# Patient Record
Sex: FEMALE | Race: WHITE | NOT HISPANIC OR LATINO | Employment: OTHER | URBAN - METROPOLITAN AREA
[De-identification: names, ages, dates, MRNs, and addresses within clinical notes are randomized per-mention and may not be internally consistent; named-entity substitution may affect disease eponyms.]

---

## 2017-02-09 ENCOUNTER — OFFICE VISIT (OUTPATIENT)
Dept: FAMILY MEDICINE | Facility: CLINIC | Age: 67
End: 2017-02-09
Payer: COMMERCIAL

## 2017-02-09 VITALS
HEIGHT: 63 IN | DIASTOLIC BLOOD PRESSURE: 80 MMHG | WEIGHT: 151 LBS | TEMPERATURE: 98.5 F | BODY MASS INDEX: 26.75 KG/M2 | HEART RATE: 80 BPM | RESPIRATION RATE: 16 BRPM | SYSTOLIC BLOOD PRESSURE: 142 MMHG

## 2017-02-09 DIAGNOSIS — R09.82 PND (POST-NASAL DRIP): ICD-10-CM

## 2017-02-09 DIAGNOSIS — J06.9 UPPER RESPIRATORY TRACT INFECTION, UNSPECIFIED TYPE: Primary | ICD-10-CM

## 2017-02-09 DIAGNOSIS — K21.9 GASTROESOPHAGEAL REFLUX DISEASE WITHOUT ESOPHAGITIS: ICD-10-CM

## 2017-02-09 PROBLEM — I10 BENIGN ESSENTIAL HYPERTENSION: Status: ACTIVE | Noted: 2017-02-09

## 2017-02-09 PROBLEM — I47.10 SUPRAVENTRICULAR TACHYCARDIA (H): Status: ACTIVE | Noted: 2017-02-09

## 2017-02-09 PROCEDURE — 99204 OFFICE O/P NEW MOD 45 MIN: CPT | Performed by: FAMILY MEDICINE

## 2017-02-09 RX ORDER — IRBESARTAN 150 MG/1
150 TABLET ORAL DAILY
Qty: 90 TABLET | Refills: 3 | COMMUNITY
Start: 2017-02-09 | End: 2017-12-07

## 2017-02-09 RX ORDER — FLUTICASONE PROPIONATE 50 MCG
2 SPRAY, SUSPENSION (ML) NASAL DAILY
Qty: 1 BOTTLE | Refills: 0 | Status: SHIPPED | OUTPATIENT
Start: 2017-02-09 | End: 2017-12-07

## 2017-02-09 RX ORDER — DILTIAZEM HYDROCHLORIDE 240 MG/1
240 CAPSULE, COATED, EXTENDED RELEASE ORAL DAILY
Qty: 90 CAPSULE | Refills: 1 | COMMUNITY
Start: 2017-02-09 | End: 2017-12-07

## 2017-02-09 NOTE — MR AVS SNAPSHOT
After Visit Summary   2/9/2017    Shavonne Lundberg    MRN: [id number]           Patient Information     Date Of Birth          1950        Visit Information        Provider Department      2/9/2017 11:30 AM Shama Temple MD [facility]        Today's Diagnoses     Upper respiratory tract infection, unspecified type    -  1     PND (post-nasal drip)           Care Instructions      Recommend Mucinex DM 1 tablet twice a day - take with a full glass of water  Flonase daily  Follow up as needed    Viral Upper Respiratory Illness (Adult)  You have a viral upper respiratory illness (URI), which is another term for the common cold. This illness is contagious during the first few days. It is spread through the air by coughing and sneezing. It may also be spread by direct contact (touching the sick person and then touching your own eyes, nose, or mouth). Frequent handwashing will decrease risk of spread. Most viral illnesses go away within 7 to 10 days with rest and simple home remedies. Sometimes the illness may last for several weeks. Antibiotics will not kill a virus, and they are generally not prescribed for this condition.    Home care    If symptoms are severe, rest at home for the first 2 to 3 days. When you resume activity, don't let yourself get too tired.    Avoid being exposed to cigarette smoke (yours or others ).    You may use acetaminophen or ibuprofen to control pain and fever, unless another medicine was prescribed. (Note: If you have chronic liver or kidney disease, have ever had a stomach ulcer or gastrointestinal bleeding, or are taking blood-thinning medicines, talk with your healthcare provider before using these medicines.) Aspirin should never be given to anyone under 18 years of age who is ill with a viral infection or fever. It may cause severe liver or brain damage.    Your appetite may be poor, so a light diet is fine. Avoid dehydration by  drinking 6 to 8 glasses of fluids per day (water, soft drinks, juices, tea, or soup). Extra fluids will help loosen secretions in the nose and lungs.    Over-the-counter cold medicines will not shorten the length of time you re sick, but they may be helpful for the following symptoms: cough, sore throat, and nasal and sinus congestion. (Note: Do not use decongestants if you have high blood pressure.)  Follow-up care  Follow up with your healthcare provider, or as advised.  When to seek medical advice  Call your healthcare provider right away if any of these occur:    Cough with lots of colored sputum (mucus)    Severe headache; face, neck, or ear pain    Difficulty swallowing due to throat pain    Fever of 100.4 F (38 C)  Call 911, or get immediate medical care  Call emergency services right away if any of these occur:    Chest pain, shortness of breath, wheezing, or difficulty breathing    Coughing up blood    Inability to swallow due to throat pain    4361-6439 [employer]. [address] [place] [address]. All rights reserved. This information is not intended as a substitute for professional medical care. Always follow your healthcare professional's instructions.              Follow-ups after your visit        Who to contact     If you have questions or need follow up information about today's clinic visit or your schedule please contact [facility] directly at [phone].  Normal or non-critical lab and imaging results will be communicated to you by MyChart, letter or phone within 4 business days after the clinic has received the results. If you do not hear from us within 7 days, please contact the clinic through MyChart or phone. If you have a critical or abnormal lab result, we will notify you by phone as soon as possible.  Submit refill requests through [employer] or call your pharmacy and they will forward the refill request to us. Please allow 3 business days for your  "refill to be completed.          Additional Information About Your Visit        [employer] Mobile Information     [employer] lets you send messages to your doctor, view your test results, renew your prescriptions, schedule appointments and more. To sign up, go to [email or web address] . Click on \"Log in\" on the left side of the screen, which will take you to the Welcome page. Then click on \"Sign up Now\" on the right side of the page.     You will be asked to enter the access code listed below, as well as some personal information. Please follow the directions to create your username and password.     Your access code is: [email or web address]  Expires: 5/10/2017 12:03 PM     Your access code will  in 90 days. If you need help or a new code, please call your [place] clinic or [phone].        Care EveryWhere ID     This is your Care EveryWhere ID. This could be used by other organizations to access your [place] medical records  [id number]        Your Vitals Were     Pulse Temperature Respirations Height BMI (Body Mass Index) Breastfeeding?    80 98.5  F (36.9  C) (Oral) 16 5' 3\" (1.6 m) 26.76 kg/m2 No       Blood Pressure from Last 3 Encounters:   17 142/80    Weight from Last 3 Encounters:   17 151 lb (68.493 kg)              Today, you had the following     No orders found for display         Today's Medication Changes          These changes are accurate as of: 17 12:03 PM.  If you have any questions, ask your nurse or doctor.               Start taking these medicines.        Dose/Directions    fluticasone 50 MCG/ACT spray   Commonly known as:  FLONASE   Used for:  PND (post-nasal drip)   Started by:  Shama Temple MD        Dose:  2 spray   Spray 2 sprays into both nostrils daily   Quantity:  1 Bottle   Refills:  0            Where to get your medicines      [facility] [address] [place] [address] [address] AT [place] [address]  " [address] [facility] [address]    Hours:  24-hours Phone:  [phone]    - fluticasone 50 MCG/ACT spray             Primary Care Provider Office Phone # Fax #    Shama Temple [phone]       [facility] [address] [facility] [address]        Thank you!     Thank you for choosing [facility]  for your care. Our goal is always to provide you with excellent care. Hearing back from our patients is one way we can continue to improve our services. Please take a few minutes to complete the written survey that you may receive in the mail after your visit with us. Thank you!             Your Updated Medication List - Protect others around you: Learn how to safely use, store and throw away your medicines at www.disposemymeds.org.          This list is accurate as of: 2/9/17 12:03 PM.  Always use your most recent med list.                   Brand Name Dispense Instructions for use    CARDIZEM  MG 24 hr capsule   Generic drug:  diltiazem     90 capsule    Take 1 capsule (240 mg) by mouth daily       fluticasone 50 MCG/ACT spray    FLONASE    1 Bottle    Spray 2 sprays into both nostrils daily       irbesartan 150 MG tablet    AVAPRO    90 tablet    Take 1 tablet (150 mg) by mouth daily

## 2017-02-09 NOTE — PATIENT INSTRUCTIONS
Recommend Mucinex DM 1 tablet twice a day - take with a full glass of water  Flonase daily  Follow up as needed    Viral Upper Respiratory Illness (Adult)  You have a viral upper respiratory illness (URI), which is another term for the common cold. This illness is contagious during the first few days. It is spread through the air by coughing and sneezing. It may also be spread by direct contact (touching the sick person and then touching your own eyes, nose, or mouth). Frequent handwashing will decrease risk of spread. Most viral illnesses go away within 7 to 10 days with rest and simple home remedies. Sometimes the illness may last for several weeks. Antibiotics will not kill a virus, and they are generally not prescribed for this condition.    Home care    If symptoms are severe, rest at home for the first 2 to 3 days. When you resume activity, don't let yourself get too tired.    Avoid being exposed to cigarette smoke (yours or others ).    You may use acetaminophen or ibuprofen to control pain and fever, unless another medicine was prescribed. (Note: If you have chronic liver or kidney disease, have ever had a stomach ulcer or gastrointestinal bleeding, or are taking blood-thinning medicines, talk with your healthcare provider before using these medicines.) Aspirin should never be given to anyone under 18 years of age who is ill with a viral infection or fever. It may cause severe liver or brain damage.    Your appetite may be poor, so a light diet is fine. Avoid dehydration by drinking 6 to 8 glasses of fluids per day (water, soft drinks, juices, tea, or soup). Extra fluids will help loosen secretions in the nose and lungs.    Over-the-counter cold medicines will not shorten the length of time you re sick, but they may be helpful for the following symptoms: cough, sore throat, and nasal and sinus congestion. (Note: Do not use decongestants if you have high blood pressure.)  Follow-up care  Follow up with your  healthcare provider, or as advised.  When to seek medical advice  Call your healthcare provider right away if any of these occur:    Cough with lots of colored sputum (mucus)    Severe headache; face, neck, or ear pain    Difficulty swallowing due to throat pain    Fever of 100.4 F (38 C)  Call 911, or get immediate medical care  Call emergency services right away if any of these occur:    Chest pain, shortness of breath, wheezing, or difficulty breathing    Coughing up blood    Inability to swallow due to throat pain    7311-5483 [employer]. [address] [place] [address]. All rights reserved. This information is not intended as a substitute for professional medical care. Always follow your healthcare professional's instructions.

## 2017-02-09 NOTE — NURSING NOTE
"Chief Complaint   Patient presents with     URI     uri symptoms x4 days, c/o cough and chest congestion, fatigue     Gastrointestinal Problem     having some nausea and @3 weeks ago was having vomiting and loose stools     Initial /80 mmHg  Pulse 80  Temp(Src) 98.5  F (36.9  C) (Oral)  Resp 16  Ht 5' 3\" (1.6 m)  Wt 151 lb (68.493 kg)  BMI 26.76 kg/m2  Breastfeeding? No Estimated body mass index is 26.76 kg/(m^2) as calculated from the following:    Height as of this encounter: 5' 3\" (1.6 m).    Weight as of this encounter: 151 lb (68.493 kg)..  BP completed using cuff size large.            My Patiño/MARYAM    "

## 2017-02-09 NOTE — PROGRESS NOTES
"  SUBJECTIVE:                                                    Shavonne Lundberg is a 66 year old female who presents to clinic today for the following health issues:      Acute Illness   Acute illness concerns: cough and chest congestion, fatigue  Onset: x4 days     Fever: unsure    Chills/Sweats: YES    Headache (location?): no     Sinus Pressure:YES    Conjunctivitis:  no    Ear Pain: no    Rhinorrhea: YES    Congestion: YES    Sore Throat: no     PND: YES     Cough: YES    Wheeze: no     Decreased Appetite: YES    Nausea: YES    Vomiting: YES- 3 wks ago     Diarrhea: had diarrhea about 3 weeks ago     Dysuria/Freq.: no     Fatigue/Achiness: YES    Sick/Strep Exposure: YES      Therapies Tried and outcome: Tylenol       Patient also reports belching, mild epigastric pain, and feelings of bloating for months.        Problem list and histories reviewed & adjusted, as indicated.  Additional history: as documented    Problem list, Medication list, Allergies, and Medical/Social/Surgical histories reviewed in EPIC and updated as appropriate.    ROS:  Constitutional, HEENT, cardiovascular, pulmonary, GI, musculoskeletal, neuro, skin, endocrine and psych systems are negative, except as otherwise noted.    OBJECTIVE:                                                    /80  Pulse 80  Temp 98.5  F (36.9  C) (Oral)  Resp 16  Ht 5' 3\" (1.6 m)  Wt 151 lb (68.5 kg)  Breastfeeding? No  BMI 26.75 kg/m2  Body mass index is 26.75 kg/(m^2).  GENERAL: healthy, alert and no distress  EYES: Eyes grossly normal to inspection, PERRL and conjunctivae and sclerae normal  HENT: normal cephalic/atraumatic, ear canals and TM's normal, nose and mouth without ulcers or lesions, oropharynx clear, oral mucous membranes moist and sinuses: not tender, congested   NECK: no adenopathy, no asymmetry, masses, or scars and thyroid normal to palpation  RESP: lungs clear to auscultation - no rales, rhonchi or wheezes  CV: regular rate and " rhythm, normal S1 S2, no S3 or S4, no murmur, click or rub, no peripheral edema and peripheral pulses strong  ABDOMEN: mild tenderness epigastric and bowel sounds normal  MS: no gross musculoskeletal defects noted, no edema  NEURO: Normal strength and tone, mentation intact and speech normal  PSYCH: mentation appears normal, affect normal/bright    Diagnostic Test Results:  none      ASSESSMENT/PLAN:                                                        1. Upper respiratory tract infection, unspecified type  - supportive care discussed. No indication for Abx at this time. Recommend mucinex OTC, honey and plenty of fluids. If symptoms persist or worsen in the next 5-7 days patient to follow up with provider.     2. PND (post-nasal drip)  - by history. Will start on flonase   - fluticasone (FLONASE) 50 MCG/ACT spray; Spray 2 sprays into both nostrils daily  Dispense: 1 Bottle; Refill: 0    3. Gastroesophageal reflux disease without esophagitis  - will start on trial of PPI to see if symptoms improve   - omeprazole (PRILOSEC) 20 MG CR capsule; Take 1 capsule (20 mg) by mouth daily  Dispense: 30 capsule; Refill: 1    See Patient Instructions    Shama Temple MD  [facility]

## 2017-04-21 ENCOUNTER — TELEPHONE (OUTPATIENT)
Dept: FAMILY MEDICINE | Facility: CLINIC | Age: 67
End: 2017-04-21

## 2017-04-21 NOTE — LETTER
[facility]  [id number] [place] [id number]  [phone]  April 21, 2017    Shavonne Lundberg  [address] [place] [address]    Dear Shavonne,    I care about your health and have reviewed your health plan. I have reviewed your medical conditions, medication list, and lab results and am making recommendations based on this review, to better manage your health.    You are in particular need of attention regarding:  -Breast Cancer Screening    I am recommending that you:  {recommendations:-schedule a MAMMOGRAM which is due. We have mammogram available at our clinic; Tuesday 8:00am-11:30am or Wednesday 2:00pm-4:15pm- to schedule call [phone].   Please disregard this reminder if you have had this exam elsewhere within the last year.  It would be helpful for us to have a copy of your mammogram report in our file so that we can best coordinate your care.    Here is a list of Health Maintenance topics that are due now or due soon:  Health Maintenance Due   Topic Date Due     ADVANCE DIRECTIVE PLANNING Q5 YRS (NO INBASKET)  06/28/1968     HEPATITIS C SCREENING  06/28/1968     MAMMO SCREEN Q2 YR (SYSTEM ASSIGNED)  06/28/1990     LIPID SCREEN Q5 YR FEMALE (SYSTEM ASSIGNED)  06/28/1995     COLON CANCER SCREEN (SYSTEM ASSIGNED)  06/28/2000     FALL RISK ASSESSMENT  06/28/2015     DEXA SCAN SCREENING (SYSTEM ASSIGNED)  06/28/2015       Please call us at [phone] (or use [employer]) to address the above recommendations.     Thank you for trusting [facility] and we appreciate the opportunity to serve you.  We look forward to supporting your healthcare needs in the future.    Healthy Regards,    Shama Temple MD/brigitte

## 2017-04-21 NOTE — TELEPHONE ENCOUNTER
Panel Management Review      Patient has the following on her problem list:     Hypertension   Last three blood pressure readings:  BP Readings from Last 3 Encounters:   02/09/17 142/80     Blood pressure: Passed    HTN Guidelines:  Age 18-59 BP range:  Less than 140/90  Age 60-85 with Diabetes:  Less than 140/90  Age 60-85 without Diabetes:  less than 150/90      Composite cancer screening  Chart review shows that this patient is due/due soon for the following Mammogram  Summary:    Patient is due/failing the following:   MAMMOGRAM    Action needed:   Patient needs referral/order: mammo    Type of outreach:    Phone, left message for patient to call back.  and Sent letter.    Questions for provider review:    None                                                                                                                                    yM Patiño/MARYAM  [place]---[facility]       Chart routed to Care Team .

## 2017-08-04 ENCOUNTER — TELEPHONE (OUTPATIENT)
Dept: FAMILY MEDICINE | Facility: CLINIC | Age: 67
End: 2017-08-04

## 2017-11-29 ENCOUNTER — TRANSFERRED RECORDS (OUTPATIENT)
Dept: HEALTH INFORMATION MANAGEMENT | Facility: CLINIC | Age: 67
End: 2017-11-29

## 2017-12-07 ENCOUNTER — OFFICE VISIT (OUTPATIENT)
Dept: FAMILY MEDICINE | Facility: CLINIC | Age: 67
End: 2017-12-07
Payer: COMMERCIAL

## 2017-12-07 VITALS
HEART RATE: 90 BPM | HEIGHT: 62 IN | SYSTOLIC BLOOD PRESSURE: 124 MMHG | RESPIRATION RATE: 16 BRPM | OXYGEN SATURATION: 98 % | DIASTOLIC BLOOD PRESSURE: 62 MMHG | BODY MASS INDEX: 28.34 KG/M2 | TEMPERATURE: 98 F | WEIGHT: 154 LBS

## 2017-12-07 DIAGNOSIS — E78.5 HYPERLIPIDEMIA WITH TARGET LDL LESS THAN 130: ICD-10-CM

## 2017-12-07 DIAGNOSIS — R73.9 BLOOD GLUCOSE ELEVATED: ICD-10-CM

## 2017-12-07 DIAGNOSIS — I10 BENIGN ESSENTIAL HYPERTENSION: ICD-10-CM

## 2017-12-07 DIAGNOSIS — Z12.31 VISIT FOR SCREENING MAMMOGRAM: ICD-10-CM

## 2017-12-07 DIAGNOSIS — R53.83 FATIGUE, UNSPECIFIED TYPE: ICD-10-CM

## 2017-12-07 DIAGNOSIS — I47.10 SUPRAVENTRICULAR TACHYCARDIA (H): ICD-10-CM

## 2017-12-07 DIAGNOSIS — G47.9 SLEEP DISTURBANCE: ICD-10-CM

## 2017-12-07 DIAGNOSIS — Z83.3 FAMILY HISTORY OF DIABETES MELLITUS: ICD-10-CM

## 2017-12-07 DIAGNOSIS — Z00.00 ROUTINE HISTORY AND PHYSICAL EXAMINATION OF ADULT: Primary | ICD-10-CM

## 2017-12-07 PROCEDURE — 99213 OFFICE O/P EST LOW 20 MIN: CPT | Mod: 25 | Performed by: INTERNAL MEDICINE

## 2017-12-07 PROCEDURE — 99397 PER PM REEVAL EST PAT 65+ YR: CPT | Performed by: INTERNAL MEDICINE

## 2017-12-07 RX ORDER — DILTIAZEM HYDROCHLORIDE 240 MG/1
240 CAPSULE, COATED, EXTENDED RELEASE ORAL DAILY
Qty: 90 CAPSULE | Refills: 1 | Status: SHIPPED | OUTPATIENT
Start: 2017-12-07 | End: 2017-12-18

## 2017-12-07 RX ORDER — DILTIAZEM HYDROCHLORIDE 240 MG/1
240 CAPSULE, COATED, EXTENDED RELEASE ORAL DAILY
Qty: 90 CAPSULE | Refills: 1 | Status: CANCELLED | OUTPATIENT
Start: 2017-12-07

## 2017-12-07 RX ORDER — IRBESARTAN 150 MG/1
150 TABLET ORAL DAILY
Qty: 90 TABLET | Refills: 3 | Status: SHIPPED | OUTPATIENT
Start: 2017-12-07 | End: 2017-12-18

## 2017-12-07 RX ORDER — CRANBERRY FRUIT EXTRACT 200 MG
600 CAPSULE ORAL DAILY
COMMUNITY
Start: 2010-12-10

## 2017-12-07 RX ORDER — IRBESARTAN 150 MG/1
150 TABLET ORAL DAILY
Qty: 90 TABLET | Refills: 3 | Status: CANCELLED | OUTPATIENT
Start: 2017-12-07

## 2017-12-07 RX ORDER — IBUPROFEN 200 MG
1-2 TABLET ORAL EVERY 6 HOURS PRN
COMMUNITY
Start: 2006-04-14 | End: 2021-11-09

## 2017-12-07 NOTE — NURSING NOTE
"Chief Complaint   Patient presents with     Physical     Establish Care     Hypertension     has been having some low blood pressures noted at night.   felt like her heart was skipping beats   90/47  Pulse 50       Initial /62  Pulse 90  Temp 98  F (36.7  C) (Oral)  Resp 16  Ht 5' 2.2\" (1.58 m)  Wt 154 lb (69.9 kg)  SpO2 98%  BMI 27.99 kg/m2 Estimated body mass index is 27.99 kg/(m^2) as calculated from the following:    Height as of this encounter: 5' 2.2\" (1.58 m).    Weight as of this encounter: 154 lb (69.9 kg).  Medication Reconciliation: complete    "

## 2017-12-07 NOTE — PROGRESS NOTES
Pt has been seen once at [facility]; desires transfer of care to [facility].    SUBJECTIVE:   Shavonne Lundberg is a 67 year old female who presents for Preventive Visit.  Are you in the first 12 months of your Medicare Part B coverage? No    Healthy Habits:  Answers for HPI/ROS submitted by the patient on 12/7/2017   Annual Exam:  Getting at least 3 servings of Calcium per day:: NO  Bi-annual eye exam:: Yes  Dental care twice a year:: Yes  Sleep apnea or symptoms of sleep apnea:: Daytime drowsiness  Frequency of exercise:: 2-3 days/week  Taking medications regularly:: Yes  Medication side effects:: None  Additional concerns today:: YES  PHQ-2 Score: 0  Duration of exercise:: Less than 15 minutes    COGNITIVE SCREEN  1) Repeat 3 items (Banana, Sunrise, Chair)    2) Clock draw: NORMAL  3) 3 item recall: Recalls 3 objects  Results: 3 items recalled: COGNITIVE IMPAIRMENT LESS LIKELY  Mini-CogTM Copyright YURIDIA Abrams. Licensed by the author for use in [facility] [employer]; reprinted with permission (heidi@[facility]). All rights reserved.          Hypertension Follow-up    Outpatient blood pressures are being checked at home. Results are 120-150/65-80.    Low Salt Diet: no added salt    Patient is currently taking diltiazem (Cardizem CD) 240 mg daily and irbesartan (Avapro) 150 mg daily  BP Readings from Last 3 Encounters:   12/07/17 124/62   02/09/17 142/80     Reviewed and updated as needed this visit by clinical staff  Tobacco  Allergies  Meds  Problems  Med Hx  Surg Hx  Fam Hx  Soc Hx        Reviewed and updated as needed this visit by Provider  Allergies  Meds  Problems        Social History   Substance Use Topics     Smoking status: Former Smoker     Smokeless tobacco: Never Used      Comment: quit in 1985     Alcohol use No     The patient does not drink >3 drinks per day nor >7 drinks per week.     Today's PHQ-2 Score:   PHQ-2 ( 1999 Pf[employer]) 12/7/2017   Q1: Little interest or  pleasure in doing things 0   Q2: Feeling down, depressed or hopeless 0   PHQ-2 Score 0   Q1: Little interest or pleasure in doing things Not at all   Q2: Feeling down, depressed or hopeless Not at all   PHQ-2 Score 0   Do you feel safe in your environment - Yes    Do you have a Health Care Directive?: No: Advance care planning was reviewed with patient; patient declined at this time.    Current providers sharing in care for this patient include: Patient Care Team:  Juanis Davison MD as PCP - General (Internal Medicine)      Hearing impairment: No    Ability to successfully perform activities of daily living: Yes, no assistance needed   Fall risk: Fallen 2 or more times in the past year?: No  Any fall with injury in the past year?: No    Home safety: none identified    The following health maintenance items are reviewed in Epic and correct as of today:  Health Maintenance   Topic Date Due     HEPATITIS C SCREENING  06/28/1968     LIPID SCREEN Q5 YR FEMALE (SYSTEM ASSIGNED)  06/28/1995     FALL RISK ASSESSMENT  06/28/2015     DEXA SCAN SCREENING (SYSTEM ASSIGNED)  06/28/2015     MAMMO SCREEN Q2 YR (SYSTEM ASSIGNED)  12/01/2018     TETANUS IMMUNIZATION (SYSTEM ASSIGNED)  09/06/2022     ADVANCE DIRECTIVE PLANNING Q5 YRS  12/07/2022     COLON CANCER SCREEN (SYSTEM ASSIGNED)  12/01/2025     INFLUENZA VACCINE (SYSTEM ASSIGNED)  Addressed     PNEUMOCOCCAL  Completed     Mammogram Screening: Patient over age 50, mutual decision to screen reflected in health maintenance.    History of abnormal Pap smear: NO - over age 65 - see link Cervical Cytology Screening Guidelines    Labs reviewed in EPIC  BP Readings from Last 3 Encounters:   12/07/17 124/62   02/09/17 142/80    Wt Readings from Last 3 Encounters:   12/07/17 154 lb (69.9 kg)   02/09/17 151 lb (68.5 kg)        No lab results found.     ROS:  CONSTITUTIONAL: NEGATIVE for fever, chills, change in weight  INTEGUMENTARY/SKIN: POSITIVE for spot behind knee; POSITIVE  "for lump ; NEGATIVE for worrisome rashes, moles or lesions  ENT/MOUTH: NEGATIVE for ear, mouth and throat problems  RESP: NEGATIVE for significant cough or SOB  BREAST: NEGATIVE for masses, tenderness or discharge  CV: HTN - taking diltiazem (Cardizem CD) and irbesartan (Avapro); bilateral varicose veins - fitted with socks recently; NEGATIVE for chest pain, palpitations or peripheral edema  GI: Heartburn - no trouble with regular use of probiotics and apple cider vinegar; NEGATIVE for nausea, abdominal pain, or change in bowel habits  MUSCULOSKELETAL: NEGATIVE for significant arthralgias or myalgia  NEURO: NEGATIVE for weakness, dizziness or paresthesias  ENDOCRINE: NEGATIVE for temperature intolerance, skin/hair changes  HEME/ALLERGY/IMMUNE: NEGATIVE for bleeding problems  PSYCHIATRIC: NEGATIVE for changes in mood or affect    This document serves as a record of the services and decisions personally performed and made by Juanis Davison MD. It was created on her behalf by Sade Lamar, a trained medical scribe. The creation of this document is based on the provider's statements to the medical scribe.   Sade Lamar, 4:04 PM, December 7, 2017    OBJECTIVE:   /62  Pulse 90  Temp 98  F (36.7  C) (Oral)  Resp 16  Ht 5' 2.2\" (1.58 m)  Wt 154 lb (69.9 kg)  SpO2 98%  BMI 27.99 kg/m2 Estimated body mass index is 27.99 kg/(m^2) as calculated from the following:    Height as of this encounter: 5' 2.2\" (1.58 m).    Weight as of this encounter: 154 lb (69.9 kg).  EXAM:   GENERAL APPEARANCE: healthy, alert and no distress  HENT: ear canals and TM's normal, nose and mouth without ulcers or lesions, oropharynx clear and oral mucous membranes moist  NECK: no adenopathy, thyroid normal to palpation and no bruits  RESP: lungs clear to auscultation - no rales, rhonchi or wheezes  BREAST: normal without masses, tenderness or nipple discharge and no palpable axillary masses or adenopathy  CV: regular rates and rhythm, normal " S1 S2, no peripheral edema and peripheral pulses strong  ABDOMEN: soft, nontender, no hepatosplenomegaly, no masses and bowel sounds normal  MS: no musculoskeletal defects are noted, gait is age appropriate without ataxia  SKIN: non-worrisome keratosis of  lower extremity; ; no suspicious lesions or rashes  NEURO: Normal strength and tone, mentation intact, speech normal, gait normal and Romberg normal  PSYCH: mentation appears normal and affect normal/bright    ASSESSMENT / PLAN:   (Z12.31) Visit for screening mammogram (primary encounter diagnosis)  Comment: clinical breast exam completed and normal; due for mammogram screening, scheduled with truck at clinic tomorrow  Plan: MA Screening Digital Bilateral          (I47.1) Supraventricular tachycardia (H)  Comment: medications well tolerated and effective; will continue to monitor; no change in medications/dosage at this time; due for labs  Plan: diltiazem (CARDIZEM CD) 240 MG 24 hr capsule,         Albumin Random Urine Quantitative with Creat         Ratio, diltiazem (CARDIZEM CD) 240 MG 24 hr         capsule          (I10) Benign essential hypertension  Comment: BP reviewed and well-controlled on diltiazem and irbesartan; medications well tolerated and effective; will monitor; no change in medications/dosage   Plan: irbesartan (AVAPRO) 150 MG tablet,         Comprehensive metabolic panel, irbesartan         (AVAPRO) 150 MG tablet          (R53.83) Fatigue, unspecified type  Comment: will check A1C and thyroid labs   Plan: Hemoglobin A1c, Hemoglobin, TSH with free T4         reflex          (Z83.3) Family history of diabetes mellitus  Comment: no personal history of diabetes; elevated blood glucose on rare occasion; due to check A1C  Plan: Hemoglobin A1c          (E78.5) Hyperlipidemia with target LDL less than 130  Comment: pt reports a history of high cholesterol, currently taking red yeast rice extract; due for fasting labs  Plan: Lipid panel reflex to direct  "LDL Fasting          (G47.9) Sleep disturbance  Comment: goes to bed 9-10, wakes up at 1:30 AM, help with paper route; back 3-4 and try to sleep until 7  Plan: discussed trial of Melatonin 1 mg    (R73.9) Blood glucose elevated  Comment: rare occasion; used husbands glucose monitor; due to check A1C  Plan: Hemoglobin A1c          Establish care and Preventative Visit:    - Colonoscopy 2015 - normal (repeat 2025)   - Taking vitamin C due to bruising easily   - Regular activity/exercise - [employer] membership   - Labs: Kidney test, liver test, cholesterol, glucose, electrolytes    End of Life Planning:  Patient currently has an advanced directive: as noted    COUNSELING:  Reviewed preventive health counseling, as reflected in patient instructions  Special attention given to:       Regular exercise       Healthy diet/nutrition    Estimated body mass index is 27.99 kg/(m^2) as calculated from the following:    Height as of this encounter: 5' 2.2\" (1.58 m).    Weight as of this encounter: 154 lb (69.9 kg).     reports that she has quit smoking. She has never used smokeless tobacco.    Appropriate preventive services were discussed with this patient, including applicable screening as appropriate for cardiovascular disease, diabetes, osteopenia/osteoporosis, and glaucoma.  As appropriate for age/gender, discussed screening for colorectal cancer, prostate cancer, breast cancer, and cervical cancer. Checklist reviewing preventive services available has been given to the patient.    Reviewed patients plan of care and provided an AVS. The Care Plan for Shavonne meets the Care Plan requirement. This Care Plan has been established and reviewed with the Patient.    Counseling Resources:  ATP IV Guidelines  Pooled Cohorts Equation Calculator  Breast Cancer Risk Calculator  FRAX Risk Assessment  ICSI Preventive Guidelines  Dietary Guidelines for Americans, 2010  USDA's MyPlate  ASA Prophylaxis  Lung CA Screening    Juanis " Naima Davison MD  Internal Medicine  [facility] [place]  15 minutes in addition to HEALTH CARE MAINTENANCE are spent with patient, over 50% of that time spent providing counselling, discussing and reviewing medical conditions/concerns, meds and potential side effects.      The information in this document, created by a medical scribe for me, accurately reflects the services I personally performed and the decisions made by me. I have reviewed and approved this document for accuracy.  Dr. Juanis Davison, December 7, 2017

## 2017-12-07 NOTE — MR AVS SNAPSHOT
After Visit Summary   12/7/2017    Shavonne Lundberg    MRN: [id number]           Patient Information     Date Of Birth          1950        Visit Information        Provider Department      12/7/2017 3:30 PM Juanis Davison MD [facility]        Today's Diagnoses     Visit for screening mammogram    -  1    Supraventricular tachycardia (H)        Benign essential hypertension        Fatigue, unspecified type        Family history of diabetes mellitus        Hyperlipidemia with target LDL less than 130        Sleep disturbance        Blood glucose elevated          Care Instructions      Preventive Health Recommendations    Female Ages 65 +    Yearly exam:     See your health care provider every year in order to  o Review health changes.   o Discuss preventive care.    o Review your medicines if your doctor has prescribed any.      You no longer need a yearly Pap test unless you've had an abnormal Pap test in the past 10 years. If you have vaginal symptoms, such as bleeding or discharge, be sure to talk with your provider about a Pap test.      Every 1 to 2 years, have a mammogram.  If you are over 69, talk with your health care provider about whether or not you want to continue having screening mammograms.      Every 10 years, have a colonoscopy. Or, have a yearly FIT test (stool test). These exams will check for colon cancer.       Have a cholesterol test every 5 years, or more often if your doctor advises it.       Have a diabetes test (fasting glucose) every three years. If you are at risk for diabetes, you should have this test more often.       At age 65, have a bone density scan (DEXA) to check for osteoporosis (brittle bone disease).    Shots:    Get a flu shot each year.    Get a tetanus shot every 10 years.    Talk to your doctor about your pneumonia vaccines. There are now two you should receive - Pneumovax (PPSV 23) and Prevnar (PCV 13).    Talk to your doctor  about the shingles vaccine.    Talk to your doctor about the hepatitis B vaccine.    Nutrition:     Eat at least 5 servings of fruits and vegetables each day.      Eat whole-grain bread, whole-wheat pasta and brown rice instead of white grains and rice.      Talk to your provider about Calcium and Vitamin D.     Lifestyle    Exercise at least 150 minutes a week (30 minutes a day, 5 days a week). This will help you control your weight and prevent disease.      Limit alcohol to one drink per day.      No smoking.       Wear sunscreen to prevent skin cancer.       See your dentist twice a year for an exam and cleaning.      See your eye doctor every 1 to 2 years to screen for conditions such as glaucoma, macular degeneration and cataracts.          Follow-ups after your visit        Your next 10 appointments already scheduled     Dec 08, 2017  2:15 PM CST   (Arrive by 2:00 PM)   MA SCREENING DIGITAL BILATERAL with [facility]   [facility]    [address] [id number] [phone]           Do not use any powder, lotion or deodorant under your arms or on your breast. If you do, we will ask you to remove it before your exam.  Wear comfortable, two-piece clothing.  If you have any allergies, tell your care team.  Bring any previous mammograms from other facilities or have them mailed to the breast center.            Dec 19, 2017  7:45 AM CST   LAB with  LAB   [facility]    [address] [address] [phone]           Please do not eat 10-12 hours before your appointment if you are coming in fasting for labs on lipids, cholesterol, or glucose (sugar). This does not apply to pregnant women. Water, hot tea and black coffee (with nothing added) are okay. Do not drink other fluids, diet soda or chew gum.              Future tests that were ordered for you today     Open Future Orders         "Priority Expected Expires Ordered    Hemoglobin A1c Routine  2018    Lipid panel reflex to direct LDL Fasting Routine  2018    Albumin Random Urine Quantitative with Creat Ratio Routine  2018    Hemoglobin Routine  2018    TSH with free T4 reflex Routine  2018    MA Screening Digital Bilateral Routine  2018    Comprehensive metabolic panel Routine  2018            Who to contact     If you have questions or need follow up information about today's clinic visit or your schedule please contact [facility] directly at [phone].  Normal or non-critical lab and imaging results will be communicated to you by [employer], letter or phone within 4 business days after the clinic has received the results. If you do not hear from us within 7 days, please contact the clinic through [employer] or phone. If you have a critical or abnormal lab result, we will notify you by phone as soon as possible.  Submit refill requests through [employer] or call your pharmacy and they will forward the refill request to us. Please allow 3 business days for your refill to be completed.          Additional Information About Your Visit        [employer] Information     [employer] lets you send messages to your doctor, view your test results, renew your prescriptions, schedule appointments and more. To sign up, go to [email or web address] . Click on \"Log in\" on the left side of the screen, which will take you to the Welcome page. Then click on \"Sign up Now\" on the right side of the page.     You will be asked to enter the access code listed below, as well as some personal information. Please follow the directions to create your username and password.     Your access code is: [email or web address]  Expires: 3/7/2018  4:31 PM     Your access code will  in 90 days. If you need help or a new code, please call your [facility] clinic or [phone].      " "  Care EveryWhere ID     This is your Care EveryWhere ID. This could be used by other organizations to access your [place] medical records  [id number]        Your Vitals Were     Pulse Temperature Respirations Height Pulse Oximetry BMI (Body Mass Index)    90 98  F (36.7  C) (Oral) 16 5' 2.2\" (1.58 m) 98% 27.99 kg/m2       Blood Pressure from Last 3 Encounters:   12/07/17 124/62   02/09/17 142/80    Weight from Last 3 Encounters:   12/07/17 154 lb (69.9 kg)   02/09/17 151 lb (68.5 kg)                 Where to get your medicines      These medications were sent to [facility] [address] [place] [address] [address] AT [place] [address]  [address] [facility] [address]    Hours:  24-hours Phone:  [phone]     diltiazem 240 MG 24 hr capsule    irbesartan 150 MG tablet          Primary Care Provider Office Phone # Fax #    Juanis Naima Davison [phone] [address] [facility] [address]        Equal Access to Services     [facility]: Hadii aad ku hadasho Soomaali, waaxda luqadaha, qaybta kaalmada adeegyada, waxay idiin hayaan adeeg kharash la'aan ah. So [facility] [phone].    ATENCIÓN: Si habla español, tiene a blancas disposición servicios gratuitos de asistencia lingüística. Llame al [phone].    We comply with applicable federal civil rights laws and [place] laws. We do not discriminate on the basis of race, color, national origin, age, disability, sex, sexual orientation, or gender identity.            Thank you!     Thank you for choosing [facility]  for your care. Our goal is always to provide you with excellent care. Hearing back from our patients is one way we can continue to improve our services. Please take a few minutes to complete the written survey that you may receive in the mail after your visit with us. Thank you!             Your Updated Medication List - Protect others around you: Learn how to safely use, store " and throw away your medicines at www.disposemymeds.org.          This list is accurate as of: 12/7/17  4:31 PM.  Always use your most recent med list.                   Brand Name Dispense Instructions for use Diagnosis    ADVIL 200 MG tablet   Generic drug:  ibuprofen      Take 1-2 tablets by mouth as needed        diltiazem 240 MG 24 hr capsule    CARDIZEM CD    90 capsule    Take 1 capsule (240 mg) by mouth daily    Supraventricular tachycardia (H)       FISH OIL + D3 9646-9929 MG-UNIT Caps      Take 2 capsules by mouth daily        irbesartan 150 MG tablet    AVAPRO    90 tablet    Take 1 tablet (150 mg) by mouth daily    Benign essential hypertension       Red Yeast Rice Extract 600 MG Caps      Take 600 mg by mouth daily        VITAMIN C PO      Take 1,635 mg by mouth daily

## 2017-12-08 ENCOUNTER — RADIANT APPOINTMENT (OUTPATIENT)
Dept: MAMMOGRAPHY | Facility: CLINIC | Age: 67
End: 2017-12-08
Payer: COMMERCIAL

## 2017-12-08 DIAGNOSIS — Z12.31 VISIT FOR SCREENING MAMMOGRAM: ICD-10-CM

## 2017-12-08 PROCEDURE — G0202 SCR MAMMO BI INCL CAD: HCPCS | Mod: TC

## 2017-12-18 DIAGNOSIS — I47.10 SUPRAVENTRICULAR TACHYCARDIA (H): ICD-10-CM

## 2017-12-18 DIAGNOSIS — I10 BENIGN ESSENTIAL HYPERTENSION: ICD-10-CM

## 2017-12-18 RX ORDER — DILTIAZEM HYDROCHLORIDE 240 MG/1
240 CAPSULE, COATED, EXTENDED RELEASE ORAL DAILY
Qty: 90 CAPSULE | Refills: 3 | Status: SHIPPED | OUTPATIENT
Start: 2017-12-18 | End: 2018-12-27

## 2017-12-18 RX ORDER — IRBESARTAN 150 MG/1
150 TABLET ORAL DAILY
Qty: 90 TABLET | Refills: 3 | Status: SHIPPED | OUTPATIENT
Start: 2017-12-18 | End: 2018-12-27

## 2017-12-18 NOTE — TELEPHONE ENCOUNTER
Medications were sent to [facility] per pt request and then she changed her mind and needed them to go to her mail order pharmacy.     I called and cancelled the local prescriptions.

## 2017-12-20 ENCOUNTER — DOCUMENTATION ONLY (OUTPATIENT)
Dept: LAB | Facility: CLINIC | Age: 67
End: 2017-12-20

## 2017-12-20 PROBLEM — G47.9 SLEEP DISTURBANCE: Status: ACTIVE | Noted: 2017-12-20

## 2017-12-20 PROBLEM — R73.9 BLOOD GLUCOSE ELEVATED: Status: ACTIVE | Noted: 2017-12-20

## 2017-12-20 PROBLEM — E78.5 HYPERLIPIDEMIA WITH TARGET LDL LESS THAN 130: Status: ACTIVE | Noted: 2017-12-20

## 2017-12-27 DIAGNOSIS — E78.5 HYPERLIPIDEMIA WITH TARGET LDL LESS THAN 130: ICD-10-CM

## 2017-12-27 DIAGNOSIS — Z83.3 FAMILY HISTORY OF DIABETES MELLITUS: ICD-10-CM

## 2017-12-27 DIAGNOSIS — I10 BENIGN ESSENTIAL HYPERTENSION: ICD-10-CM

## 2017-12-27 DIAGNOSIS — R53.83 FATIGUE, UNSPECIFIED TYPE: ICD-10-CM

## 2017-12-27 DIAGNOSIS — R73.9 BLOOD GLUCOSE ELEVATED: ICD-10-CM

## 2017-12-27 DIAGNOSIS — I47.10 SUPRAVENTRICULAR TACHYCARDIA (H): ICD-10-CM

## 2017-12-27 LAB
ALBUMIN SERPL-MCNC: 3.3 G/DL (ref 3.4–5)
ALP SERPL-CCNC: 75 U/L (ref 40–150)
ALT SERPL W P-5'-P-CCNC: 32 U/L (ref 0–50)
ANION GAP SERPL CALCULATED.3IONS-SCNC: 5 MMOL/L (ref 3–14)
AST SERPL W P-5'-P-CCNC: 19 U/L (ref 0–45)
BILIRUB SERPL-MCNC: 0.5 MG/DL (ref 0.2–1.3)
BUN SERPL-MCNC: 8 MG/DL (ref 7–30)
CALCIUM SERPL-MCNC: 8.5 MG/DL (ref 8.5–10.1)
CHLORIDE SERPL-SCNC: 105 MMOL/L (ref 94–109)
CHOLEST SERPL-MCNC: 137 MG/DL
CO2 SERPL-SCNC: 32 MMOL/L (ref 20–32)
CREAT SERPL-MCNC: 0.47 MG/DL (ref 0.52–1.04)
CREAT UR-MCNC: 33 MG/DL
GFR SERPL CREATININE-BSD FRML MDRD: >90 ML/MIN/1.7M2
GLUCOSE SERPL-MCNC: 87 MG/DL (ref 70–99)
HBA1C MFR BLD: 5.6 % (ref 4.3–6)
HDLC SERPL-MCNC: 32 MG/DL
HGB BLD-MCNC: 13.5 G/DL (ref 11.7–15.7)
LDLC SERPL CALC-MCNC: 90 MG/DL
MICROALBUMIN UR-MCNC: <5 MG/L
MICROALBUMIN/CREAT UR: NORMAL MG/G CR (ref 0–25)
NONHDLC SERPL-MCNC: 105 MG/DL
POTASSIUM SERPL-SCNC: 4.2 MMOL/L (ref 3.4–5.3)
PROT SERPL-MCNC: 6.8 G/DL (ref 6.8–8.8)
SODIUM SERPL-SCNC: 142 MMOL/L (ref 133–144)
TRIGL SERPL-MCNC: 75 MG/DL
TSH SERPL DL<=0.005 MIU/L-ACNC: 1.85 MU/L (ref 0.4–4)

## 2017-12-27 PROCEDURE — 84443 ASSAY THYROID STIM HORMONE: CPT | Performed by: INTERNAL MEDICINE

## 2017-12-27 PROCEDURE — 83036 HEMOGLOBIN GLYCOSYLATED A1C: CPT | Performed by: INTERNAL MEDICINE

## 2017-12-27 PROCEDURE — 80053 COMPREHEN METABOLIC PANEL: CPT | Performed by: INTERNAL MEDICINE

## 2017-12-27 PROCEDURE — 80061 LIPID PANEL: CPT | Performed by: INTERNAL MEDICINE

## 2017-12-27 PROCEDURE — 36415 COLL VENOUS BLD VENIPUNCTURE: CPT | Performed by: INTERNAL MEDICINE

## 2017-12-27 PROCEDURE — 82043 UR ALBUMIN QUANTITATIVE: CPT | Performed by: INTERNAL MEDICINE

## 2017-12-27 PROCEDURE — 85018 HEMOGLOBIN: CPT | Performed by: INTERNAL MEDICINE

## 2018-02-14 ENCOUNTER — TRANSFERRED RECORDS (OUTPATIENT)
Dept: HEALTH INFORMATION MANAGEMENT | Facility: CLINIC | Age: 68
End: 2018-02-14

## 2018-04-02 ENCOUNTER — TRANSFERRED RECORDS (OUTPATIENT)
Dept: HEALTH INFORMATION MANAGEMENT | Facility: CLINIC | Age: 68
End: 2018-04-02

## 2018-04-23 ENCOUNTER — TRANSFERRED RECORDS (OUTPATIENT)
Dept: HEALTH INFORMATION MANAGEMENT | Facility: CLINIC | Age: 68
End: 2018-04-23

## 2018-04-24 ENCOUNTER — TRANSFERRED RECORDS (OUTPATIENT)
Dept: HEALTH INFORMATION MANAGEMENT | Facility: CLINIC | Age: 68
End: 2018-04-24

## 2018-05-16 ENCOUNTER — TRANSFERRED RECORDS (OUTPATIENT)
Dept: HEALTH INFORMATION MANAGEMENT | Facility: CLINIC | Age: 68
End: 2018-05-16

## 2018-05-31 ENCOUNTER — TRANSFERRED RECORDS (OUTPATIENT)
Dept: HEALTH INFORMATION MANAGEMENT | Facility: CLINIC | Age: 68
End: 2018-05-31

## 2018-06-15 ENCOUNTER — TRANSFERRED RECORDS (OUTPATIENT)
Dept: HEALTH INFORMATION MANAGEMENT | Facility: CLINIC | Age: 68
End: 2018-06-15

## 2018-10-23 ENCOUNTER — TRANSFERRED RECORDS (OUTPATIENT)
Dept: HEALTH INFORMATION MANAGEMENT | Facility: CLINIC | Age: 68
End: 2018-10-23

## 2018-10-31 ENCOUNTER — TRANSFERRED RECORDS (OUTPATIENT)
Dept: HEALTH INFORMATION MANAGEMENT | Facility: CLINIC | Age: 68
End: 2018-10-31

## 2018-12-03 ENCOUNTER — TRANSFERRED RECORDS (OUTPATIENT)
Dept: HEALTH INFORMATION MANAGEMENT | Facility: CLINIC | Age: 68
End: 2018-12-03

## 2018-12-10 ENCOUNTER — ANCILLARY PROCEDURE (OUTPATIENT)
Dept: MAMMOGRAPHY | Facility: CLINIC | Age: 68
End: 2018-12-10
Attending: INTERNAL MEDICINE
Payer: COMMERCIAL

## 2018-12-10 DIAGNOSIS — Z12.31 VISIT FOR SCREENING MAMMOGRAM: ICD-10-CM

## 2018-12-10 PROCEDURE — 77067 SCR MAMMO BI INCL CAD: CPT | Mod: TC

## 2018-12-17 ENCOUNTER — TRANSFERRED RECORDS (OUTPATIENT)
Dept: HEALTH INFORMATION MANAGEMENT | Facility: CLINIC | Age: 68
End: 2018-12-17

## 2018-12-26 ASSESSMENT — ENCOUNTER SYMPTOMS
FEVER: 0
WEAKNESS: 0
CONSTIPATION: 0
BREAST MASS: 0
SHORTNESS OF BREATH: 0
ARTHRALGIAS: 0
JOINT SWELLING: 0
CHILLS: 0
EYE PAIN: 0
ABDOMINAL PAIN: 0
HEMATOCHEZIA: 0
DIARRHEA: 0
NAUSEA: 0
DIZZINESS: 0
PARESTHESIAS: 0
DYSURIA: 0
SORE THROAT: 0
MYALGIAS: 0
HEARTBURN: 0
NERVOUS/ANXIOUS: 0
HEADACHES: 0
COUGH: 1
FREQUENCY: 0
HEMATURIA: 0

## 2018-12-26 ASSESSMENT — ACTIVITIES OF DAILY LIVING (ADL): CURRENT_FUNCTION: NO ASSISTANCE NEEDED

## 2018-12-27 ENCOUNTER — TRANSFERRED RECORDS (OUTPATIENT)
Dept: HEALTH INFORMATION MANAGEMENT | Facility: CLINIC | Age: 68
End: 2018-12-27

## 2018-12-27 ENCOUNTER — OFFICE VISIT (OUTPATIENT)
Dept: FAMILY MEDICINE | Facility: CLINIC | Age: 68
End: 2018-12-27
Payer: COMMERCIAL

## 2018-12-27 VITALS
SYSTOLIC BLOOD PRESSURE: 122 MMHG | OXYGEN SATURATION: 96 % | WEIGHT: 151.1 LBS | BODY MASS INDEX: 26.77 KG/M2 | RESPIRATION RATE: 16 BRPM | TEMPERATURE: 99 F | DIASTOLIC BLOOD PRESSURE: 62 MMHG | HEART RATE: 75 BPM | HEIGHT: 63 IN

## 2018-12-27 DIAGNOSIS — I47.10 SUPRAVENTRICULAR TACHYCARDIA (H): ICD-10-CM

## 2018-12-27 DIAGNOSIS — R07.89 OTHER CHEST PAIN: ICD-10-CM

## 2018-12-27 DIAGNOSIS — E78.5 HYPERLIPIDEMIA WITH TARGET LDL LESS THAN 130: ICD-10-CM

## 2018-12-27 DIAGNOSIS — R73.9 BLOOD GLUCOSE ELEVATED: ICD-10-CM

## 2018-12-27 DIAGNOSIS — Z00.00 WELLNESS EXAMINATION: Primary | ICD-10-CM

## 2018-12-27 DIAGNOSIS — I10 BENIGN ESSENTIAL HYPERTENSION: ICD-10-CM

## 2018-12-27 DIAGNOSIS — Z83.3 FAMILY HISTORY OF DIABETES MELLITUS: ICD-10-CM

## 2018-12-27 LAB — HBA1C MFR BLD: 5.5 % (ref 0–5.6)

## 2018-12-27 PROCEDURE — 36415 COLL VENOUS BLD VENIPUNCTURE: CPT | Performed by: INTERNAL MEDICINE

## 2018-12-27 PROCEDURE — 83036 HEMOGLOBIN GLYCOSYLATED A1C: CPT | Performed by: INTERNAL MEDICINE

## 2018-12-27 PROCEDURE — 99397 PER PM REEVAL EST PAT 65+ YR: CPT | Performed by: INTERNAL MEDICINE

## 2018-12-27 PROCEDURE — 82043 UR ALBUMIN QUANTITATIVE: CPT | Performed by: INTERNAL MEDICINE

## 2018-12-27 PROCEDURE — 99213 OFFICE O/P EST LOW 20 MIN: CPT | Mod: 25 | Performed by: INTERNAL MEDICINE

## 2018-12-27 PROCEDURE — 80061 LIPID PANEL: CPT | Performed by: INTERNAL MEDICINE

## 2018-12-27 PROCEDURE — 80053 COMPREHEN METABOLIC PANEL: CPT | Performed by: INTERNAL MEDICINE

## 2018-12-27 RX ORDER — NITROGLYCERIN 0.4 MG/1
1 TABLET SUBLINGUAL PRN
COMMUNITY
Start: 2015-11-04 | End: 2020-01-06

## 2018-12-27 RX ORDER — DILTIAZEM HYDROCHLORIDE 240 MG/1
240 CAPSULE, COATED, EXTENDED RELEASE ORAL DAILY
Qty: 90 CAPSULE | Refills: 3 | Status: SHIPPED | OUTPATIENT
Start: 2018-12-27 | End: 2020-01-06

## 2018-12-27 RX ORDER — NAPROXEN SODIUM 220 MG
220 TABLET ORAL DAILY PRN
COMMUNITY
End: 2020-01-06

## 2018-12-27 RX ORDER — UBIDECARENONE 100 MG
100 CAPSULE ORAL DAILY
COMMUNITY
Start: 2010-12-10

## 2018-12-27 RX ORDER — IRBESARTAN 150 MG/1
150 TABLET ORAL DAILY
Qty: 90 TABLET | Refills: 3 | Status: SHIPPED | OUTPATIENT
Start: 2018-12-27 | End: 2020-01-06

## 2018-12-27 RX ORDER — NITROGLYCERIN 0.4 MG/1
TABLET SUBLINGUAL
Qty: 30 TABLET | Refills: 1 | Status: SHIPPED | OUTPATIENT
Start: 2018-12-27 | End: 2023-03-02

## 2018-12-27 ASSESSMENT — ENCOUNTER SYMPTOMS
EYE PAIN: 0
CONSTIPATION: 0
CHILLS: 0
NERVOUS/ANXIOUS: 0
FEVER: 0
DIZZINESS: 0
SHORTNESS OF BREATH: 0
HEARTBURN: 0
HEMATOCHEZIA: 0
JOINT SWELLING: 0
DIARRHEA: 0
ARTHRALGIAS: 0
ABDOMINAL PAIN: 0
BREAST MASS: 0
DYSURIA: 0
MYALGIAS: 0
FREQUENCY: 0
COUGH: 1
HEMATURIA: 0
WEAKNESS: 0
SORE THROAT: 0
PARESTHESIAS: 0
NAUSEA: 0
HEADACHES: 0

## 2018-12-27 ASSESSMENT — ACTIVITIES OF DAILY LIVING (ADL): CURRENT_FUNCTION: NO ASSISTANCE NEEDED

## 2018-12-27 ASSESSMENT — MIFFLIN-ST. JEOR: SCORE: 1176.58

## 2018-12-27 NOTE — PROGRESS NOTES
"SUBJECTIVE:   Shavonne Lundberg is a 68 year old female who presents for Preventive Visit.      Are you in the first 12 months of your Medicare coverage?  No    Annual Wellness Visit     In general, how would you rate your overall health?  Good    Frequency of exercise:  1 day/week    Duration of exercise:  30-45 minutes    Do you usually eat at least 4 servings of fruit and vegetables a day, include whole grains    & fiber and avoid regularly eating high fat or \"junk\" foods?  Yes    Taking medications regularly:  Yes    Medication side effects:  None    Ability to successfully perform activities of daily living:  No assistance needed    Home Safety:  No safety concerns identified    Hearing Impairment:  No hearing concerns    In the past 6 months, have you been bothered by leaking of urine?  No    In general, how would you rate your overall mental or emotional health?  Good    PHQ-2 Total Score: 1    Additional concerns today:  Yes    Do you feel safe in your environment? Yes    Do you have a Health Care Directive? No: Advance care planning was reviewed with patient; patient declined at this time.      Fall risk  Fallen 2 or more times in the past year?: No  Any fall with injury in the past year?: No    Cognitive Screening   1) Repeat 3 items (Leader, Season, Table)    2) Clock draw: NORMAL  3) 3 item recall: Recalls 3 objects  Results: 3 items recalled: COGNITIVE IMPAIRMENT LESS LIKELY    Mini-CogTM Copyright YURIDIA Abrams. Licensed by the author for use in [facility]; reprinted with permission (heidi@.[facility]). All rights reserved.      Do you have sleep apnea, excessive snoring or daytime drowsiness?: no    Reviewed and updated as needed this visit by clinical staff  Tobacco  Allergies  Meds  Problems  Med Hx  Surg Hx  Fam Hx  Soc Hx          Reviewed and updated as needed this visit by Provider  Tobacco  Allergies  Meds  Problems  Med Hx  Surg Hx  Fam Hx        Social History     Tobacco " Use     Smoking status: Former Smoker     Smokeless tobacco: Never Used     Tobacco comment: quit in 1985   Substance Use Topics     Alcohol use: No     Alcohol/week: 0.0 oz       Alcohol Use 12/26/2018   If you drink alcohol do you typically have greater than 3 drinks per day OR greater than 7 drinks per week? Not Applicable     In addition to wellness, she is here to follow up on HTN, sleep and    Hypertension Follow-up      Outpatient blood pressures are being checked at home.  Results are around 120/70.    Low Salt Diet: no added salt      Current providers sharing in care for this patient include:   Patient Care Team:  Juanis Davison MD as PCP - General (Internal Medicine)  Juanis Davison MD as PCP - Assigned PCP    The following health maintenance items are reviewed in Epic and correct as of today:  Health Maintenance   Topic Date Due     HEPATITIS C SCREENING  06/28/1968     ZOSTER IMMUNIZATION (1 of 2) 06/28/2000     DEXA SCAN SCREENING (SYSTEM ASSIGNED)  06/28/2015     FALL RISK ASSESSMENT  12/07/2018     PHQ-2 Q1 YR  12/27/2019     MAMMO SCREEN Q2 YR (SYSTEM ASSIGNED)  12/10/2020     DTAP/TDAP/TD IMMUNIZATION (2 - Td) 09/06/2022     ADVANCE DIRECTIVE PLANNING Q5 YRS  12/07/2022     LIPID SCREEN Q5 YR FEMALE (SYSTEM ASSIGNED)  12/27/2022     COLON CANCER SCREEN (SYSTEM ASSIGNED)  12/01/2025     PNEUMOVAX IMMUNIZATION 65+ LOW/MEDIUM RISK  Completed     INFLUENZA VACCINE  Addressed     IPV IMMUNIZATION  Aged Out     MENINGITIS IMMUNIZATION  Aged Out       Mammogram Screening: Mammogram Screening: Patient over age 50, mutual decision to screen reflected in health maintenance.    Review of Systems   Constitutional: Negative for chills and fever.   HENT: Negative for ear pain, hearing loss and sore throat.    Eyes: Negative for pain and visual disturbance.   Respiratory: Positive for cough. Negative for shortness of breath.    Cardiovascular: Negative for chest pain and peripheral edema.  "  Gastrointestinal: Negative for abdominal pain, constipation, diarrhea, heartburn, hematochezia and nausea.   Breasts:  Negative for tenderness, breast mass and discharge.   Genitourinary: Negative for dysuria, frequency, genital sores, hematuria, pelvic pain, urgency, vaginal bleeding and vaginal discharge.   Musculoskeletal: Negative for arthralgias, joint swelling and myalgias.   Skin: Negative for rash.   Neurological: Negative for dizziness, weakness, headaches and paresthesias.   Psychiatric/Behavioral: Negative for mood changes. The patient is not nervous/anxious.      URI- using OTC Coricidin HBP- finding it helpful    OBJECTIVE:   /62   Pulse 75   Temp 99  F (37.2  C) (Oral)   Resp 16   Ht 1.588 m (5' 2.5\")   Wt 68.5 kg (151 lb 1.6 oz)   SpO2 96%   BMI 27.20 kg/m   Estimated body mass index is 27.2 kg/m  as calculated from the following:    Height as of this encounter: 1.588 m (5' 2.5\").    Weight as of this encounter: 68.5 kg (151 lb 1.6 oz).  Physical Exam  GENERAL: healthy, alert and no distress  NECK: no adenopathy, no asymmetry, masses, or scars and thyroid normal to palpation  RESP: lungs clear to auscultation - no rales, rhonchi or wheezes  BREAST: normal without masses, tenderness or nipple discharge and no palpable axillary masses or adenopathy  CV: regular rates and rhythm, normal S1 S2, no S3 or S4, no murmur, click or rub, peripheral pulses strong and no peripheral edema  ABDOMEN: soft, nontender, no hepatosplenomegaly, no masses and bowel sounds normal  MS: no gross musculoskeletal defects noted, no edema  SKIN: no suspicious lesions or rashes  NEURO: Normal strength and tone, mentation intact and speech normal  PSYCH: mentation appears normal, affect normal/bright      ASSESSMENT / PLAN:   (Z00.00) Wellness examination  (primary encounter diagnosis)  Comment: HM reviewed; Mammo up to date; colonoscopy listed but no report; requested from [facility] in [place].   Plan: " reviewed immunizations: TDAP, PPSV23, PCV13 all up to date; discussed Shingrix series of 2    (I10) Benign essential hypertension  Comment: bp well controlled; meds well tolerated.  Plan: irbesartan (AVAPRO) 150 MG tablet,         Comprehensive metabolic panel, Albumin Random         Urine Quantitative with Creat Ratio          (I47.1) Supraventricular tachycardia (H)  Comment: takes meds at night; reports some hear racing in the AM, brief  Plan: diltiazem ER COATED BEADS (CARDIZEM CD) 240 MG         24 hr capsule          (Z83.3) Family history of diabetes mellitus  Comment: no history of GDM;   Lab Results   Component Value Date    GLC 87 12/27/2017     Plan: healthy diet, regular exercise.     (R73.9) Blood glucose elevated  Comment: past hx of elevated glucose;   Lab Results   Component Value Date    A1C 5.6 12/27/2017     Plan: Hemoglobin A1c        Healthy diet and regular exercise    (E78.5) Hyperlipidemia with target LDL less than 130  Comment:   Lab Results   Component Value Date    CHOL 137 12/27/2017     Lab Results   Component Value Date    HDL 32 12/27/2017     Lab Results   Component Value Date    LDL 90 12/27/2017     Lab Results   Component Value Date    TRIG 75 12/27/2017     No results found for: CHOLHDLRATIO  Using CoQ10 and Red Rice Yeast daily; keeping active  The 10-year ASCVD risk score (Doris DC Jr., et al., 2013) is: 9.4%    Values used to calculate the score:      Age: 68 years      Sex: Female      Is Non- : No      Diabetic: No      Tobacco smoker: No      Systolic Blood Pressure: 122 mmHg      Is BP treated: Yes      HDL Cholesterol: 32 mg/dL      Total Cholesterol: 137 mg/dL  She prefers to continue Red Rice Yeast and will reassess with labs today.   Plan: Comprehensive metabolic panel, Lipid panel         reflex to direct LDL Fasting          (R07.89) Other chest pain  Comment: pt reports evaluation at [facility] several years ago; requests NTG refill to  "\"have on Hand\" ; Rx did not have her label on bottle  Plan: nitroGLYcerin (NITROSTAT) 0.4 MG sublingual         Tablet; pt also reports she had a cardiac calcium scan         Records requested from previous clinic- [facility] to assess cardiac evaluation. Faxed JULISSA      End of Life Planning:  Patient currently has an advanced directive: Yes.  Practitioner is supportive of decision.    COUNSELING:  Reviewed preventive health counseling, as reflected in patient instructions       Regular exercise       Healthy diet/nutrition       Immunizations    Reviewed; recommended Shingrix series of 2             Colon cancer screening    BP Readings from Last 1 Encounters:   12/27/18 122/62     Estimated body mass index is 27.2 kg/m  as calculated from the following:    Height as of this encounter: 1.588 m (5' 2.5\").    Weight as of this encounter: 68.5 kg (151 lb 1.6 oz).           reports that she has quit smoking. she has never used smokeless tobacco.      Appropriate preventive services were discussed with this patient, including applicable screening as appropriate for cardiovascular disease, diabetes, osteopenia/osteoporosis, and glaucoma.  As appropriate for age/gender, discussed screening for colorectal cancer, prostate cancer, breast cancer, and cervical cancer. Checklist reviewing preventive services available has been given to the patient.    Reviewed patients plan of care and provided an AVS. The Care Plan for Shavonne meets the Care Plan requirement. This Care Plan has been established and reviewed with the Patient.    Counseling Resources:  ATP IV Guidelines  Pooled Cohorts Equation Calculator  Breast Cancer Risk Calculator  FRAX Risk Assessment  ICSI Preventive Guidelines  Dietary Guidelines for Americans, 2010  USDA's MyPlate  ASA Prophylaxis  Lung CA Screening    Juanis Davison MD  Internal Medicine   [facility] [place]  15 minutes in addition to HEALTH CARE MAINTENANCE are spent with patient, " over 50% of that time spent providing counselling, discussing and reviewing medical conditions/concerns, meds and potential side effects.

## 2018-12-27 NOTE — PATIENT INSTRUCTIONS

## 2018-12-28 LAB
ALBUMIN SERPL-MCNC: 3.6 G/DL (ref 3.4–5)
ALP SERPL-CCNC: 79 U/L (ref 40–150)
ALT SERPL W P-5'-P-CCNC: 20 U/L (ref 0–50)
ANION GAP SERPL CALCULATED.3IONS-SCNC: 7 MMOL/L (ref 3–14)
AST SERPL W P-5'-P-CCNC: 15 U/L (ref 0–45)
BILIRUB SERPL-MCNC: 0.6 MG/DL (ref 0.2–1.3)
BUN SERPL-MCNC: 10 MG/DL (ref 7–30)
CALCIUM SERPL-MCNC: 9.2 MG/DL (ref 8.5–10.1)
CHLORIDE SERPL-SCNC: 101 MMOL/L (ref 94–109)
CHOLEST SERPL-MCNC: 220 MG/DL
CO2 SERPL-SCNC: 30 MMOL/L (ref 20–32)
CREAT SERPL-MCNC: 0.53 MG/DL (ref 0.52–1.04)
CREAT UR-MCNC: 19 MG/DL
GFR SERPL CREATININE-BSD FRML MDRD: >90 ML/MIN/{1.73_M2}
GLUCOSE SERPL-MCNC: 95 MG/DL (ref 70–99)
HDLC SERPL-MCNC: 39 MG/DL
LDLC SERPL CALC-MCNC: 160 MG/DL
MICROALBUMIN UR-MCNC: <5 MG/L
MICROALBUMIN/CREAT UR: NORMAL MG/G CR (ref 0–25)
NONHDLC SERPL-MCNC: 181 MG/DL
POTASSIUM SERPL-SCNC: 4.2 MMOL/L (ref 3.4–5.3)
PROT SERPL-MCNC: 7.4 G/DL (ref 6.8–8.8)
SODIUM SERPL-SCNC: 138 MMOL/L (ref 133–144)
TRIGL SERPL-MCNC: 107 MG/DL

## 2018-12-31 ENCOUNTER — TRANSFERRED RECORDS (OUTPATIENT)
Dept: HEALTH INFORMATION MANAGEMENT | Facility: CLINIC | Age: 68
End: 2018-12-31

## 2019-10-01 ENCOUNTER — HEALTH MAINTENANCE LETTER (OUTPATIENT)
Age: 69
End: 2019-10-01

## 2020-01-03 ENCOUNTER — ANCILLARY PROCEDURE (OUTPATIENT)
Dept: MAMMOGRAPHY | Facility: CLINIC | Age: 70
End: 2020-01-03
Attending: INTERNAL MEDICINE
Payer: COMMERCIAL

## 2020-01-03 PROCEDURE — 77067 SCR MAMMO BI INCL CAD: CPT | Mod: TC

## 2020-01-06 ENCOUNTER — OFFICE VISIT (OUTPATIENT)
Dept: FAMILY MEDICINE | Facility: CLINIC | Age: 70
End: 2020-01-06
Payer: COMMERCIAL

## 2020-01-06 VITALS
WEIGHT: 155.1 LBS | HEART RATE: 75 BPM | RESPIRATION RATE: 16 BRPM | BODY MASS INDEX: 28.54 KG/M2 | DIASTOLIC BLOOD PRESSURE: 64 MMHG | HEIGHT: 62 IN | SYSTOLIC BLOOD PRESSURE: 122 MMHG | TEMPERATURE: 98.8 F | OXYGEN SATURATION: 98 %

## 2020-01-06 DIAGNOSIS — I47.10 SUPRAVENTRICULAR TACHYCARDIA (H): ICD-10-CM

## 2020-01-06 DIAGNOSIS — I10 BENIGN ESSENTIAL HYPERTENSION: ICD-10-CM

## 2020-01-06 DIAGNOSIS — Z00.00 ENCOUNTER FOR MEDICARE ANNUAL WELLNESS EXAM: Primary | ICD-10-CM

## 2020-01-06 DIAGNOSIS — E78.5 HYPERLIPIDEMIA WITH TARGET LDL LESS THAN 130: ICD-10-CM

## 2020-01-06 PROCEDURE — 80061 LIPID PANEL: CPT | Performed by: INTERNAL MEDICINE

## 2020-01-06 PROCEDURE — 36415 COLL VENOUS BLD VENIPUNCTURE: CPT | Performed by: INTERNAL MEDICINE

## 2020-01-06 PROCEDURE — 80053 COMPREHEN METABOLIC PANEL: CPT | Performed by: INTERNAL MEDICINE

## 2020-01-06 PROCEDURE — 99213 OFFICE O/P EST LOW 20 MIN: CPT | Mod: 25 | Performed by: INTERNAL MEDICINE

## 2020-01-06 PROCEDURE — 82043 UR ALBUMIN QUANTITATIVE: CPT | Performed by: INTERNAL MEDICINE

## 2020-01-06 PROCEDURE — 99397 PER PM REEVAL EST PAT 65+ YR: CPT | Performed by: INTERNAL MEDICINE

## 2020-01-06 RX ORDER — IRBESARTAN 150 MG/1
150 TABLET ORAL DAILY
Qty: 90 TABLET | Refills: 3 | Status: CANCELLED | OUTPATIENT
Start: 2020-01-06

## 2020-01-06 RX ORDER — IRBESARTAN 150 MG/1
150 TABLET ORAL DAILY
Qty: 90 TABLET | Refills: 3 | Status: SHIPPED | OUTPATIENT
Start: 2020-01-06 | End: 2020-12-22

## 2020-01-06 RX ORDER — DILTIAZEM HYDROCHLORIDE 240 MG/1
240 CAPSULE, COATED, EXTENDED RELEASE ORAL DAILY
Qty: 90 CAPSULE | Refills: 3 | Status: SHIPPED | OUTPATIENT
Start: 2020-01-06 | End: 2021-01-07

## 2020-01-06 ASSESSMENT — ENCOUNTER SYMPTOMS
HEADACHES: 0
NERVOUS/ANXIOUS: 0
SHORTNESS OF BREATH: 0
ARTHRALGIAS: 1
DIZZINESS: 0
DYSURIA: 0
CONSTIPATION: 0
COUGH: 0
EYE PAIN: 0
ABDOMINAL PAIN: 0
PARESTHESIAS: 0
CHILLS: 0
PALPITATIONS: 0
WEAKNESS: 0
MYALGIAS: 1
HEMATOCHEZIA: 0
BREAST MASS: 0
DIARRHEA: 0
JOINT SWELLING: 0
HEARTBURN: 1
SORE THROAT: 0
HEMATURIA: 0
NAUSEA: 0
FREQUENCY: 0
FEVER: 0

## 2020-01-06 ASSESSMENT — ACTIVITIES OF DAILY LIVING (ADL): CURRENT_FUNCTION: NO ASSISTANCE NEEDED

## 2020-01-06 ASSESSMENT — MIFFLIN-ST. JEOR: SCORE: 1185.75

## 2020-01-06 NOTE — PATIENT INSTRUCTIONS
Patient Education   Personalized Prevention Plan  You are due for the preventive services outlined below.  Your care team is available to assist you in scheduling these services.  If you have already completed any of these items, please share that information with your care team to update in your medical record.  Health Maintenance Due   Topic Date Due     Osteoporosis Screening  1950     Hepatitis C Screening  1950     Zoster (Shingles) Vaccine (1 of 2) 06/28/2000     FALL RISK ASSESSMENT  12/27/2019     Annual Wellness Visit  12/27/2019       Exercise for a Healthier Heart     Exercise with a friend. When activity is fun, you're more likely to stick with it.   You may wonder how you can improve the health of your heart. If you re thinking about exercise, you re on the right track. You don t need to become an athlete, but you do need a certain amount of brisk exercise to help strengthen your heart. If you have been diagnosed with a heart condition, your doctor may recommend exercise to help stabilize your condition. To help make exercise a habit, choose safe, fun activities.  Be sure to check with your healthcare provider before starting an exercise program.  Why exercise?  Exercising regularly offers many healthy rewards. It can help you do all of the following:    Improve your blood cholesterol level to help prevent further heart trouble    Lower your blood pressure to help prevent a stroke or heart attack    Control diabetes, or reduce your risk of getting this disease    Improve your heart and lung function    Reach and maintain a healthy weight    Make your muscles stronger and more limber so you can stay active    Prevent falls and fractures by slowing the loss of bone mass (osteoporosis)    Manage stress better    Reduce your blood pressure    Improve your sense of self and your body image  Exercise tips  Ease into your routine. Set small goals. Then build on them.  Exercise on most days. Aim  for a total of 150 or more minutes of moderate to  vigorous intensity activity each week. Consider 40 minutes, 3 to 4 times a week. For best results, activity should last for 40 minutes on average. It is OK to work up to the 40 minute period over time. Examples of moderate-intensity activity is walking 1 mile in 15 minutes or 30 to 45 minutes of yard work.  Step up your daily activity level. Along with your exercise program, try being more active throughout the day. Walk instead of drive. Do more household tasks or yard work.  Choose one or more activities you enjoy. Walking is one of the easiest things you can do. You can also try swimming, riding a bike, dancing, or taking an exercise class.  Stop exercising and call your doctor if you:    Have chest pain or feel dizzy or lightheaded    Feel burning, tightness, pressure, or heaviness in your chest, neck, shoulders, back, or arms    Have unusual shortness of breath    Have increased joint or muscle pain    Have palpitations or an irregular heartbeat  Date Last Reviewed: 5/1/2016 2000-2019 [employer]. [address] [place] [address]. All rights reserved. This information is not intended as a substitute for professional medical care. Always follow your healthcare professional's instructions.          Urinary Incontinence, Female (Adult)  Urinary incontinence means loss of control of the bladder. This problem affects many women, especially as they get older. If you have incontinence, you may be embarrassed to ask for help. But know that this problem can be treated.  Types of Incontinence  There are different types of incontinence. Two of the main types are described here. You can have more than one type.    Stress incontinence. With this type, urine leaks when pressure (stress) is put on the bladder. This may happen when you cough, sneeze, or laugh. Stress incontinence most often occurs because the pelvic floor muscles that support the  bladder and urethra are weak. This can happen after pregnancy and vaginal childbirth or a hysterectomy. It can also be due to excess body weight or hormone changes.    Urge incontinence (also called overactive bladder). With this type, a sudden urge to urinate is felt often. This may happen even though there may not be much urine in the bladder. The need to urinate often during the night is common. Urge incontinence most often occurs because of bladder spasms. This may be due to bladder irritation or infection. Damage to bladder nerves or pelvic muscles, constipation, and certain medicines can also lead to urge incontinence.  Treatment of urinary incontinence depends on the cause. Further evaluation is needed to find the type you have. This will likely include an exam and certain tests. Based on the results, you and your healthcare provider can then plan treatment. Until a diagnosis is made, the home care tips below can help relieve symptoms.  Home care    Do pelvic floor muscle exercises, if they are prescribed. The pelvic floor muscles help support the bladder and urethra. Many women find that their symptoms improve when doing special exercises that strengthen these muscles. To do the exercises contract the muscles you would use to stop your stream of urine, but do this when you re not urinating. Hold for 10 seconds, then relax. Repeat 10 to 20 times in a row, at least 3 times a day. Your provider may give you other instructions for how to do the exercises and how often.    Keep a bladder diary. This helps track how often and how much you urinate over a set period of time. Bring this diary with you to your next visit with the provider. The information can help your provider learn more about your bladder problem.    Lose weight, if advised to by your provider. Excess weight puts pressure on the bladder. Your provider can help you create a weight-loss plan that s right for you. This may include exercising more and  making certain diet changes.    Don't consume foods and drinks that may irritate the bladder. These can include alcohol and caffeinated drinks.    Quit smoking. Smoking and other tobacco use can lead to chronic cough that strains the pelvic floor muscles. Smoking may also damage the bladder and urethra. Talk with your provider about treatments or methods you can use to quit smoking.    If drinking large amounts of fluid causes you to have symptoms, you may be advised to limit your fluid intake. You may also be advised to drink most of your fluids during the day and to limit fluids at night.    If you re worried about urine leakage or accidents, you may wear absorbent pads to catch urine. Change the pads often. This helps reduce discomfort. It may also reduce the risk of skin or bladder infections.  Follow-up care  Follow up with your healthcare provider, or as directed. It may take some to find the right treatment for your problem. Your treatment plan may include special therapies or medicines. Certain procedures or surgery may also be options. Be sure to discuss any questions you have with your provider.  When to seek medical advice  Call the healthcare provider right away if any of these occur:    Fever of 100.4 F (38 C) or higher, or as directed by your provider    Bladder pain or fullness    Abdominal swelling    Nausea or vomiting    Back pain    Weakness, dizziness or fainting  Date Last Reviewed: 10/1/2017    3845-7039 [employer]. [address] [place] [address], [place] [id number]. All rights reserved. This information is not intended as a substitute for professional medical care. Always follow your healthcare professional's instructions.          Preventing Falls in the Home  An adult or child can fall for many reasons. If you are an older adult, you may fall because your reaction time slows down. Your muscles and joints may get stiff, weak, or less flexible because of illness, medicines, or a physical  condition. These things can also make a child more likely to fall or be injured in a fall.  Other health problems that make falls more likely include:    Arthritis    Dizziness or lightheadedness when you get out of bed (orthostatic hypotension)    History of a stroke    Dizziness    Anemia    Certain medicines taken for mental illness or to control blood pressure.    Problems with balance or gait    Bladder or urinary problems    History of falls with or without an injury    Changes in vision (vision impairment)    Changes in thinking skills and memory (cognitive impairment)  Injuries from a fall can include broken bones, dislocated joints, internal bleeding and cuts. When these injuries are serious enough, they can make it impossible for you or a child who is injured in a fall to live on his or her ownhome.  Prevention tips  To help prevent falls and fall-related injuries, follow the tips below.   Floors  Make floors safer by doing the following:     Put nonskid pads under area rugs.    Remove throw rugs.    Replace worn floor coverings.    Tack carpets firmly to each step on carpeted stairs. Put nonskid strips on the edges of uncarpeted stairs.    Keep floors and stairs free of clutter and cords.    Arrange furniture so there are clear pathways.    Clean up any spills right away.    Wear shoes that fit.  Bathrooms    Make bathrooms safer by doing the following:     Install grab bars in the tub or shower.    Apply nonskid strips or put a nonskid rubber mat in the tub or shower.    Sit on a bath chair to bathe.    Use bathmats with nonskid backing.  Lighting and the environment  Improve lighting in your home by doing the following:     Keep a flashlight in each room. Or put a lamp next to the bed within easy reach.    Put nightlights in the bedrooms, hallways, kitchen, and bathrooms.    Make sure all stairways have good lighting.    Take your time when going up and down stairs.    Put handrails on both sides of  stairs and in walkways for more support. To prevent injury to your wrist or arm, don t use handrails to pull yourself up.    Install grab bars to pull yourself up.    Move or rearrange items that you use often. This will make them easier to find or reach.    Look at your home to find any safety hazards. Especially look at doorways, walkways, and the driveway. Remove or repair any safety problems that you find.  Date Last Reviewed: 8/1/2016 2000-2019 [employer]. [address] [place] [address]. All rights reserved. This information is not intended as a substitute for professional medical care. Always follow your healthcare professional's instructions.     If risk greater than 7.5%, then statin therapy should be considered.        The 10-year ASCVD risk score (Dorispraveena KING Jr., et al., 2013) is: 11.6%    Values used to calculate the score:      Age: 69 years      Sex: Female      Is Non- : No      Diabetic: No      Tobacco smoker: No      Systolic Blood Pressure: 122 mmHg      Is BP treated: Yes      HDL Cholesterol: 39 mg/dL      Total Cholesterol: 220 mg/dL

## 2020-01-06 NOTE — PROGRESS NOTES
"Chief Complaint   Patient presents with     Physical     Hypertension       SUBJECTIVE:   Shavonne Lundberg is a 69 year old female who presents for Preventive Visit.    Discussed additional charges that may incur if addressing non physical related concerns.   Pt agreeable.  LORETTA Mcleod LPN    Are you in the first 12 months of your Medicare coverage?  No    Healthy Habits:     In general, how would you rate your overall health?  Good    Frequency of exercise:  None    Do you usually eat at least 4 servings of fruit and vegetables a day, include whole grains    & fiber and avoid regularly eating high fat or \"junk\" foods?  Yes    Taking medications regularly:  Yes    Barriers to taking medications:  None    Medication side effects:  None    Ability to successfully perform activities of daily living:  No assistance needed    Home Safety:  No safety concerns identified    Hearing Impairment:  No hearing concerns    In the past 6 months, have you been bothered by leaking of urine? Yes    In general, how would you rate your overall mental or emotional health?  Excellent      PHQ-2 Total Score: 0    Additional concerns today:  No    Do you feel safe in your environment? Yes    Have you ever done Advance Care Planning? (For example, a Health Directive, POLST, or a discussion with a medical provider or your loved ones about your wishes): No, advance care planning information given to patient to review.  Patient plans to discuss their wishes with loved ones or provider.        Fall risk  Fallen 2 or more times in the past year?: No  Any fall with injury in the past year?: Yes  Timed Up and Go Test (>13.5 is fall risk; contact physician) : 9    Cognitive Screening   1) Repeat 3 items (Leader, Season, Table)    2) Clock draw: NORMAL  3) 3 item recall: Recalls 3 objects  Results: 3 items recalled: COGNITIVE IMPAIRMENT LESS LIKELY    Mini-CogTM Copyright S Aliza. Licensed by the author for use in [facility]; " reprinted with permission (sonitesh@.[facility]). All rights reserved.      Do you have sleep apnea, excessive snoring or daytime drowsiness?: no    Reviewed and updated as needed this visit by clinical staff  Tobacco  Allergies  Meds  Med Hx  Surg Hx  Fam Hx  Soc Hx        Reviewed and updated as needed this visit by Provider  Tobacco  Allergies  Meds  Problems  Med Hx  Surg Hx  Fam Hx        Social History     Tobacco Use     Smoking status: Former Smoker     Smokeless tobacco: Never Used     Tobacco comment: quit in 1985   Substance Use Topics     Alcohol use: No     Alcohol/week: 0.0 standard drinks     If you drink alcohol do you typically have >3 drinks per day or >7 drinks per week? No    Alcohol Use 1/6/2020   Prescreen: >3 drinks/day or >7 drinks/week? Not Applicable   Prescreen: >3 drinks/day or >7 drinks/week? -           Hypertension Follow-up      Do you check your blood pressure regularly outside of the clinic? Yes     Are you following a low salt diet? Yes    Are your blood pressures ever more than 140 on the top number (systolic) OR more   than 90 on the bottom number (diastolic), for example 140/90? No      Current providers sharing in care for this patient include:   Patient Care Team:  Juanis Davison MD as PCP - General (Internal Medicine)  Juanis Davison MD as Assigned PCP    The following health maintenance items are reviewed in Epic and correct as of today:  Health Maintenance   Topic Date Due     DEXA  1950     HEPATITIS C SCREENING  1950     ZOSTER IMMUNIZATION (1 of 2) 06/28/2000     INFLUENZA VACCINE (1) 09/01/2019     FALL RISK ASSESSMENT  12/27/2019     PHQ-2  01/01/2020     MEDICARE ANNUAL WELLNESS VISIT  12/27/2019     MAMMO SCREENING  12/10/2020     DTAP/TDAP/TD IMMUNIZATION (2 - Td) 09/06/2022     ADVANCE CARE PLANNING  12/07/2022     LIPID  12/27/2023     COLONOSCOPY  12/01/2025     PNEUMOCOCCAL IMMUNIZATION 65+ LOW/MEDIUM RISK  Completed     IPV  IMMUNIZATION  Aged Out     MENINGITIS IMMUNIZATION  Aged Out     Labs reviewed in EPIC  BP Readings from Last 3 Encounters:   20 122/64   18 122/62   17 124/62    Wt Readings from Last 3 Encounters:   20 70.4 kg (155 lb 1.6 oz)   18 68.5 kg (151 lb 1.6 oz)   17 69.9 kg (154 lb)                  Patient Active Problem List   Diagnosis     Benign essential hypertension     Supraventricular tachycardia (H)     Hyperlipidemia with target LDL less than 130     Blood glucose elevated     Sleep disturbance     Past Surgical History:   Procedure Laterality Date     C/SECTION, LOW TRANSVERSE      x2     HERNIA REPAIR         Social History     Tobacco Use     Smoking status: Former Smoker     Smokeless tobacco: Never Used     Tobacco comment: quit in    Substance Use Topics     Alcohol use: No     Alcohol/week: 0.0 standard drinks     Family History   Problem Relation Age of Onset     Lung Cancer Father      Lung Cancer Brother      Arrhythmia Brother      Arrhythmia Brother      Coronary Artery Disease Brother         MI @55 and      Coronary Artery Disease Brother         MI @55 and  @76     Coronary Artery Disease Brother         MI @54 and          Current Outpatient Medications   Medication Sig Dispense Refill     Ascorbic Acid (VITAMIN C PO) Take 1,635 mg by mouth daily       co-enzyme Q-10 100 MG CAPS capsule Take 100 mg by mouth daily       diltiazem ER COATED BEADS (CARDIZEM CD) 240 MG 24 hr capsule Take 1 capsule (240 mg) by mouth daily 90 capsule 3     Fish Oil-Cholecalciferol (FISH OIL + D3) 9220-1897 MG-UNIT CAPS Take 2 capsules by mouth daily       ibuprofen (ADVIL) 200 MG tablet Take 1-2 tablets by mouth as needed       irbesartan (AVAPRO) 150 MG tablet Take 1 tablet (150 mg) by mouth daily 90 tablet 3     nitroGLYcerin (NITROSTAT) 0.4 MG sublingual tablet For chest pain place 1 tablet under the tongue every 5 minutes for 3 doses. If symptoms persist 5  "minutes after 1st dose call 911. 30 tablet 1     Red Yeast Rice Extract 600 MG CAPS Take 600 mg by mouth daily       vitamin D3 (CHOLECALCIFEROL) 2000 units tablet Take 1 tablet by mouth daily       Allergies   Allergen Reactions     Sulfa Drugs Anaphylaxis     Recent Labs   Lab Test 12/27/18  1455 12/27/17  0815   A1C 5.5 5.6   * 90   HDL 39* 32*   TRIG 107 75   ALT 20 32   CR 0.53 0.47*   GFRESTIMATED >90 >90   GFRESTBLACK >90 >90   POTASSIUM 4.2 4.2   TSH  --  1.85      Mammogram Screening: Mammogram Screening: Patient over age 50, mutual decision to screen reflected in health maintenance.    Review of Systems   Constitutional: Negative for chills and fever.   HENT: Negative for congestion, ear pain, hearing loss and sore throat.    Eyes: Negative for pain and visual disturbance.   Respiratory: Negative for cough and shortness of breath.    Cardiovascular: Negative for chest pain, palpitations and peripheral edema.   Gastrointestinal: Positive for heartburn (switch to bev lemon tea; decrease caffeine  intake). Negative for abdominal pain, constipation, diarrhea, hematochezia and nausea.   Breasts:  Negative for tenderness, breast mass and discharge.   Genitourinary: Negative for dysuria, frequency, genital sores, hematuria, pelvic pain, urgency, vaginal bleeding and vaginal discharge.   Musculoskeletal: Positive for arthralgias (left shoulder over head limitations. ) and myalgias. Negative for joint swelling.   Skin: Negative for rash.   Neurological: Negative for dizziness, weakness, headaches and paresthesias.   Psychiatric/Behavioral: Negative for mood changes. The patient is not nervous/anxious.          OBJECTIVE:   /64   Pulse 75   Temp 98.8  F (37.1  C) (Oral)   Resp 16   Ht 1.581 m (5' 2.25\")   Wt 70.4 kg (155 lb 1.6 oz)   SpO2 98%   BMI 28.14 kg/m   Estimated body mass index is 28.14 kg/m  as calculated from the following:    Height as of this encounter: 1.581 m (5' 2.25\").    " Weight as of this encounter: 70.4 kg (155 lb 1.6 oz).  Physical Exam  GENERAL: healthy, alert and no distress  EYES: Eyes grossly normal to inspection, PERRL and conjunctivae and sclerae normal  HENT: ear canals and TM's normal, nose and mouth without ulcers or lesions  NECK: no adenopathy, no asymmetry, masses, or scars and thyroid normal to palpation  RESP: lungs clear to auscultation - no rales, rhonchi or wheezes  CV: regular rate and rhythm, normal S1 S2, no peripheral edema and peripheral pulses strong  ABDOMEN: soft, nontender, no hepatosplenomegaly, no masses and bowel sounds normal  MS: no gross musculoskeletal defects noted, no edema  NEURO: Normal strength and tone, sensory exam grossly normal, mentation intact, gait normal including heel/toe/tandem walking and Romberg normal  PSYCH: mentation appears normal, affect normal/bright    Diagnostic Test Results:  Labs reviewed in Epic    ASSESSMENT / PLAN:   (Z00.00) Encounter for Medicare annual wellness exam  (primary encounter diagnosis)  Comment: HEALTH CARE MAINTENANCE reviewed   Plan:     (I47.1) Supraventricular tachycardia (H)  Comment: intermittent episodes of tachycardia reported; better if limit caffeine; if more symptomatic, consider cardiology evaluation; pt aware and will consider.  Pulse 75 today, regular without ectopy  Plan: diltiazem ER COATED BEADS (CARDIZEM CD) 240 MG         24 hr capsule, Comprehensive metabolic panel          (I10) Benign essential hypertension  Comment: bp well controlled on ARB  Plan: Comprehensive metabolic panel, Albumin Random         Urine Quantitative with Creat Ratio, irbesartan        (AVAPRO) 150 MG tablet          (E78.5) Hyperlipidemia with target LDL less than 130  Comment: cardiac risk assessment reviewed;   Plan: Comprehensive metabolic panel, Lipid panel         reflex to direct LDL Fasting            COUNSELING:  Reviewed preventive health counseling, as reflected in patient instructions       Regular  "exercise       Healthy diet/nutrition    Estimated body mass index is 28.14 kg/m  as calculated from the following:    Height as of this encounter: 1.581 m (5' 2.25\").    Weight as of this encounter: 70.4 kg (155 lb 1.6 oz).         reports that she has quit smoking. She has never used smokeless tobacco.      Appropriate preventive services were discussed with this patient, including applicable screening as appropriate for cardiovascular disease, diabetes, osteopenia/osteoporosis, and glaucoma.  As appropriate for age/gender, discussed screening for colorectal cancer, prostate cancer, breast cancer, and cervical cancer. Checklist reviewing preventive services available has been given to the patient.    Reviewed patients plan of care and provided an AVS. The Basic Care Plan (routine screening as documented in Health Maintenance) for Shavonne meets the Care Plan requirement. This Care Plan has been established and reviewed with the Patient.    Counseling Resources:  ATP IV Guidelines  Pooled Cohorts Equation Calculator  Breast Cancer Risk Calculator  FRAX Risk Assessment  ICSI Preventive Guidelines  Dietary Guidelines for Americans, 2010  USDA's MyPlate  ASA Prophylaxis  Lung CA Screening    Juanis Davison MD  Internal Medicine  [facility] [place]  15 minutes in addition to HEALTH CARE MAINTENANCE are spent with patient, over 50% of that time spent providing counselling, discussing and reviewing medical conditions/concerns, meds and potential side effects.      Identified Health Risks:  "

## 2020-01-06 NOTE — PROGRESS NOTES
She is at risk for lack of exercise and has been provided with information to increase physical activity for the benefit of her well-being.  Information on urinary incontinence and treatment options given to patient.  She is at risk for falling and has been provided with information to reduce the risk of falling at home.

## 2020-01-07 LAB
ALBUMIN SERPL-MCNC: 3.7 G/DL (ref 3.4–5)
ALP SERPL-CCNC: 82 U/L (ref 40–150)
ALT SERPL W P-5'-P-CCNC: 21 U/L (ref 0–50)
ANION GAP SERPL CALCULATED.3IONS-SCNC: 7 MMOL/L (ref 3–14)
AST SERPL W P-5'-P-CCNC: 14 U/L (ref 0–45)
BILIRUB SERPL-MCNC: 0.4 MG/DL (ref 0.2–1.3)
BUN SERPL-MCNC: 11 MG/DL (ref 7–30)
CALCIUM SERPL-MCNC: 9.1 MG/DL (ref 8.5–10.1)
CHLORIDE SERPL-SCNC: 105 MMOL/L (ref 94–109)
CHOLEST SERPL-MCNC: 215 MG/DL
CO2 SERPL-SCNC: 28 MMOL/L (ref 20–32)
CREAT SERPL-MCNC: 0.45 MG/DL (ref 0.52–1.04)
CREAT UR-MCNC: 14 MG/DL
GFR SERPL CREATININE-BSD FRML MDRD: >90 ML/MIN/{1.73_M2}
GLUCOSE SERPL-MCNC: 95 MG/DL (ref 70–99)
HDLC SERPL-MCNC: 50 MG/DL
LDLC SERPL CALC-MCNC: 152 MG/DL
MICROALBUMIN UR-MCNC: <5 MG/L
MICROALBUMIN/CREAT UR: NORMAL MG/G CR (ref 0–25)
NONHDLC SERPL-MCNC: 165 MG/DL
POTASSIUM SERPL-SCNC: 4 MMOL/L (ref 3.4–5.3)
PROT SERPL-MCNC: 7.1 G/DL (ref 6.8–8.8)
SODIUM SERPL-SCNC: 140 MMOL/L (ref 133–144)
TRIGL SERPL-MCNC: 63 MG/DL

## 2020-02-18 ENCOUNTER — TELEPHONE (OUTPATIENT)
Dept: FAMILY MEDICINE | Facility: CLINIC | Age: 70
End: 2020-02-18

## 2020-02-18 NOTE — TELEPHONE ENCOUNTER
Pt calling in-     Cough yesterday. Sick to her stomach- dry heaves.   Fever- low grade 99.9   SOB- No   No vomiting/diarrhea    Exposure to grandchildren- colds    Adv we can look for appt today/tomorrow. UC times/locations. Treat symptoms. REST, small to increasing sips/bites. Ok to take tylenol/ibuprofen for symptoms.     Pt will call w/ questions/concerns. She is going to wait it out and treat symptoms. Adv to be seen if not able to keep down fluids, breathing issues, etc.     Светлана BUNN RN

## 2020-08-24 ENCOUNTER — NURSE TRIAGE (OUTPATIENT)
Dept: FAMILY MEDICINE | Facility: CLINIC | Age: 70
End: 2020-08-24

## 2020-08-24 ENCOUNTER — APPOINTMENT (OUTPATIENT)
Dept: CT IMAGING | Facility: CLINIC | Age: 70
End: 2020-08-24
Attending: PHYSICIAN ASSISTANT
Payer: COMMERCIAL

## 2020-08-24 ENCOUNTER — HOSPITAL ENCOUNTER (EMERGENCY)
Facility: CLINIC | Age: 70
Discharge: HOME | End: 2020-08-24
Attending: PHYSICIAN ASSISTANT | Admitting: PHYSICIAN ASSISTANT
Payer: COMMERCIAL

## 2020-08-24 VITALS
BODY MASS INDEX: 27.22 KG/M2 | DIASTOLIC BLOOD PRESSURE: 81 MMHG | SYSTOLIC BLOOD PRESSURE: 162 MMHG | OXYGEN SATURATION: 96 % | TEMPERATURE: 96.8 F | HEART RATE: 69 BPM | RESPIRATION RATE: 16 BRPM | WEIGHT: 150 LBS

## 2020-08-24 DIAGNOSIS — S09.90XA CLOSED HEAD INJURY, INITIAL ENCOUNTER: ICD-10-CM

## 2020-08-24 PROCEDURE — 99284 EMERGENCY DEPT VISIT MOD MDM: CPT | Mod: 25

## 2020-08-24 PROCEDURE — 70450 CT HEAD/BRAIN W/O DYE: CPT

## 2020-08-24 ASSESSMENT — ENCOUNTER SYMPTOMS
HEADACHES: 1
MYALGIAS: 0
VOMITING: 0
LIGHT-HEADEDNESS: 1
DIZZINESS: 1

## 2020-08-24 NOTE — ED TRIAGE NOTES
Pt was assisting a dog out of a car and slipped and hit her head on the brickwall.  Denies LOC not on blood thinner.

## 2020-08-24 NOTE — TELEPHONE ENCOUNTER
"Based on protocol patient should be seen in the ER, Advised patient to have someone drive her. Patient verbalized understanding and is agreeable to plan.   Additional Information    Age over 65 years with and area of head swelling or bruise    Large swelling or bruise > 2 inches (5 cm)    Answer Assessment - Initial Assessment Questions  1. MECHANISM: \"How did the injury happen?\" For falls, ask: \"What height did you fall from?\" and \"What surface did you fall against?\"       I went over to [facility] with a friend of mine. I was standing outside the door. I slipped on the floor at [facility]- fell on the brick wall with my head- I have a huge lump  2. ONSET: \"When did the injury happen?\" (Minutes or hours ago)       About an hour ago   3. NEUROLOGIC SYMPTOMS: \"Was there any loss of consciousness?\" \"Are there any other neurological symptoms?\"       I did not. No not really   4. MENTAL STATUS: \"Does the person know who he is, who you are, and where he is?\"       Yes   5. LOCATION: \"What part of the head was hit?\"       Upper left side of my head   6. SCALP APPEARANCE: \"What does the scalp look like? Is it bleeding now?\" If so, ask: \"Is it difficult to stop?\"       No   7. SIZE: For cuts, bruises, or swelling, ask: \"How large is it?\" (e.g., inches or centimeters)       Swelling- goose egg on my head   8. PAIN: \"Is there any pain?\" If so, ask: \"How bad is it?\"  (e.g., Scale 1-10; or mild, moderate, severe)      Yes- I have ice on it and it seems like it hurts really bad- and pressure on right eye 5/10  9. TETANUS: For any breaks in the skin, ask: \"When was the last tetanus booster?\"      No   10. OTHER SYMPTOMS: \"Do you have any other symptoms?\" (e.g., neck pain, vomiting)        No   11. PREGNANCY: \"Is there any chance you are pregnant?\" \"When was your last menstrual period?\"        n/a    Protocols used: HEAD INJURY-A-OH    Jen Mckeon RN on 8/24/2020 at 10:05 AM    "

## 2020-08-24 NOTE — ED PROVIDER NOTES
"History     Chief Complaint:  Head Injury       The history is provided by the patient.     Shavonne Lundberg is a 70 year old female, not currently anticoagulated, with a history of hypertension, tachycardia, and cardiac dysrhythmia among others listed below, who presents for evaluation of a head injury that occurred around 0900 this morning with a mechanical fall. The patient reports that she was at [facility] earlier today and was trying to help move a dog when her shoes slipped on the floor. She states that she fell back, hitting the back of her head against the wall. The patient sat down for a while after the incident, but was able to ambulate without difficulty afterwards. Here, she reports \"feeling a little dizzy and lightheaded at times\". She denies loss of consciousness, vomiting, headache, arm or leg pain, or other injuries.     Allergies:  Sulfa drugs     Medications:   Diltiazem  Irbesartan  Nitroglycerin  Vitamin D3    Medical History:   Hypertension  Tachycardia  Hyperlipidemia  Cardiac dysrhythmia  Hernia    Surgical History   C section x2  Hernia repair    Family History:   Father -  Lung cancer  Mother -  CHF  Brother(s) -  Arrhythmia, CAD    Social History:  The patient was unaccompanied to the ED.  Smoking Status: Former - 1985  Smokeless Tobacco: Never  Alcohol Use: No  Drug Use: No       Review of Systems   Gastrointestinal: Negative for vomiting.   Musculoskeletal: Negative for myalgias.   Neurological: Positive for dizziness, light-headedness and headaches.        Negative for loss of consciousness   All other systems reviewed and are negative.    Physical Exam     Patient Vitals for the past 24 hrs:   BP Temp Temp src Pulse Resp SpO2 Weight   08/24/20 1147 (!) 165/87 96.8  F (36  C) Temporal 71 16 100 % 68 kg (150 lb)      Physical Exam  General: Alert and interactive. Appears well. Cooperative and pleasant.   Head: Small hematoma to top of scalp. No open wounds. No saez sign or racoon eyes. "   Eyes: The pupils are equal and round. EOMs intact. No scleral icterus.  ENT: No abnormalities to the external nose or ears. Mucous membranes moist. Posterior oropharynx is non-erythematous.  Neck: Trachea is in the midline. No nuchal rigidity.     CV: Regular rate and rhythm. S1 and S2 normal without murmur, click, gallop or rub.   Resp: Breath sounds are clear bilaterally, without rhonchi, wheezes, rales. Non-labored, no retractions or accessory muscle use.     GI: Abdomen is soft without distension. No tenderness to palpation. No peritoneal signs.    MS: Moving all extremities well. Good muscle tone.   Skin: Warm and dry. No rash or lesions noted.  Neuro: Alert and oriented x 3. No focal neurologic deficits. Good strength and sensation in upper and lower extremities. Psych: Awake. Alert.  Normal affect. Appropriate interactions.  Lymph: No anterior or posterior cervical lymphadenopathy noted.    Emergency Department Course     Imaging:  Radiology results were communicated with the patient who voiced understanding of the findings.    CT Head w/o contrast:  IMPRESSION:   No evidence of acute intracranial hemorrhage, mass, or   herniation.   Reading per radiology.    Emergency Department Course:    1203 Nursing notes and vitals reviewed.    1215 I performed an exam of the patient as documented above.     1250 The patient was sent for CT while in the emergency department, results above.     1400 Findings and plan explained to the Patient. Patient discharged home with instructions regarding supportive care, medications, and reasons to return. The importance of close follow-up was reviewed.     Impression & Plan   Medical Decision Making:  Shavonne Lundberg is a 70 year old female who presents for evaluation of closed head injury. History and exam are most consistent with concussion. The differential diagnosis also includes skull fracture and various types of intracranial hemorrhage (epidural hematoma, subdural  hematoma). The patient s did not present with  red flag  characteristics based on established clinical guidelines to suggest more serious intracranial injury. The patient does not take any blood thinning agents. I have discussed the risk/benefit analysis with the patient regarding CT imaging. She cannot be ruled out via [place] Head CT rules given her age, thus we decided to go ahead with imaging, which is negative. She understands return required for worsening headache, vomiting, confusion, and other concerning symptoms. I provided information regarding on head injury in writing upon discharge. I recommended primary care follow up in 2-3 days for recheck, and return precautions as above.    Diagnosis:     ICD-10-CM    1. Closed head injury, initial encounter  S09.90XA         Disposition:  Discharged to home.    Scribe Disclosure:  I, Annalee Mckinney, am serving as a scribe at 12:04 PM on 8/24/2020 to document services personally performed by Eden Serrano,  based on my observations and the provider's statements to me.      Eden Serrano, PIERO  08/24/20 [id number]

## 2020-08-24 NOTE — ED AVS SNAPSHOT
[facility]  [address]  [facility] [address]  Phone:  [phone]  Fax:  [phone]                                    Shavonne Lundberg   MRN: [id number]    Department:  [facility]   Date of Visit:  8/24/2020           After Visit Summary Signature Page    I have received my discharge instructions, and my questions have been answered. I have discussed any challenges I see with this plan with the nurse or doctor.    ..........................................................................................................................................  Patient/Patient Representative Signature      ..........................................................................................................................................  Patient Representative Print Name and Relationship to Patient    ..................................................               ................................................  Date                                   Time    ..........................................................................................................................................  Reviewed by Signature/Title    ...................................................              ..............................................  Date                                               Time          22EPIC Rev 08/18

## 2020-12-11 DIAGNOSIS — I10 BENIGN ESSENTIAL HYPERTENSION: ICD-10-CM

## 2020-12-14 NOTE — TELEPHONE ENCOUNTER
Routing refill request to provider for review/approval because:  Labs out of range:  BP, creatinine    BP Readings from Last 3 Encounters:   08/24/20 (!) 162/81   01/06/20 122/64   12/27/18 122/62     Creatinine   Date Value Ref Range Status   01/06/2020 0.45 (L) 0.52 - 1.04 mg/dL Final     June MORGAN RN, BSN

## 2020-12-22 RX ORDER — IRBESARTAN 150 MG/1
150 TABLET ORAL DAILY
Qty: 90 TABLET | Refills: 0 | Status: SHIPPED | OUTPATIENT
Start: 2020-12-22 | End: 2021-01-07

## 2020-12-22 NOTE — TELEPHONE ENCOUNTER
Pt has appt on 1/7/2021.  BLOOD PRESSURE elevate in the summer when seen in ER.   Ongoing monitoring of BLOOD PRESSURE.   Interim Rx provided.

## 2021-01-07 ENCOUNTER — OFFICE VISIT (OUTPATIENT)
Dept: FAMILY MEDICINE | Facility: CLINIC | Age: 71
End: 2021-01-07
Payer: COMMERCIAL

## 2021-01-07 VITALS
WEIGHT: 155.6 LBS | HEIGHT: 63 IN | RESPIRATION RATE: 15 BRPM | DIASTOLIC BLOOD PRESSURE: 68 MMHG | BODY MASS INDEX: 27.57 KG/M2 | OXYGEN SATURATION: 99 % | TEMPERATURE: 97.9 F | SYSTOLIC BLOOD PRESSURE: 120 MMHG | HEART RATE: 75 BPM

## 2021-01-07 DIAGNOSIS — I10 BENIGN ESSENTIAL HYPERTENSION: ICD-10-CM

## 2021-01-07 DIAGNOSIS — I47.10 SUPRAVENTRICULAR TACHYCARDIA (H): ICD-10-CM

## 2021-01-07 DIAGNOSIS — I49.8 OTHER CARDIAC ARRHYTHMIA: ICD-10-CM

## 2021-01-07 DIAGNOSIS — Z00.00 MEDICARE ANNUAL WELLNESS VISIT, SUBSEQUENT: Primary | ICD-10-CM

## 2021-01-07 DIAGNOSIS — Z13.6 CARDIOVASCULAR SCREENING; LDL GOAL LESS THAN 130: ICD-10-CM

## 2021-01-07 DIAGNOSIS — Z11.59 ENCOUNTER FOR HEPATITIS C SCREENING TEST FOR LOW RISK PATIENT: ICD-10-CM

## 2021-01-07 DIAGNOSIS — R53.83 FATIGUE, UNSPECIFIED TYPE: ICD-10-CM

## 2021-01-07 LAB — HGB BLD-MCNC: 13.2 G/DL (ref 11.7–15.7)

## 2021-01-07 PROCEDURE — 80053 COMPREHEN METABOLIC PANEL: CPT | Performed by: INTERNAL MEDICINE

## 2021-01-07 PROCEDURE — 85018 HEMOGLOBIN: CPT | Performed by: INTERNAL MEDICINE

## 2021-01-07 PROCEDURE — 84443 ASSAY THYROID STIM HORMONE: CPT | Performed by: INTERNAL MEDICINE

## 2021-01-07 PROCEDURE — 82043 UR ALBUMIN QUANTITATIVE: CPT | Performed by: INTERNAL MEDICINE

## 2021-01-07 PROCEDURE — G0438 PPPS, INITIAL VISIT: HCPCS | Performed by: INTERNAL MEDICINE

## 2021-01-07 PROCEDURE — 86803 HEPATITIS C AB TEST: CPT | Performed by: INTERNAL MEDICINE

## 2021-01-07 PROCEDURE — 80061 LIPID PANEL: CPT | Performed by: INTERNAL MEDICINE

## 2021-01-07 PROCEDURE — 36415 COLL VENOUS BLD VENIPUNCTURE: CPT | Performed by: INTERNAL MEDICINE

## 2021-01-07 PROCEDURE — 99214 OFFICE O/P EST MOD 30 MIN: CPT | Mod: 25 | Performed by: INTERNAL MEDICINE

## 2021-01-07 RX ORDER — IRBESARTAN 150 MG/1
150 TABLET ORAL DAILY
Qty: 90 TABLET | Refills: 3 | Status: SHIPPED | OUTPATIENT
Start: 2021-01-07 | End: 2021-01-15

## 2021-01-07 RX ORDER — DILTIAZEM HYDROCHLORIDE 240 MG/1
240 CAPSULE, COATED, EXTENDED RELEASE ORAL DAILY
Qty: 90 CAPSULE | Refills: 3 | Status: SHIPPED | OUTPATIENT
Start: 2021-01-07 | End: 2021-01-15

## 2021-01-07 ASSESSMENT — MIFFLIN-ST. JEOR: SCORE: 1186.99

## 2021-01-07 NOTE — PROGRESS NOTES
Assessment & Plan     (Z00.00) Medicare annual wellness visit, subsequent  (primary encounter diagnosis)  Comment: HEALTH CARE MAINTENANCE reviewed.  Plan: reviewed immunizations, declines flu shot; previously had pneumonia x 2; discussed Shingrix. She will consider pharmacy option   Colonoscopy done 2015, OK, consider 10 year follow up ( 2025)    (I10) Benign essential hypertension  Comment: well controlled; meds well tolerated; goals of therapy and treatment; due for labs to assess renal functions and electrolytes.   Plan: irbesartan (AVAPRO) 150 MG tablet, Albumin         Random Urine Quantitative with Creat Ratio,         Comprehensive metabolic panel          (I47.1) Supraventricular tachycardia (H)  Comment: caffeine has been a trigger; pt reporting symptoms weekly; due to persistent symptoms; recommend leadless EKG and then may follow up with cardiology  Plan: diltiazem ER COATED BEADS (CARDIZEM CD) 240 MG         24 hr capsule, Leadless EKG Monitor 8 to 14         Days, CARDIOLOGY EVAL ADULT REFERRAL          (Z13.6) CARDIOVASCULAR SCREENING; LDL GOAL LESS THAN 130  Comment: monitor LDL  Plan: Lipid panel reflex to direct LDL Fasting          (R53.83) Fatigue, unspecified type  Comment: multifactorial; fatigue may be due to prolonged SVT; high HRT rate  Plan: TSH with free T4 reflex, Hemoglobin         (Z11.59) Encounter for hepatitis C screening test for low risk patient  Comment: pt desires screening. Low risk  Plan: Hepatitis C Screen Reflex to HCV RNA Quant and Genotype              Review of the result(s) of each unique test - several different findings      75 minutes spent on the date of the encounter doing chart review, review of test results, interpretation of tests, patient visit, documentation and related to preventive visit   35 minutes in addition to HEALTH CARE MAINTENANCE are spent with patient, over 50% of that time spent providing counselling, discussing and reviewing medical  "conditions/concerns, meds and potential side effects.       BMI:   Estimated body mass index is 28.01 kg/m  as calculated from the following:    Height as of this encounter: 1.588 m (5' 2.5\").    Weight as of this encounter: 70.6 kg (155 lb 9.6 oz).         MEDICATIONS:   Orders Placed This Encounter   Medications     irbesartan (AVAPRO) 150 MG tablet     Sig: Take 1 tablet (150 mg) by mouth daily     Dispense:  90 tablet     Refill:  3     diltiazem ER COATED BEADS (CARDIZEM CD) 240 MG 24 hr capsule     Sig: Take 1 capsule (240 mg) by mouth daily     Dispense:  90 capsule     Refill:  3          - Continue other medications without change  CONSULTATION/REFERRAL to Cardiology after Leadless EKG  discussed future fasting orders  Work on weight loss  Regular exercise    Return in about 3 weeks (around 1/28/2021) for cardiology to assess leadless EKG findings.    Juanis Davison MD  Internal Medicine   [facility] JEROME Marvin is a 70 year old who presents to clinic today for the following health issues     HPI       Hypertension Follow-up      Do you check your blood pressure regularly outside of the clinic? Yes     Are you following a low salt diet? Yes    Are your blood pressures ever more than 140 on the top number (systolic) OR more   than 90 on the bottom number (diastolic), for example 140/90? No      How many servings of fruits and vegetables do you eat daily?  2-3    On average, how many sweetened beverages do you drink each day (Examples: soda, juice, sweet tea, etc.  Do NOT count diet or artificially sweetened beverages)?   0    How many days per week do you exercise enough to make your heart beat faster? 3 or less    How many minutes a day do you exercise enough to make your heart beat faster? 10 - 19    How many days per week do you miss taking your medication? 0    Annual Wellness Visit    Patient has been advised of split billing requirements and " "indicates understanding: Yes     Are you in the first 12 months of your Medicare Part B coverage?  No    Physical Health:    In general, how would you rate your overall physical health? good    Outside of work, how many days during the week do you exercise?2-3 days/week    Outside of work, approximately how many minutes a day do you exercise?15-30 minutes    If you drink alcohol do you typically have >3 drinks per day or >7 drinks per week? Not Applicable    Do you usually eat at least 4 servings of fruit and vegetables a day, include whole grains & fiber and avoid regularly eating high fat or \"junk\" foods? Yes    Do you have any problems taking medications regularly? No    Do you have any side effects from medications? none    Needs assistance for the following daily activities: no assistance needed    Which of the following safety concerns are present in your home?  none identified     Hearing impairment: No    In the past 6 months, have you been bothered by leaking of urine? no    Mental Health:    In general, how would you rate your overall mental or emotional health? excellent  PHQ-2 Score: 0    Do you feel safe in your environment? Yes    Have you ever done Advance Care Planning? (For example, a Health Directive, POLST, or a discussion with a medical provider or your loved ones about your wishes)? Yes, patient states has an Advance Care Planning document and will bring a copy to the clinic.    Fall risk:  Fallen 2 or more times in the past year?: No  Any fall with injury in the past year?: Yes  Timed Up and Go Test (>13.5 is fall risk; contact physician) : 9    Cognitive Screenin) Repeat 3 items (Leader, Season, Table)    2) Clock draw: NORMAL  3) 3 item recall: Recalls 3 objects  Results: 3 items recalled: COGNITIVE IMPAIRMENT LESS LIKELY    Mini-CogTM Copyright S Aliza. Licensed by the author for use in [facility]; reprinted with permission (heidi@.[email or web address]). All rights reserved.      Do you " "have sleep apnea, excessive snoring or daytime drowsiness?: no    Current providers sharing in care for this patient include:   Patient Care Team:  Juanis Davison MD as PCP - General (Internal Medicine)  Juanis Davison MD as Assigned PCP    Patient has been advised of split billing requirements and indicates understanding: Yes      Review of Systems   CONSTITUTIONAL: NEGATIVE for fever, chills, change in weight  INTEGUMENTARY/SKIN: NEGATIVE for worrisome rashes, moles or lesions  ENT/MOUTH: NEGATIVE for ear, mouth and throat problems  RESP: NEGATIVE for significant cough or SOB  CV: hx of SVT many years ago was placed on CCB; still having intermittent symptoms but less since she has limited caffeine. No cp  Reported; hx of varicose vein surgery  GI: NEGATIVE for nausea, abdominal pain, heartburn, or change in bowel habits  MUSCULOSKELETAL: NEGATIVE for significant arthralgias or myalgia  NEURO: NEGATIVE for weakness, dizziness or paresthesias  ENDOCRINE: NEGATIVE for temperature intolerance, skin/hair changes  HEME/ALLERGY/IMMUNE: NEGATIVE for bleeding problems  PSYCHIATRIC: NEGATIVE for changes in mood or affect      Objective    /68   Pulse 75   Temp 97.9  F (36.6  C) (Oral)   Resp 15   Ht 1.588 m (5' 2.5\")   Wt 70.6 kg (155 lb 9.6 oz)   SpO2 99%   BMI 28.01 kg/m    Body mass index is 28.01 kg/m .  Physical Exam   GENERAL: healthy, alert and no distress  EYES: Eyes grossly normal to inspection  HENT: ear canals and TM's normal, nose and mouth without ulcers or lesions  NECK: no adenopathy, no asymmetry, masses, or scars and thyroid normal to palpation  RESP: lungs clear to auscultation - no rales, rhonchi or wheezes  CV: regular rate and rhythm, normal S1 S2, no S3 or S4, no murmur, click or rub, no peripheral edema and peripheral pulses strong  ABDOMEN: soft, nontender, no hepatosplenomegaly, no masses and bowel sounds normal  MS: no gross musculoskeletal defects noted, no edema  SKIN: " no suspicious lesions or rashes  NEURO: Normal strength and tone, mentation intact and speech normal  PSYCH: mentation appears normal, affect normal/bright

## 2021-01-08 LAB
ALBUMIN SERPL-MCNC: 3.8 G/DL (ref 3.4–5)
ALP SERPL-CCNC: 82 U/L (ref 40–150)
ALT SERPL W P-5'-P-CCNC: 28 U/L (ref 0–50)
ANION GAP SERPL CALCULATED.3IONS-SCNC: 6 MMOL/L (ref 3–14)
AST SERPL W P-5'-P-CCNC: 17 U/L (ref 0–45)
BILIRUB SERPL-MCNC: 0.4 MG/DL (ref 0.2–1.3)
BUN SERPL-MCNC: 10 MG/DL (ref 7–30)
CALCIUM SERPL-MCNC: 9.2 MG/DL (ref 8.5–10.1)
CHLORIDE SERPL-SCNC: 105 MMOL/L (ref 94–109)
CHOLEST SERPL-MCNC: 185 MG/DL
CO2 SERPL-SCNC: 29 MMOL/L (ref 20–32)
CREAT SERPL-MCNC: 0.48 MG/DL (ref 0.52–1.04)
CREAT UR-MCNC: 26 MG/DL
GFR SERPL CREATININE-BSD FRML MDRD: >90 ML/MIN/{1.73_M2}
GLUCOSE SERPL-MCNC: 88 MG/DL (ref 70–99)
HDLC SERPL-MCNC: 45 MG/DL
LDLC SERPL CALC-MCNC: 117 MG/DL
MICROALBUMIN UR-MCNC: <5 MG/L
MICROALBUMIN/CREAT UR: NORMAL MG/G CR (ref 0–25)
NONHDLC SERPL-MCNC: 140 MG/DL
POTASSIUM SERPL-SCNC: 4 MMOL/L (ref 3.4–5.3)
PROT SERPL-MCNC: 7.3 G/DL (ref 6.8–8.8)
SODIUM SERPL-SCNC: 140 MMOL/L (ref 133–144)
TRIGL SERPL-MCNC: 114 MG/DL
TSH SERPL DL<=0.005 MIU/L-ACNC: 0.99 MU/L (ref 0.4–4)

## 2021-01-11 LAB — HCV AB SERPL QL IA: NONREACTIVE

## 2021-01-12 ENCOUNTER — HOSPITAL ENCOUNTER (OUTPATIENT)
Dept: CARDIOLOGY | Facility: CLINIC | Age: 71
Discharge: HOME | End: 2021-01-12
Attending: INTERNAL MEDICINE | Admitting: INTERNAL MEDICINE
Payer: COMMERCIAL

## 2021-01-12 DIAGNOSIS — I47.10 SUPRAVENTRICULAR TACHYCARDIA (H): ICD-10-CM

## 2021-01-12 PROCEDURE — 93246 EXT ECG>7D<15D RECORDING: CPT

## 2021-01-12 PROCEDURE — 93248 EXT ECG>7D<15D REV&INTERPJ: CPT | Performed by: INTERNAL MEDICINE

## 2021-01-13 ENCOUNTER — NURSE TRIAGE (OUTPATIENT)
Dept: NURSING | Facility: CLINIC | Age: 71
End: 2021-01-13

## 2021-01-13 NOTE — TELEPHONE ENCOUNTER
Patient was in the   Diltiazem and irbesartan   Per chart review the medications have been sent on 1/7/21  She will call her pharmacy to verify they received the prescription and processing the refill.     Marysol Garcia RN on 1/13/2021 at 11:10 AM          Additional Information    Caller has medication question only, adult not sick, and triager answers question    Negative: DOUBLE DOSE (an extra dose or lesser amount) of antibiotic drug and NO symptoms    Negative: DOUBLE DOSE (an extra dose or lesser amount) of over-the-counter (OTC) drug and NO symptoms    Negative: Caller has NON-URGENT medication question about med that PCP prescribed and triager unable to answer question    Negative: Caller requesting a NON-URGENT new prescription or refill and triager unable to refill per department policy    Negative: Request for URGENT new prescription or refill of 'essential' medication (i.e., likelihood of harm to patient if not taken) and triager unable to fill per department policy    Negative: Prescription not at pharmacy and was prescribed today by PCP    Negative: Pharmacy calling with prescription questions and triager unable to answer question    Negative: Caller has urgent medication question about med that PCP prescribed and triager unable to answer question    Negative: Caller has medication question about med not prescribed by PCP and triager unable to answer question (e.g., compatibility with other med, storage)    Negative: DOUBLE DOSE (an extra dose or lesser amount) of prescription drug and NO symptoms (Exception: a double dose of antibiotics)    Negative: Diabetes drug error or overdose (e.g., took wrong type of insulin or took extra dose)    Negative: MORE THAN A DOUBLE DOSE of a prescription or over-the-counter (OTC) drug    Negative: DOUBLE DOSE (an extra dose or lesser amount) of over-the-counter (OTC) drug and any symptoms (e.g., dizziness, nausea, pain, sleepiness)    Negative: DOUBLE DOSE (an  extra dose or lesser amount) of prescription drug and any symptoms (e.g., dizziness, nausea, pain, sleepiness)    Negative: Took another person's prescription drug    Negative: Drug overdose and triager unable to answer question    Negative: Caller requesting information unrelated to medicine    Negative: Caller requesting a prescription for Strep throat and has a positive culture result    Negative: Rash while taking a medication or within 3 days of stopping it    Negative: Immunization reaction suspected    Negative: Asthma and having symptoms of asthma (cough, wheezing, etc.)    Negative: Breastfeeding questions about mother's medicines and diet    Protocols used: MEDICATION QUESTION CALL-A-OH

## 2021-01-15 ENCOUNTER — NURSE TRIAGE (OUTPATIENT)
Dept: NURSING | Facility: CLINIC | Age: 71
End: 2021-01-15

## 2021-01-15 DIAGNOSIS — I47.10 SUPRAVENTRICULAR TACHYCARDIA (H): ICD-10-CM

## 2021-01-15 DIAGNOSIS — I10 BENIGN ESSENTIAL HYPERTENSION: ICD-10-CM

## 2021-01-15 RX ORDER — IRBESARTAN 150 MG/1
150 TABLET ORAL DAILY
Qty: 90 TABLET | Refills: 3 | Status: SHIPPED | OUTPATIENT
Start: 2021-01-15 | End: 2022-01-14

## 2021-01-15 RX ORDER — DILTIAZEM HYDROCHLORIDE 240 MG/1
240 CAPSULE, COATED, EXTENDED RELEASE ORAL DAILY
Qty: 90 CAPSULE | Refills: 3 | Status: SHIPPED | OUTPATIENT
Start: 2021-01-15 | End: 2022-01-11

## 2021-01-15 NOTE — TELEPHONE ENCOUNTER
Pt calling to report she was told to call [facility] in [place] who told her medications can only be filled through [facility] mail order location. (See previous message from 1/13/20).     Writer contacted [facility] in [place] where prescriptions were sent and was advised by Nisa that they cannot send out those medications as they are not specialty medications and the [place] location is specialty medications only. Prescriptions need to be sent to [facility] on [address]. Prescriptions at [place] location cancelled.     Pt will be out of medication in two days so she needs these sent to a local pharmacy. Will send to pt's preferred local pharmacy.       Additional Information    Caller has medication question only, adult not sick, and triager answers question    Protocols used: MEDICATION QUESTION CALL-A-

## 2021-03-31 ENCOUNTER — OFFICE VISIT (OUTPATIENT)
Dept: CARDIOLOGY | Facility: CLINIC | Age: 71
End: 2021-03-31
Payer: COMMERCIAL

## 2021-03-31 VITALS
OXYGEN SATURATION: 99 % | BODY MASS INDEX: 27.29 KG/M2 | WEIGHT: 154 LBS | DIASTOLIC BLOOD PRESSURE: 78 MMHG | SYSTOLIC BLOOD PRESSURE: 129 MMHG | HEIGHT: 63 IN | HEART RATE: 80 BPM

## 2021-03-31 DIAGNOSIS — I47.10 SUPRAVENTRICULAR TACHYCARDIA (H): ICD-10-CM

## 2021-03-31 DIAGNOSIS — R94.31 ABNORMAL ELECTROCARDIOGRAM: ICD-10-CM

## 2021-03-31 DIAGNOSIS — R00.2 PALPITATIONS: ICD-10-CM

## 2021-03-31 DIAGNOSIS — I47.29 NSVT (NONSUSTAINED VENTRICULAR TACHYCARDIA) (H): Primary | ICD-10-CM

## 2021-03-31 PROCEDURE — 99204 OFFICE O/P NEW MOD 45 MIN: CPT | Performed by: INTERNAL MEDICINE

## 2021-03-31 RX ORDER — ASPIRIN 81 MG/1
81 TABLET ORAL DAILY
COMMUNITY

## 2021-03-31 ASSESSMENT — MIFFLIN-ST. JEOR: SCORE: 1187.67

## 2021-03-31 NOTE — PROGRESS NOTES
Service Date: 2021      HISTORY OF PRESENT ILLNESS:    It was my pleasure seeing Ms. Shavonne Lundberg, a delightful 70-year-old female who has been referred by Dr. Davison for evaluation of palpitation.      The patient has taken diltiazem  mg since .  She recalls that at the time she was waking up in the morning feeling that her heart was racing.  This happened on a daily basis.  After she was placed on diltiazem, symptoms improved.  Diltiazem has also been useful for treatment of hypertension.      She did well over the years, but more recently she experienced palpitation again.  This time, it is more like a sensation that her heart is jumping or skipping.  On occasion, she becomes mildly dizzy.  No syncope or near-syncope.  She also complains of fatigue.      She stopped drinking caffeine about 6 weeks ago and since then symptoms have improved.      The patient wore a 10-day leadless ECG monitor in 2021.  This showed occasional PVCs (burden 1.3%) and a 5-beat run of VT.  There were 22 SVT runs, longest of 18 beats.      The patient is a retired .  She has been  for many years.  She has 2 children.  She has not smoked for over 30 years.  She does not drink alcohol.      FAMILY HISTORY:  Significant for CAD in many of her brothers and her mother.  Two of her brothers  in their 50s of complications of heart disease.        PHYSICAL EXAMINATION:   VITAL SIGNS:  Blood pressure 129/78, pulse 80 and regular, weight 69.9 kg, height 160 cm.   GENERAL:  She is a very pleasant woman in no distress.   HEENT:  Normocephalic.   NECK:  Supple without bruits.   LUNGS:  Clear.   CARDIOVASCULAR:  Normal JVP, regular rhythm, without gallop, murmur or rub.   ABDOMEN:  Soft, nontender.   EXTREMITIES:  No edema.        DIAGNOSTIC STUDIES:     - Recent laboratory tests:  Sodium 140, potassium 4.0, creatinine 0.48.  TSH 0.99, hemoglobin 13.2.   - For results of her recent cardiac monitor, see  HPI.   - A 12-lead ECG from  was normal.        IMPRESSION:    1.  Palpitation.  Ms. Golden's symptoms earlier this year were consistent with symptomatic ectopic beats.  Occasional PVCs and brief runs of SVT were documented on a cardiac monitor.  None of these arrhythmias is particularly concerning.  No additional treatment is needed, especially given that her symptoms have improved after caffeine restriction.         Because of her PVCs and run of NSVT (as well as strongly positive history for CAD), it is reasonable to pursue a stress test.      RECOMMENDATIONS:    - Exercise echocardiogram.      We will call the patient with the results of her tests and make followup arrangements as needed.      I do appreciate the opportunity to be involved in this delightful patient's care.        MOSES DE LA CRUZ MD, FACC         cc:      Juanis Davison MD    [facility]   [address] [place] [address]             D: 2021   T: 2021   MT: ROSSANA      Name:     DEMETRIO GOLDEN   MRN:      [id number]        Account:      [id number]   :      1950           Service Date: 2021      Document: [id number]

## 2021-03-31 NOTE — LETTER
3/31/2021    [place]uan[place]ison [place]  [address] [address] [address] [address]    RE: Shavonne Lundberg       Dear Colleague,    I had the pleasure of seeing Shavonne Lundberg in the [facility].    HPI and Plan:   See dictation [id number]    Orders Placed This Encounter   Procedures     Exercise Stress Echocardiogram       Orders Placed This Encounter   Medications     Coenzyme Q10-Fish Oil-Vit E (CO-Q 10 OMEGA-3 FISH OIL PO)     aspirin 81 MG EC tablet     Sig: Take 81 mg by mouth daily     Garlic 1000 MG CAPS     Ubiquinol 100 MG CAPS     UNABLE TO FIND     Sig: daily MEDICATION NAME: GOLO  Release dietary supplement       There are no discontinued medications.      Encounter Diagnoses   Name Primary?     Supraventricular tachycardia (H)      Other cardiac arrhythmia      NSVT (nonsustained ventricular tachycardia) (H) Yes     Abnormal electrocardiogram        CURRENT MEDICATIONS:  Current Outpatient Medications   Medication Sig Dispense Refill     Ascorbic Acid (VITAMIN C PO) Take 1,635 mg by mouth daily       aspirin 81 MG EC tablet Take 81 mg by mouth daily       co-enzyme Q-10 100 MG CAPS capsule Take 100 mg by mouth daily       Coenzyme Q10-Fish Oil-Vit E (CO-Q 10 OMEGA-3 FISH OIL PO)        diltiazem ER COATED BEADS (CARDIZEM CD) 240 MG 24 hr capsule Take 1 capsule (240 mg) by mouth daily 90 capsule 3     Fish Oil-Cholecalciferol (FISH OIL + D3) 9301-2910 MG-UNIT CAPS Take 2 capsules by mouth daily       Garlic 1000 MG CAPS        irbesartan (AVAPRO) 150 MG tablet Take 1 tablet (150 mg) by mouth daily 90 tablet 3     Red Yeast Rice Extract 600 MG CAPS Take 600 mg by mouth daily       Ubiquinol 100 MG CAPS        UNABLE TO FIND daily MEDICATION NAME: GOLO  Release dietary supplement       vitamin D3 (CHOLECALCIFEROL) 2000 units tablet Take 1 tablet by mouth daily       ibuprofen (ADVIL) 200 MG tablet Take 1-2 tablets by mouth as needed        nitroGLYcerin (NITROSTAT) 0.4 MG sublingual tablet For chest pain place 1 tablet under the tongue every 5 minutes for 3 doses. If symptoms persist 5 minutes after 1st dose call 911. 30 tablet 1       ALLERGIES     Allergies   Allergen Reactions     Sulfa Drugs Anaphylaxis       PAST MEDICAL HISTORY:  Past Medical History:   Diagnosis Date     HTN (hypertension)      Tachycardia        PAST SURGICAL HISTORY:  Past Surgical History:   Procedure Laterality Date     C/SECTION, LOW TRANSVERSE      x2     HERNIA REPAIR         FAMILY HISTORY:  Family History   Problem Relation Age of Onset     Lung Cancer Father      Lung Cancer Brother      Arrhythmia Brother      Arrhythmia Brother      Coronary Artery Disease Brother         MI @55 and      Coronary Artery Disease Brother         MI @55 and  @76     Coronary Artery Disease Brother         MI @54 and        SOCIAL HISTORY:  Social History     Socioeconomic History     Marital status:      Spouse name: None     Number of children: 2     Years of education: None     Highest education level: None   Occupational History     Occupation: [employer]     Employer: [employer]   Social Needs     Financial resource strain: None     Food insecurity     Worry: None     Inability: None     Transportation needs     Medical: None     Non-medical: None   Tobacco Use     Smoking status: Former Smoker     Smokeless tobacco: Never Used     Tobacco comment: quit in    Substance and Sexual Activity     Alcohol use: No     Alcohol/week: 0.0 standard drinks     Drug use: No     Sexual activity: Not Currently     Partners: Male   Lifestyle     Physical activity     Days per week: None     Minutes per session: None     Stress: None   Relationships     Social connections     Talks on phone: None     Gets together: None     Attends Episcopal service: None     Active member of club or organization: None     Attends meetings of clubs or organizations: None      Relationship status: None     Intimate partner violence     Fear of current or ex partner: None     Emotionally abused: None     Physically abused: None     Forced sexual activity: None   Other Topics Concern     Parent/sibling w/ CABG, MI or angioplasty before 65F 55M? Yes   Social History Narrative     None       Review of Systems:  Skin:  Negative       Eyes:  Positive for glasses    ENT:  Negative      Respiratory:  Negative       Cardiovascular:  Negative Positive for;lightheadedness;fatigue    Gastroenterology: Negative      Genitourinary:  Negative      Musculoskeletal:  Positive for arthritis    Neurologic:  Negative      Psychiatric:  Negative      Heme/Lymph/Imm:  Negative      Endocrine:  Negative        Thank you for allowing me to participate in the care of your patient.      Sincerely,     Ava Alvarado MD     [facility]    cc:   Juanis Davison MD  [address] [place] [address]

## 2021-03-31 NOTE — PROGRESS NOTES
HPI and Plan:   See dictation [id number]    Orders Placed This Encounter   Procedures     Exercise Stress Echocardiogram       Orders Placed This Encounter   Medications     Coenzyme Q10-Fish Oil-Vit E (CO-Q 10 OMEGA-3 FISH OIL PO)     aspirin 81 MG EC tablet     Sig: Take 81 mg by mouth daily     Garlic 1000 MG CAPS     Ubiquinol 100 MG CAPS     UNABLE TO FIND     Sig: daily MEDICATION NAME: GOLO  Release dietary supplement       There are no discontinued medications.      Encounter Diagnoses   Name Primary?     Supraventricular tachycardia (H)      Other cardiac arrhythmia      NSVT (nonsustained ventricular tachycardia) (H) Yes     Abnormal electrocardiogram        CURRENT MEDICATIONS:  Current Outpatient Medications   Medication Sig Dispense Refill     Ascorbic Acid (VITAMIN C PO) Take 1,635 mg by mouth daily       aspirin 81 MG EC tablet Take 81 mg by mouth daily       co-enzyme Q-10 100 MG CAPS capsule Take 100 mg by mouth daily       Coenzyme Q10-Fish Oil-Vit E (CO-Q 10 OMEGA-3 FISH OIL PO)        diltiazem ER COATED BEADS (CARDIZEM CD) 240 MG 24 hr capsule Take 1 capsule (240 mg) by mouth daily 90 capsule 3     Fish Oil-Cholecalciferol (FISH OIL + D3) 9921-0448 MG-UNIT CAPS Take 2 capsules by mouth daily       Garlic 1000 MG CAPS        irbesartan (AVAPRO) 150 MG tablet Take 1 tablet (150 mg) by mouth daily 90 tablet 3     Red Yeast Rice Extract 600 MG CAPS Take 600 mg by mouth daily       Ubiquinol 100 MG CAPS        UNABLE TO FIND daily MEDICATION NAME: GOLO  Release dietary supplement       vitamin D3 (CHOLECALCIFEROL) 2000 units tablet Take 1 tablet by mouth daily       ibuprofen (ADVIL) 200 MG tablet Take 1-2 tablets by mouth as needed       nitroGLYcerin (NITROSTAT) 0.4 MG sublingual tablet For chest pain place 1 tablet under the tongue every 5 minutes for 3 doses. If symptoms persist 5 minutes after 1st dose call 385. 86 tablet 1       ALLERGIES     Allergies   Allergen Reactions     Sulfa Drugs  Anaphylaxis       PAST MEDICAL HISTORY:  Past Medical History:   Diagnosis Date     HTN (hypertension)      Tachycardia        PAST SURGICAL HISTORY:  Past Surgical History:   Procedure Laterality Date     C/SECTION, LOW TRANSVERSE      x2     HERNIA REPAIR         FAMILY HISTORY:  Family History   Problem Relation Age of Onset     Lung Cancer Father      Lung Cancer Brother      Arrhythmia Brother      Arrhythmia Brother      Coronary Artery Disease Brother         MI @55 and      Coronary Artery Disease Brother         MI @55 and  @76     Coronary Artery Disease Brother         MI @54 and        SOCIAL HISTORY:  Social History     Socioeconomic History     Marital status:      Spouse name: None     Number of children: 2     Years of education: None     Highest education level: None   Occupational History     Occupation: [employer]     Employer: [employer]   Social Needs     Financial resource strain: None     Food insecurity     Worry: None     Inability: None     Transportation needs     Medical: None     Non-medical: None   Tobacco Use     Smoking status: Former Smoker     Smokeless tobacco: Never Used     Tobacco comment: quit in    Substance and Sexual Activity     Alcohol use: No     Alcohol/week: 0.0 standard drinks     Drug use: No     Sexual activity: Not Currently     Partners: Male   Lifestyle     Physical activity     Days per week: None     Minutes per session: None     Stress: None   Relationships     Social connections     Talks on phone: None     Gets together: None     Attends Taoist service: None     Active member of club or organization: None     Attends meetings of clubs or organizations: None     Relationship status: None     Intimate partner violence     Fear of current or ex partner: None     Emotionally abused: None     Physically abused: None     Forced sexual activity: None   Other Topics Concern     Parent/sibling w/ CABG, MI or angioplasty before 65F 55M?  Yes   Social History Narrative     None       Review of Systems:  Skin:  Negative       Eyes:  Positive for glasses    ENT:  Negative      Respiratory:  Negative       Cardiovascular:  Negative Positive for;lightheadedness;fatigue    Gastroenterology: Negative      Genitourinary:  Negative      Musculoskeletal:  Positive for arthritis    Neurologic:  Negative      Psychiatric:  Negative      Heme/Lymph/Imm:  Negative      Endocrine:  Negative

## 2021-04-08 ENCOUNTER — HOSPITAL ENCOUNTER (OUTPATIENT)
Dept: CARDIOLOGY | Facility: CLINIC | Age: 71
Discharge: HOME | End: 2021-04-08
Attending: INTERNAL MEDICINE | Admitting: INTERNAL MEDICINE
Payer: COMMERCIAL

## 2021-04-08 DIAGNOSIS — R94.31 ABNORMAL ELECTROCARDIOGRAM: ICD-10-CM

## 2021-04-08 DIAGNOSIS — I47.29 NSVT (NONSUSTAINED VENTRICULAR TACHYCARDIA) (H): ICD-10-CM

## 2021-04-08 PROCEDURE — 93325 DOPPLER ECHO COLOR FLOW MAPG: CPT | Mod: 26 | Performed by: INTERNAL MEDICINE

## 2021-04-08 PROCEDURE — 255N000002 HC RX 255 OP 636: Performed by: INTERNAL MEDICINE

## 2021-04-08 PROCEDURE — 93016 CV STRESS TEST SUPVJ ONLY: CPT | Performed by: INTERNAL MEDICINE

## 2021-04-08 PROCEDURE — 93321 DOPPLER ECHO F-UP/LMTD STD: CPT | Mod: TC

## 2021-04-08 PROCEDURE — 93018 CV STRESS TEST I&R ONLY: CPT | Performed by: INTERNAL MEDICINE

## 2021-04-08 PROCEDURE — 93350 STRESS TTE ONLY: CPT | Mod: 26 | Performed by: INTERNAL MEDICINE

## 2021-04-08 PROCEDURE — 93321 DOPPLER ECHO F-UP/LMTD STD: CPT | Mod: 26 | Performed by: INTERNAL MEDICINE

## 2021-04-08 RX ADMIN — HUMAN ALBUMIN MICROSPHERES AND PERFLUTREN 3 ML: 10; .22 INJECTION, SOLUTION INTRAVENOUS at 13:26

## 2021-04-12 ENCOUNTER — TELEPHONE (OUTPATIENT)
Dept: CARDIOLOGY | Facility: CLINIC | Age: 71
End: 2021-04-12

## 2021-04-12 NOTE — TELEPHONE ENCOUNTER
Called patient and reviewed results and plan to just follow up with cardiology in the future as needed. Patient states understanding and agreement with plan.     ----- Message from Ava Alvarado MD sent at 4/10/2021  5:02 PM CDT -----  Normal test, please call.  F/up with cardiology as needed.  Please update patient.  DI

## 2021-09-15 ENCOUNTER — MEDICAL CORRESPONDENCE (OUTPATIENT)
Dept: HEALTH INFORMATION MANAGEMENT | Facility: CLINIC | Age: 71
End: 2021-09-15
Payer: COMMERCIAL

## 2021-10-09 ENCOUNTER — TRANSFERRED RECORDS (OUTPATIENT)
Dept: HEALTH INFORMATION MANAGEMENT | Facility: CLINIC | Age: 71
End: 2021-10-09
Payer: COMMERCIAL

## 2021-10-24 ENCOUNTER — HEALTH MAINTENANCE LETTER (OUTPATIENT)
Age: 71
End: 2021-10-24

## 2021-11-03 ENCOUNTER — NURSE TRIAGE (OUTPATIENT)
Dept: NURSING | Facility: CLINIC | Age: 71
End: 2021-11-03

## 2021-11-03 ENCOUNTER — VIRTUAL VISIT (OUTPATIENT)
Dept: FAMILY MEDICINE | Facility: CLINIC | Age: 71
End: 2021-11-03
Payer: COMMERCIAL

## 2021-11-03 DIAGNOSIS — I47.10 SUPRAVENTRICULAR TACHYCARDIA (H): Primary | ICD-10-CM

## 2021-11-03 DIAGNOSIS — Z20.822 SUSPECTED 2019 NOVEL CORONAVIRUS INFECTION: ICD-10-CM

## 2021-11-03 PROCEDURE — 99203 OFFICE O/P NEW LOW 30 MIN: CPT | Mod: 95 | Performed by: PREVENTIVE MEDICINE

## 2021-11-03 NOTE — PROGRESS NOTES
"Shavonne is a 71 year old who is being evaluated via a billable telephone visit.      What phone number would you like to be contacted at? In chart  [facility]    Assessment & Plan     Suspected 2019 novel coronavirus infection  -concerning for Covid infection  -unvaccinated+  -has appointment for [facility] test with [facility], does not want to come in sooner for a test at [facility]     Discharge Instructions for COVID-19 Patients  You have--or may have--COVID-19. Please follow the instructions listed below.   If you have a weakened immune system, discuss with your doctor any other actions you need to take.  How can I protect others?  If you have symptoms (fever, cough, body aches or trouble breathing):    Stay home and away from others (self-isolate) until:  ? Your other symptoms have resolved (gotten better). And   ? You've had no fever--and no medicine that reduces fever--for 1 full day (24 hours). And   ? At least 10 days have passed since your symptoms started. (You may need to wait 20 days. Follow the advice of your care team.)  If you don't show symptoms, but testing showed that you have COVID-19:    Stay home and away from others (self-isolate) until at least 10 days have passed since the date of your first positive COVID-19 test.  During this time    Stay in your own room, even for meals. Use your own bathroom if you can.    Stay away from others in your home. No hugging, kissing or shaking hands. No visitors.    Don't go to work, school or anywhere else.    Clean \"high touch\" surfaces often (doorknobs, counters, handles). Use household cleaning spray or wipes.    You'll find a full list of  on the EPA website: www.epa.gov/pesticide-registration/list-n-disinfectants-use-against-sars-cov-2.    Cover your mouth and nose with a mask or other face covering to avoid spreading germs.    Wash your hands and face often. Use soap and water.    Caregivers in these groups are " at risk for severe illness due to COVID-19:  ? People 65 years and older  ? People who live in a nursing home or long-term care facility  ? People with chronic disease (lung, heart, cancer, diabetes, kidney, liver, immunologic)  ? People who have a weakened immune system, including those who:    Are in cancer treatment    Take medicine that weakens the immune system, such as corticosteroids    Had a bone marrow or organ transplant    Have an immune deficiency    Have poorly controlled HIV or AIDS    Are obese (body mass index of 40 or higher)    Smoke regularly    Caregivers should wear gloves while washing dishes, handling laundry and cleaning bedrooms and bathrooms.    Use caution when washing and drying laundry: Don't shake dirty laundry and use the warmest water setting that you can.    For more tips on managing your health at home, go to www.cdc.gov/coronavirus/2019-ncov/downloads/10Things.pdf.  How can I take care of myself at home?  1. Get lots of rest. Drink extra fluids (unless a doctor has told you not to).  2. Take Tylenol (acetaminophen) for fever or pain. If you have liver or kidney problems, ask your family doctor if it's okay to take Tylenol.   Adults can take either:   ? 650 mg (two 325 mg pills) every 4 to 6 hours, or   ? 1,000 mg (two 500 mg pills) every 8 hours as needed.  ? Note: Don't take more than 3,000 mg in one day. Acetaminophen is found in many medicines (both prescribed and over-the-counter medicines). Read all labels to be sure you don't take too much.   For children, check the Tylenol bottle for the right dose. The dose is based on the child's age or weight.  3. If you have other health problems (like cancer, heart failure, an organ transplant or severe kidney disease): Call your specialty clinic if you don't feel better in the next 2 days.  4. Know when to call 911. Emergency warning signs include:  ? Trouble breathing or shortness of breath  ? Pain or pressure in the chest that  "doesn't go away  ? Feeling confused like you haven't felt before, or not being able to wake up  ? Bluish-colored lips or face  5. Your doctor may have prescribed a blood thinner medicine. Follow their instructions.  Where can I get more information?    [facility] - About COVID-19:   [email or web address]    CDC - What to Do If You're Sick: www.cdc.gov/coronavirus/2019-ncov/about/steps-when-sick.html    CDC - Ending Home Isolation: www.cdc.gov/coronavirus/2019-ncov/hcp/disposition-in-home-patients.html    [facility] - Caring for Someone: www.cdc.gov/coronavirus/2019-ncov/if-you-are-sick/care-for-someone.html    [facility] - Interim Guidance for Hospital Discharge to Home: www.health.[facility].[place]./diseases/coronavirus/hcp/hospdischarge.pdf    Below are the COVID-19 hotlines at the [place] Department of Health ([facility]). Interpreters are available.  ? For health questions: Call 071-085-9100 or 1-187.130.5631 (7 a.m. to 7 p.m.)  ? For questions about schools and childcare: Call 536-844-0366 or 1-497.396.2226 (7 a.m. to 7 p.m.)    For informational purposes only. Not to replace the advice of your health care provider. Clinically reviewed by Dr. Maikol Degroot.   Copyright   2020 [facility]. All rights reserved. [employer] [id number] - REV 01/05/21.        Supraventricular tachycardia (H)  -no new chest pain  -followed by Cardiology     15 minutes spent on the date of the encounter doing chart review, history and exam, documentation and further activities per the note       BMI:   Estimated body mass index is 27.28 kg/m  as calculated from the following:    Height as of 3/31/21: 1.6 m (5' 3\").    Weight as of 3/31/21: 69.9 kg (154 lb).       Return in about 5 days (around 11/8/2021) if symptoms worsen or fail to improve.    Genet Zarate MD MPH    [facility] REILLY Marvin is a 71 year old who presents for the following health issues:    HPI     Suspected Covid:  Pt " states she has had fever since [place] 10/31/21  Up to 100.9   Taking ibuprofen and tylenol  Nausea off and on -- no vomiting   Diarrhea x 2 yesterday -- no blood --- no abd pain - no diarrhea today   Drinking fluids- U.O. OK   occ dizzy - no falls no fainting   Slight headache off and on   Not vaccinated for Covid   Fever comes down to 99.2 F. And goes up to 100.7 F  Myalgias+  Cough+  Started with a slight cough+  Started with a tickle in her throat  No rash  No chest pain  No shortness of breath  No wheezing  Daughter may be sick too.   No recent travel, except for [place] for some tournaments  [facility]       Review of Systems   Constitutional, HEENT, cardiovascular, pulmonary, gi and gu systems are negative, except as otherwise noted.      Objective           Vitals:  No vitals were obtained today due to virtual visit.    Physical Exam   alert  PSYCH: Alert and oriented times 3; coherent speech, normal   rate and volume, able to articulate logical thoughts, able   to abstract reason, no tangential thoughts, no hallucinations   or delusions  Her affect is normal  RESP: some cough+, no audible wheezing, able to talk in full sentences  Remainder of exam unable to be completed due to telephone visits            Phone call duration: 6 minutes

## 2021-11-03 NOTE — TELEPHONE ENCOUNTER
RN triage   Call from pt   Pt states she has had fever since Sunday   Up to 100.9---   No T = 100.7  Taking ibuprofen and tylenol  Nausea off and on -- no vomiting   Diarrhea x 2 yesterday -- no blood --- no abd pain - no diarrhea today   Drinking fluids- U.O. OK   occ dizzy - no falls no fainting   Sl headache off and on     Reviewed home care advice - isolation advice - and when to call back - be seen urgently   Transferred to      Britt De Leon RN  [facility]  Triage Nurse Advisor      COVID 19 Nurse Triage Plan/Patient Instructions    Please be aware that novel coronavirus (COVID-19) may be circulating in the community. If you develop symptoms such as fever, cough, or SOB or if you have concerns about the presence of another infection including coronavirus (COVID-19), please contact your health care provider or visit [email or web address].     Disposition/Instructions    Virtual Visit with provider recommended. Reference Visit Selection Guide.    Thank you for taking steps to prevent the spread of this virus.  o Limit your contact with others.  o Wear a simple mask to cover your cough.  o Wash your hands well and often.    Resources    [facility]: About COVID-19: [email or web address]    CDC: What to Do If You're Sick: www.cdc.gov/coronavirus/2019-ncov/about/steps-when-sick.html    CDC: Ending Home Isolation: www.cdc.gov/coronavirus/2019-ncov/hcp/disposition-in-home-patients.html     CDC: Caring for Someone: www.cdc.gov/coronavirus/2019-ncov/if-you-are-sick/care-for-someone.html     [facility]: Interim Guidance for Hospital Discharge to Home: www.health.[facility]/diseases/coronavirus/hcp/hospdischarge.pdf    [facility] clinical trials (COVID-19 research studies): clinicalaffairs.[facility]/umn-clinical-trials     Below are the COVID-19 hotlines at the [place] Department of Health ([facility]). Interpreters are available.   o For health questions: Call 350-570-1834 or 1-955.777.2754 (3  a.m. to 7 p.m.)  o For questions about schools and childcare: Call 721-649-4906 or 1-394.682.6489 (7 a.m. to 7 p.m.)                     Reason for Disposition    HIGH RISK for severe COVID complications (e.g., age > 64 years, obesity with BMI > 25, pregnant, chronic lung disease or other chronic medical condition)  (Exception: Already seen by PCP and no new or worsening symptoms.)    Additional Information    Negative: SEVERE difficulty breathing (e.g., struggling for each breath, speaks in single words)    Negative: Difficult to awaken or acting confused (e.g., disoriented, slurred speech)    Negative: Bluish (or gray) lips or face now    Negative: Shock suspected (e.g., cold/pale/clammy skin, too weak to stand, low BP, rapid pulse)    Negative: Sounds like a life-threatening emergency to the triager    Negative: SEVERE or constant chest pain or pressure (Exception: mild central chest pain, present only when coughing)    Negative: MODERATE difficulty breathing (e.g., speaks in phrases, SOB even at rest, pulse 100-120)    Negative: [1] Headache AND [2] stiff neck (can't touch chin to chest)    Negative: MILD difficulty breathing (e.g., minimal/no SOB at rest, SOB with walking, pulse <100)    Negative: Chest pain or pressure    Negative: Patient sounds very sick or weak to the triager    Negative: [1] Fever > 101 F (38.3 C) AND [2] age > 60 years    Protocols used: CORONAVIRUS (COVID-19) DIAGNOSED OR [id number] 8.25.2021

## 2021-11-05 ENCOUNTER — NURSE TRIAGE (OUTPATIENT)
Dept: NURSING | Facility: CLINIC | Age: 71
End: 2021-11-05

## 2021-11-06 NOTE — TELEPHONE ENCOUNTER
Pt called in states she has high BP.  117/83 P 88  133/70 P 81  Temp 100.8 oral now.  Pt took Advil couple hours ago.  The Pt tested for covid-19 today result is not ready yet.  Has cough mild.  No shortness of breathing.  The Pt get dizzy when she get.  There is not muscle pain  The Pt able to stand walk.  No history lung disease.  Pt has tachycardia.  Has chills, no headache.  No sore throat.  The disposition is to call PCP.  On call provider was dinesh.   Stephen Morris states Pt can stay at home and watch for the sever symptoms. Drink fluid control fever.  If the sever symptom occur Pt has to go to ED.  Called for the Pt relayed provider advised.  Pt verbalized understand, no other concern at this time.        Yves [place] Nurse Advisor 11/5/2021 8:07 PM        Reason for Disposition    HIGH RISK for severe COVID complications (e.g., age > 64 years, obesity with BMI > 25, pregnant, chronic lung disease or other chronic medical condition)  (Exception: Already seen by PCP and no new or worsening symptoms.)    Additional Information    Negative: SEVERE difficulty breathing (e.g., struggling for each breath, speaks in single words)    Negative: Difficult to awaken or acting confused (e.g., disoriented, slurred speech)    Negative: Bluish (or gray) lips or face now    Negative: Shock suspected (e.g., cold/pale/clammy skin, too weak to stand, low BP, rapid pulse)    Negative: Sounds like a life-threatening emergency to the triager    Negative: [1] COVID-19 exposure AND [2] no symptoms    Negative: COVID-19 vaccine reaction suspected (e.g., fever, headache, muscle aches) occurring 1 to 3 days after getting vaccine    Negative: COVID-19 vaccine, questions about    Negative: [1] Lives with someone known to have influenza (flu test positive) AND [2] flu-like symptoms (e.g., cough, runny nose, sore throat, SOB; with or without fever)    Negative: [1] Adult with possible COVID-19 symptoms AND [2] triager  concerned about severity of symptoms or other causes    Negative: COVID-19 and breastfeeding, questions about    Negative: SEVERE or constant chest pain or pressure (Exception: mild central chest pain, present only when coughing)    Negative: MODERATE difficulty breathing (e.g., speaks in phrases, SOB even at rest, pulse 100-120)    Negative: [1] Headache AND [2] stiff neck (can't touch chin to chest)    Negative: MILD difficulty breathing (e.g., minimal/no SOB at rest, SOB with walking, pulse <100)    Negative: Chest pain or pressure    Negative: Patient sounds very sick or weak to the triager    Negative: Fever > 103 F (39.4 C)    Negative: [1] Fever > 101 F (38.3 C) AND [2] age > 60 years    Negative: [1] Fever > 100.0 F (37.8 C) AND [2] bedridden (e.g., nursing home patient, CVA, chronic illness, recovering from surgery)    Protocols used: CORONAVIRUS (COVID-19) DIAGNOSED OR [id number] 8.25.2021

## 2021-11-07 ENCOUNTER — APPOINTMENT (OUTPATIENT)
Dept: GENERAL RADIOLOGY | Facility: CLINIC | Age: 71
DRG: 177 | End: 2021-11-07
Attending: EMERGENCY MEDICINE
Payer: COMMERCIAL

## 2021-11-07 ENCOUNTER — HOSPITAL ENCOUNTER (OUTPATIENT)
Facility: CLINIC | Age: 71
Setting detail: OBSERVATION
Discharge: HOME | DRG: 177 | End: 2021-11-08
Attending: EMERGENCY MEDICINE | Admitting: STUDENT IN AN ORGANIZED HEALTH CARE EDUCATION/TRAINING PROGRAM
Payer: COMMERCIAL

## 2021-11-07 ENCOUNTER — NURSE TRIAGE (OUTPATIENT)
Dept: NURSING | Facility: CLINIC | Age: 71
End: 2021-11-07
Payer: COMMERCIAL

## 2021-11-07 DIAGNOSIS — J96.01 ACUTE RESPIRATORY FAILURE WITH HYPOXIA (H): ICD-10-CM

## 2021-11-07 DIAGNOSIS — J12.82 PNEUMONIA DUE TO 2019 NOVEL CORONAVIRUS: ICD-10-CM

## 2021-11-07 DIAGNOSIS — U07.1 PNEUMONIA DUE TO 2019 NOVEL CORONAVIRUS: ICD-10-CM

## 2021-11-07 LAB
ALBUMIN SERPL-MCNC: 2.8 G/DL (ref 3.4–5)
ALBUMIN UR-MCNC: NEGATIVE MG/DL
ALP SERPL-CCNC: 72 U/L (ref 40–150)
ALT SERPL W P-5'-P-CCNC: 25 U/L (ref 0–50)
ANION GAP SERPL CALCULATED.3IONS-SCNC: 6 MMOL/L (ref 3–14)
APPEARANCE UR: CLEAR
AST SERPL W P-5'-P-CCNC: 24 U/L (ref 0–45)
BASOPHILS # BLD AUTO: 0 10E3/UL (ref 0–0.2)
BASOPHILS NFR BLD AUTO: 1 %
BILIRUB SERPL-MCNC: 0.4 MG/DL (ref 0.2–1.3)
BILIRUB UR QL STRIP: NEGATIVE
BUN SERPL-MCNC: 6 MG/DL (ref 7–30)
CALCIUM SERPL-MCNC: 7.8 MG/DL (ref 8.5–10.1)
CHLORIDE BLD-SCNC: 96 MMOL/L (ref 94–109)
CO2 SERPL-SCNC: 31 MMOL/L (ref 20–32)
COLOR UR AUTO: NORMAL
CREAT SERPL-MCNC: 0.38 MG/DL (ref 0.52–1.04)
CREAT SERPL-MCNC: 0.46 MG/DL (ref 0.52–1.04)
D DIMER PPP FEU-MCNC: 0.44 UG/ML FEU (ref 0–0.5)
EOSINOPHIL # BLD AUTO: 0 10E3/UL (ref 0–0.7)
EOSINOPHIL NFR BLD AUTO: 0 %
ERYTHROCYTE [DISTWIDTH] IN BLOOD BY AUTOMATED COUNT: 11.9 % (ref 10–15)
GFR SERPL CREATININE-BSD FRML MDRD: >90 ML/MIN/1.73M2
GFR SERPL CREATININE-BSD FRML MDRD: >90 ML/MIN/1.73M2
GLUCOSE BLD-MCNC: 99 MG/DL (ref 70–99)
GLUCOSE UR STRIP-MCNC: NEGATIVE MG/DL
HCT VFR BLD AUTO: 39.4 % (ref 35–47)
HGB BLD-MCNC: 12.6 G/DL (ref 11.7–15.7)
HGB UR QL STRIP: NEGATIVE
IMM GRANULOCYTES # BLD: 0 10E3/UL
IMM GRANULOCYTES NFR BLD: 1 %
KETONES UR STRIP-MCNC: NEGATIVE MG/DL
LEUKOCYTE ESTERASE UR QL STRIP: NEGATIVE
LIPASE SERPL-CCNC: 92 U/L (ref 73–393)
LYMPHOCYTES # BLD AUTO: 0.3 10E3/UL (ref 0.8–5.3)
LYMPHOCYTES NFR BLD AUTO: 14 %
MCH RBC QN AUTO: 30 PG (ref 26.5–33)
MCHC RBC AUTO-ENTMCNC: 32 G/DL (ref 31.5–36.5)
MCV RBC AUTO: 94 FL (ref 78–100)
MONOCYTES # BLD AUTO: 0.2 10E3/UL (ref 0–1.3)
MONOCYTES NFR BLD AUTO: 11 %
NEUTROPHILS # BLD AUTO: 1.6 10E3/UL (ref 1.6–8.3)
NEUTROPHILS NFR BLD AUTO: 73 %
NITRATE UR QL: NEGATIVE
NRBC # BLD AUTO: 0 10E3/UL
NRBC BLD AUTO-RTO: 0 /100
PH UR STRIP: 6.5 [PH] (ref 5–7)
PLATELET # BLD AUTO: 165 10E3/UL (ref 150–450)
POTASSIUM BLD-SCNC: 3.3 MMOL/L (ref 3.4–5.3)
POTASSIUM BLD-SCNC: 4.1 MMOL/L (ref 3.4–5.3)
PROT SERPL-MCNC: 6.4 G/DL (ref 6.8–8.8)
RBC # BLD AUTO: 4.2 10E6/UL (ref 3.8–5.2)
RBC URINE: 0 /HPF
SODIUM SERPL-SCNC: 133 MMOL/L (ref 133–144)
SP GR UR STRIP: 1 (ref 1–1.03)
TROPONIN I SERPL-MCNC: <0.015 UG/L (ref 0–0.04)
UROBILINOGEN UR STRIP-MCNC: NORMAL MG/DL
WBC # BLD AUTO: 2.2 10E3/UL (ref 4–11)
WBC URINE: <1 /HPF

## 2021-11-07 PROCEDURE — G0378 HOSPITAL OBSERVATION PER HR: HCPCS

## 2021-11-07 PROCEDURE — 96374 THER/PROPH/DIAG INJ IV PUSH: CPT

## 2021-11-07 PROCEDURE — 36415 COLL VENOUS BLD VENIPUNCTURE: CPT | Performed by: EMERGENCY MEDICINE

## 2021-11-07 PROCEDURE — 84484 ASSAY OF TROPONIN QUANT: CPT | Performed by: EMERGENCY MEDICINE

## 2021-11-07 PROCEDURE — 83690 ASSAY OF LIPASE: CPT | Performed by: EMERGENCY MEDICINE

## 2021-11-07 PROCEDURE — 250N000009 HC RX 250: Performed by: EMERGENCY MEDICINE

## 2021-11-07 PROCEDURE — 250N000011 HC RX IP 250 OP 636: Performed by: EMERGENCY MEDICINE

## 2021-11-07 PROCEDURE — 250N000011 HC RX IP 250 OP 636: Performed by: STUDENT IN AN ORGANIZED HEALTH CARE EDUCATION/TRAINING PROGRAM

## 2021-11-07 PROCEDURE — 96361 HYDRATE IV INFUSION ADD-ON: CPT

## 2021-11-07 PROCEDURE — 99220 PR INITIAL OBSERVATION CARE,LEVEL III: CPT | Performed by: STUDENT IN AN ORGANIZED HEALTH CARE EDUCATION/TRAINING PROGRAM

## 2021-11-07 PROCEDURE — 99285 EMERGENCY DEPT VISIT HI MDM: CPT | Mod: 25

## 2021-11-07 PROCEDURE — 250N000013 HC RX MED GY IP 250 OP 250 PS 637: Performed by: STUDENT IN AN ORGANIZED HEALTH CARE EDUCATION/TRAINING PROGRAM

## 2021-11-07 PROCEDURE — 84132 ASSAY OF SERUM POTASSIUM: CPT | Performed by: STUDENT IN AN ORGANIZED HEALTH CARE EDUCATION/TRAINING PROGRAM

## 2021-11-07 PROCEDURE — 258N000003 HC RX IP 258 OP 636: Performed by: EMERGENCY MEDICINE

## 2021-11-07 PROCEDURE — 85379 FIBRIN DEGRADATION QUANT: CPT | Performed by: EMERGENCY MEDICINE

## 2021-11-07 PROCEDURE — 96372 THER/PROPH/DIAG INJ SC/IM: CPT | Mod: XS | Performed by: STUDENT IN AN ORGANIZED HEALTH CARE EDUCATION/TRAINING PROGRAM

## 2021-11-07 PROCEDURE — 93005 ELECTROCARDIOGRAM TRACING: CPT

## 2021-11-07 PROCEDURE — 80053 COMPREHEN METABOLIC PANEL: CPT | Performed by: EMERGENCY MEDICINE

## 2021-11-07 PROCEDURE — 85025 COMPLETE CBC W/AUTO DIFF WBC: CPT | Performed by: EMERGENCY MEDICINE

## 2021-11-07 PROCEDURE — 250N000013 HC RX MED GY IP 250 OP 250 PS 637: Performed by: EMERGENCY MEDICINE

## 2021-11-07 PROCEDURE — 71045 X-RAY EXAM CHEST 1 VIEW: CPT

## 2021-11-07 PROCEDURE — 82565 ASSAY OF CREATININE: CPT | Performed by: STUDENT IN AN ORGANIZED HEALTH CARE EDUCATION/TRAINING PROGRAM

## 2021-11-07 PROCEDURE — 36415 COLL VENOUS BLD VENIPUNCTURE: CPT | Performed by: STUDENT IN AN ORGANIZED HEALTH CARE EDUCATION/TRAINING PROGRAM

## 2021-11-07 PROCEDURE — 81001 URINALYSIS AUTO W/SCOPE: CPT | Performed by: EMERGENCY MEDICINE

## 2021-11-07 RX ORDER — VITAMIN B COMPLEX
50 TABLET ORAL DAILY
Status: DISCONTINUED | OUTPATIENT
Start: 2021-11-08 | End: 2021-11-08 | Stop reason: HOSPADM

## 2021-11-07 RX ORDER — ASPIRIN 81 MG/1
81 TABLET ORAL DAILY
Status: DISCONTINUED | OUTPATIENT
Start: 2021-11-08 | End: 2021-11-08 | Stop reason: HOSPADM

## 2021-11-07 RX ORDER — DEXAMETHASONE SODIUM PHOSPHATE 10 MG/ML
6 INJECTION, SOLUTION INTRAMUSCULAR; INTRAVENOUS ONCE
Status: DISCONTINUED | OUTPATIENT
Start: 2021-11-07 | End: 2021-11-07

## 2021-11-07 RX ORDER — POTASSIUM CHLORIDE 1500 MG/1
40 TABLET, EXTENDED RELEASE ORAL ONCE
Status: COMPLETED | OUTPATIENT
Start: 2021-11-07 | End: 2021-11-07

## 2021-11-07 RX ORDER — ONDANSETRON 2 MG/ML
4 INJECTION INTRAMUSCULAR; INTRAVENOUS EVERY 6 HOURS PRN
Status: DISCONTINUED | OUTPATIENT
Start: 2021-11-07 | End: 2021-11-08 | Stop reason: HOSPADM

## 2021-11-07 RX ORDER — AMOXICILLIN 250 MG
1 CAPSULE ORAL 2 TIMES DAILY PRN
Status: DISCONTINUED | OUTPATIENT
Start: 2021-11-07 | End: 2021-11-08 | Stop reason: HOSPADM

## 2021-11-07 RX ORDER — POTASSIUM CHLORIDE 1500 MG/1
20 TABLET, EXTENDED RELEASE ORAL ONCE
Status: DISCONTINUED | OUTPATIENT
Start: 2021-11-07 | End: 2021-11-08 | Stop reason: HOSPADM

## 2021-11-07 RX ORDER — ONDANSETRON 2 MG/ML
4 INJECTION INTRAMUSCULAR; INTRAVENOUS ONCE
Status: COMPLETED | OUTPATIENT
Start: 2021-11-07 | End: 2021-11-07

## 2021-11-07 RX ORDER — DILTIAZEM HYDROCHLORIDE 240 MG/1
240 CAPSULE, COATED, EXTENDED RELEASE ORAL EVERY EVENING
Status: DISCONTINUED | OUTPATIENT
Start: 2021-11-07 | End: 2021-11-08 | Stop reason: HOSPADM

## 2021-11-07 RX ORDER — AMOXICILLIN 250 MG
2 CAPSULE ORAL 2 TIMES DAILY PRN
Status: DISCONTINUED | OUTPATIENT
Start: 2021-11-07 | End: 2021-11-08 | Stop reason: HOSPADM

## 2021-11-07 RX ORDER — IRBESARTAN 75 MG/1
150 TABLET ORAL DAILY
Status: DISCONTINUED | OUTPATIENT
Start: 2021-11-08 | End: 2021-11-08 | Stop reason: HOSPADM

## 2021-11-07 RX ORDER — ONDANSETRON 4 MG/1
4 TABLET, ORALLY DISINTEGRATING ORAL EVERY 6 HOURS PRN
Status: DISCONTINUED | OUTPATIENT
Start: 2021-11-07 | End: 2021-11-08 | Stop reason: HOSPADM

## 2021-11-07 RX ADMIN — DILTIAZEM HYDROCHLORIDE 240 MG: 240 CAPSULE, COATED, EXTENDED RELEASE ORAL at 20:47

## 2021-11-07 RX ADMIN — SODIUM CHLORIDE 1000 ML: 9 INJECTION, SOLUTION INTRAVENOUS at 12:11

## 2021-11-07 RX ADMIN — POTASSIUM CHLORIDE 40 MEQ: 1500 TABLET, EXTENDED RELEASE ORAL at 17:09

## 2021-11-07 RX ADMIN — ENOXAPARIN SODIUM 40 MG: 40 INJECTION SUBCUTANEOUS at 17:06

## 2021-11-07 RX ADMIN — ONDANSETRON 4 MG: 2 INJECTION INTRAMUSCULAR; INTRAVENOUS at 12:11

## 2021-11-07 RX ADMIN — LIDOCAINE HYDROCHLORIDE 30 ML: 20 SOLUTION ORAL; TOPICAL at 12:11

## 2021-11-07 ASSESSMENT — ENCOUNTER SYMPTOMS
VOMITING: 1
COUGH: 1
SHORTNESS OF BREATH: 0
ABDOMINAL PAIN: 0
BLOOD IN STOOL: 1
CHILLS: 1
DYSURIA: 0
FEVER: 0
DIARRHEA: 1
CONFUSION: 0
NAUSEA: 1

## 2021-11-07 ASSESSMENT — MIFFLIN-ST. JEOR
SCORE: 1178.13
SCORE: 1173.6

## 2021-11-07 ASSESSMENT — ACTIVITIES OF DAILY LIVING (ADL)
ADLS_ACUITY_SCORE: 12

## 2021-11-07 NOTE — ED NOTES
Pt's pulse ox was 88-89%. RN went to assess patient, who was sleeping. Upon awakening and talking, oxygen saturation improved to 96%. MD notified. Will get ambulatory pulse ox. Pt states she has been tested for sleep apnea, but never finished the test because she didn't sleep long enough.

## 2021-11-07 NOTE — ED NOTES
"RECEIVING UNIT ED HANDOFF REVIEW    Above ED Nurse Handoff Report was reviewed: Yes  Reviewed by: Felipa Delaney RN on November 7, 2021 at 4:07 PM   [facility]  ED Nurse Handoff Report    Shavonne Lundberg is a 71 year old female   ED Chief complaint: Covid Concern and Nausea & Vomiting  . ED Diagnosis:   Final diagnoses:   Acute respiratory failure with hypoxia (H)     Allergies:   Allergies   Allergen Reactions     Sulfa Drugs Anaphylaxis       Code Status: Full Code  Activity level - Baseline/Home:  Independent. Activity Level - Current:   Assist X 1. Lift room needed: No. Bariatric: No   Needed: No   Isolation: Yes. Infection: COVID+    Vital Signs:   Vitals:    11/07/21 1318 11/07/21 1330 11/07/21 1345 11/07/21 1400   BP:  (!) 140/102 (!) 144/69 (!) 159/77   Pulse: 86 102 81 81   Resp: 14   20   Temp:       TempSrc:       SpO2: (!) 88% 91% 97% 97%   Weight:       Height:           Cardiac Rhythm:  ,   Cardiac  Cardiac Rhythm: Normal sinus rhythm (frequent PVC's)  Pain level:    Patient confused: No. Patient Falls Risk: Yes.   Elimination Status: Has voided   Patient Report - Initial Complaint: COVID+, nausea/vomiting. Focused Assessment: 71 year old female with history of hypertension who presents with Covid-19 and Vomiting. Patient tested positive for Covid-19 at a [facility] in [place] yesterday after feeling sick for approximately one week with nausea and diarrhea. The past two mornings, the patient has awoken with left arm pain and heartburn that resolves as the day continues.      Today the patient called for EMS with continuation of her Covid-19 symptoms. EMS reports a blood pressure in the 200s that improved to 160s. Four milligrams of IV Zofran given en route as well. Here at the ED the patient is asking for the \"red pill\" that Pfizer produced for the treatment of Covid-19 symptoms.   **Pt placed on 2L O2 because saturations would drop to 87%, mostly with sleeping. " Oxygen saturations now 95% on 2L.   Review of Systems   Gastrointestinal: Positive for diarrhea, nausea and vomiting.   All other systems reviewed and are negative.      Tests Performed: Labs, EKG, urine, xray. Abnormal Results:   Labs Ordered and Resulted from Time of ED Arrival to Time of ED Departure   COMPREHENSIVE METABOLIC PANEL - Abnormal       Result Value    Sodium 133      Potassium 3.3 (*)     Chloride 96      Carbon Dioxide (CO2) 31      Anion Gap 6      Urea Nitrogen 6 (*)     Creatinine 0.38 (*)     Calcium 7.8 (*)     Glucose 99      Alkaline Phosphatase 72      AST 24      ALT 25      Protein Total 6.4 (*)     Albumin 2.8 (*)     Bilirubin Total 0.4      GFR Estimate >90     CBC WITH PLATELETS AND DIFFERENTIAL - Abnormal    WBC Count 2.2 (*)     RBC Count 4.20      Hemoglobin 12.6      Hematocrit 39.4      MCV 94      MCH 30.0      MCHC 32.0      RDW 11.9      Platelet Count 165      % Neutrophils 73      % Lymphocytes 14      % Monocytes 11      % Eosinophils 0      % Basophils 1      % Immature Granulocytes 1      NRBCs per 100 WBC 0      Absolute Neutrophils 1.6      Absolute Lymphocytes 0.3 (*)     Absolute Monocytes 0.2      Absolute Eosinophils 0.0      Absolute Basophils 0.0      Absolute Immature Granulocytes 0.0      Absolute NRBCs 0.0     TROPONIN I - Normal    Troponin I <0.015     ROUTINE UA WITH MICROSCOPIC REFLEX TO CULTURE - Normal    Color Urine Straw      Appearance Urine Clear      Glucose Urine Negative      Bilirubin Urine Negative      Ketones Urine Negative      Specific Gravity Urine 1.004      Blood Urine Negative      pH Urine 6.5      Protein Albumin Urine Negative      Urobilinogen Urine Normal      Nitrite Urine Negative      Leukocyte Esterase Urine Negative      RBC Urine 0      WBC Urine <1     D DIMER QUANTITATIVE - Normal    D-Dimer Quantitative 0.44     LIPASE - Normal    Lipase 92       XR Chest Port 1 View   Preliminary Result   IMPRESSION: Patchy bibasilar  opacities may be atypical pneumonia. Normal cardiac silhouette. No effusion or pneumothorax.        .   Treatments provided: See MAR  Family Comments: No family here.   OBS brochure/video discussed/provided to patient:  N/A  ED Medications:   Medications   dexamethasone PF (DECADRON) injection 6 mg (6 mg Intravenous Not Given 11/7/21 1407)   lidocaine (XYLOCAINE) 2 % 15 mL, alum & mag hydroxide-simethicone (MAALOX) 15 mL GI Cocktail (30 mLs Oral Given 11/7/21 1211)   0.9% sodium chloride BOLUS (0 mLs Intravenous Stopped 11/7/21 1308)   ondansetron (ZOFRAN) injection 4 mg (4 mg Intravenous Given 11/7/21 1211)     Drips infusing:  No  For the majority of the shift, the patient's behavior Green. Interventions performed were N/A.    Sepsis treatment initiated: No     Patient tested for COVID 19 prior to admission: NO - pt tested positive yesterday at [facility] in [place].     ED Nurse Name/Phone Number: Paula Bhatti RN,   2:27 PM

## 2021-11-07 NOTE — H&P
"[facility]  Hospitalist Admission Note  Name: Shavonne Lundberg    MRN: [id number]  YOB: 1950    Age: 71 year old  Date of admission: 11/7/2021  Primary care provider: Juanis Davison    Chief Complaint:  Nausea/vomiting, left arm pain    Assessment and Plan:   COVID-19 pneumonia, moderate  Acute respiratory insufficiency:  Patient presents with nausea, abdominal pain, left arm pain.  She had a positive Covid test on 11/6 at an outside clinic after feeling poorly for the past week.  She presented given the new symptoms of left arm pain with concern for cardiac equivalent.  X-ray is notable for patchy bibasilar opacities consistent with an atypical pneumonia.  I wonder if all her symptoms are related to the Covid.  She is not requiring any supplemental oxygen with rest or activity.  It is interesting that she desaturates when sleeping, but I wonder if this is just related to undiagnosed sleep apnea.  She reports that she was undergoing work-up for this but was unable to complete a sleep study in the past as the machine kept waking her up in the middle of the procedure.  -Continuous oximetry, supplemental oxygen as needed  -Incentive spirometry  -We will hold on giving the patient Decadron and remdesivir at this time as the patient reports severe adverse effects to prednisone that include severe tachycardia, \"climbing the walls\" and terrible insomnia  -Enoxaparin for anticoagulation  -Admit to obs status  -Full code  -Airborne precautions    Hypertension:  Patient has been hypertensive throughout her ED stay. Mostly 150-160s. She reports a history of HTN which she takes irbesartan 150 mg daily.  She reports that she is been taking extra half tablet daily.  -Continue PTA irbesartan 150 mg daily    Tachycardia:  Patient unable to report the etiology of her tachycardia.  She reports that she is been taking diltiazem for many many years now.  -Continue PTA diltiazem    Suspect " undiagnosed obstructive sleep apnea:  Patient desaturates only while sleeping.  I suspect this is related to undiagnosed sleep apnea as the patient has been worked up for this in the past.  Unfortunately she was unable to tolerate formal sleep study as an outpatient.    DVT Prophylaxis: Enoxaparin (Lovenox) SQ  Code Status: Full Code  Discharge Dispo: Home  Estimated Disch Date / # of Days until Disch: Anticipate discharge home tomorrow 11/8 if no longer requiring supplemental oxygen.      History of Present Illness:  Shavonne Lundberg is a 71 year old female with PMH including tachycardia on diltiazem, hyperlipidemia, hypertension who presents with nausea/vomiting, left arm pain.    Patient reports that she tested positive for Covid yesterday.  She reports that she is been sick for approximately 1 week.  Her primary symptoms are nausea and diarrhea.  For the last 1 to 2 days she is woken up with left arm pain and heartburn that slowly improves throughout the morning.  She notes that because of the symptoms she called the telemetry nurse who recommended that she present to the emergency department for further evaluation with concern for possible cardiac symptoms.  The patient otherwise denies symptoms.  She denies any chest pain or shortness of breath.  She does admit to cough which is dry.  No fevers.    In the ED the patient was found to be very hypertensive on presentation to 200 systolic.  Her labs are mostly notable for mild hypokalemia 3.3.  Leukopenia to 2.2.  Chest x-ray is notable for a possible atypical pneumonia notable for bibasilar opacities.         Past Medical History:  Past Medical History:   Diagnosis Date     HTN (hypertension)      Tachycardia      Past Surgical History:  Past Surgical History:   Procedure Laterality Date     C/SECTION, LOW TRANSVERSE      x2     HERNIA REPAIR       Social History:  Social History     Tobacco Use     Smoking status: Former Smoker     Smokeless tobacco: Never  Used     Tobacco comment: quit in    Substance Use Topics     Alcohol use: No     Alcohol/week: 0.0 standard drinks     Social History     Social History Narrative     Not on file     Family History:  Family History   Problem Relation Age of Onset     Lung Cancer Father      Lung Cancer Brother      Arrhythmia Brother      Arrhythmia Brother      Coronary Artery Disease Brother         MI @55 and      Coronary Artery Disease Brother         MI @55 and  @76     Coronary Artery Disease Brother         MI @54 and      Allergies:  Allergies   Allergen Reactions     Sulfa Drugs Anaphylaxis     Medications:  No current facility-administered medications on file prior to encounter.  Ascorbic Acid (VITAMIN C PO), Take 1,635 mg by mouth daily  aspirin 81 MG EC tablet, Take 81 mg by mouth daily  co-enzyme Q-10 100 MG CAPS capsule, Take 100 mg by mouth daily  Coenzyme Q10-Fish Oil-Vit E (CO-Q 10 OMEGA-3 FISH OIL PO),   diltiazem ER COATED BEADS (CARDIZEM CD) 240 MG 24 hr capsule, Take 1 capsule (240 mg) by mouth daily  Fish Oil-Cholecalciferol (FISH OIL + D3) 4529-3634 MG-UNIT CAPS, Take 2 capsules by mouth daily  Garlic 1000 MG CAPS,   ibuprofen (ADVIL) 200 MG tablet, Take 1-2 tablets by mouth as needed  irbesartan (AVAPRO) 150 MG tablet, Take 1 tablet (150 mg) by mouth daily  nitroGLYcerin (NITROSTAT) 0.4 MG sublingual tablet, For chest pain place 1 tablet under the tongue every 5 minutes for 3 doses. If symptoms persist 5 minutes after 1st dose call 911.  Red Yeast Rice Extract 600 MG CAPS, Take 600 mg by mouth daily  Ubiquinol 100 MG CAPS,   UNABLE TO FIND, daily MEDICATION NAME: GOLO  Release dietary supplement  vitamin D3 (CHOLECALCIFEROL) 2000 units tablet, Take 1 tablet by mouth daily      Review of Systems:  A Comprehensive greater than 10 system review of systems was carried out.  Pertinent positives and negatives are noted above.  Otherwise negative for contributory information.     Physical  "Exam:  Blood pressure (!) 144/69, pulse 81, temperature 99.8  F (37.7  C), temperature source Oral, resp. rate 14, height 1.6 m (5' 3\"), weight 68.9 kg (152 lb), SpO2 97 %, not currently breastfeeding.  Wt Readings from Last 1 Encounters:   11/07/21 68.9 kg (152 lb)     Exam:  General: Alert, awake, no acute distress.  HEENT: NC/AT, eyes anicteric, external occular movements intact, face symmetric.  Dentition WNL, MM moist.  Cardiac: RRR, S1, S2.  No murmurs appreciated.  Pulmonary: Normal chest rise, normal work of breathing.  Mild rales in the bilateral bases.   Abdomen: soft, non-tender, non-distended.  Bowel Sounds Present.  No guarding.  Extremities: no deformities.  Warm, well perfused.  Skin: no rashes or lesions noted.  Warm and Dry.  Neuro: No focal deficits noted.  Speech clear.  Coordination and strength grossly normal.  Psych: Appropriate affect.    Data:  EKG:  NSR  Imaging:  Recent Results (from the past 24 hour(s))   XR Chest Port 1 View    Narrative    EXAM: XR CHEST PORT 1 VIEW  LOCATION: [facility]  DATE/TIME: 11/7/2021 12:53 PM    INDICATION: COVID, chest pain.  COMPARISON: None.      Impression    IMPRESSION: Patchy bibasilar opacities may be atypical pneumonia. Normal cardiac silhouette. No effusion or pneumothorax.     Labs:  Recent Labs   Lab 11/07/21  1212   WBC 2.2*   HGB 12.6   HCT 39.4   MCV 94             Lab Results   Component Value Date     11/07/2021     01/07/2021     01/06/2020     12/27/2018    Lab Results   Component Value Date    CHLORIDE 96 11/07/2021    CHLORIDE 105 01/07/2021    CHLORIDE 105 01/06/2020    CHLORIDE 101 12/27/2018    Lab Results   Component Value Date    BUN 6 11/07/2021    BUN 10 01/07/2021    BUN 11 01/06/2020    BUN 10 12/27/2018      Lab Results   Component Value Date    POTASSIUM 3.3 11/07/2021    POTASSIUM 4.0 01/07/2021    POTASSIUM 4.0 01/06/2020    POTASSIUM 4.2 12/27/2018    Lab Results   Component " Value Date    CO2 31 11/07/2021    CO2 29 01/07/2021    CO2 28 01/06/2020    CO2 30 12/27/2018    Lab Results   Component Value Date    CR 0.38 11/07/2021    CR 0.48 01/07/2021    CR 0.45 01/06/2020    CR 0.53 12/27/2018            Iván Smith DO  Hospitalist  [facility]

## 2021-11-07 NOTE — ED NOTES
Patient ambulated in room 2 independently with a pulse ox. At rest her Spo2 was 94% and heart rate was 92 bpm. While she was ambulating her spo2 was 91%-90%. She said that she was feeling nauseated and weak. Provider notified

## 2021-11-07 NOTE — TELEPHONE ENCOUNTER
Patient has tested positive for Covid yesterday. Patient asking if we are prescribing the new red pill offered by Pfizer for Covd.     Patient states that she is having blood pressure issues, heartburn, nausea, and constant belching. Yesterday had pain in her shoulder and running down her left arm. Blood pressures have been high for her. Strong family history of cardiac death. Patient also reports on and off dizziness.     Protocol recommends ED. Patient does not have another adult to drive as family is sick with covid.   Directed to call 911 for EMS to transport versus driving herself.  Patient agrees to call 911.  Neida Mcdaniel RN   11/07/21 10:11 AM  [facility] Nurse Advisor    COVID 19 Nurse Triage Plan/Patient Instructions    Please be aware that novel coronavirus (COVID-19) may be circulating in the community. If you develop symptoms such as fever, cough, or SOB or if you have concerns about the presence of another infection including coronavirus (COVID-19), please contact your health care provider or visit [email or web address].     Disposition/Instructions    ED Visit recommended. Follow protocol based instructions.     Bring Your Own Device:  Please also bring your smart device(s) (smart phones, tablets, laptops) and their charging cables for your personal use and to communicate with your care team during your visit.  Call to EMS/911 recommended. Follow protocol based instructions.     Bring Your Own Device:  Please also bring your smart device(s) (smart phones, tablets, laptops) and their charging cables for your personal use and to communicate with your care team during your visit.    Thank you for taking steps to prevent the spread of this virus.  o Limit your contact with others.  o Wear a simple mask to cover your cough.  o Wash your hands well and often.    Resources    [facility]: About COVID-19: [email or web address]    CDC: What to Do If You're Sick:  www.cdc.gov/coronavirus/2019-ncov/about/steps-when-sick.html    CDC: Ending Home Isolation: www.cdc.gov/coronavirus/2019-ncov/hcp/disposition-in-home-patients.html     CDC: Caring for Someone: www.cdc.gov/coronavirus/2019-ncov/if-you-are-sick/care-for-someone.html     [facility]: Interim Guidance for Hospital Discharge to Home: www.health.[facility].mn./diseases/coronavirus/hcp/hospdischarge.pdf    [facility] clinical trials (COVID-19 research studies): clinicalaffairs.[facility]-clinical-trials     Below are the COVID-19 hotlines at the [place] Department of Health ([facility]). Interpreters are available.   o For health questions: Call 516-838-0955 or 1-788.621.4429 (7 a.m. to 7 p.m.)  o For questions about schools and childcare: Call 626-351-1960 or 1-442.435.8233 (7 a.m. to 7 p.m.)   Reason for Disposition    [1] Pain lasts > 10 minutes AND [2] age > 40 AND [3] associated chest, arm, neck, upper back or jaw pain    Additional Information    Negative: Severe difficulty breathing (e.g., struggling for each breath, speaks in single words)    Negative: Shock suspected (e.g., cold/pale/clammy skin, too weak to stand, low BP, rapid pulse)    Negative: Difficult to awaken or acting confused (e.g., disoriented, slurred speech)    Negative: Passed out (i.e., lost consciousness, collapsed and was not responding)    Negative: Visible sweat on face or sweat dripping down face    Negative: Sounds like a life-threatening emergency to the triager    Negative: Followed an abdomen (stomach) injury    Negative: Chest pain    Negative: [1] SEVERE pain (e.g., excruciating) AND [2] present > 1 hour    Negative: [1] Pain lasts > 10 minutes AND [2] age > 50    Protocols used: ABDOMINAL PAIN - UPPER-A-AH

## 2021-11-07 NOTE — PHARMACY-ADMISSION MEDICATION HISTORY
Admission medication history interview status for this patient is complete. See [facility] admission navigator for allergy information, prior to admission medications and immunization status.     Medication history interview done, indicate source(s): Patient  Medication history resources (including written lists, pill bottles, clinic record):SureScripts, Care Everywhere  Pharmacy: [id number] [address] [place] [address]    Changes made to PTA medication list:  Added: none  Changed: updated instruction for garlic, ibuprofen  Reported as Not Taking: none  Removed: none    Actions taken by pharmacist (provider contacted, etc):left sticky note for provider     Additional medication history information:none    Medication reconciliation/reorder completed by provider prior to medication history?  N    Prior to Admission medications    Medication Sig Last Dose Taking? Auth Provider   Ascorbic Acid (VITAMIN C PO) Take 1,000 mg by mouth daily  11/7/2021 at Unknown time Yes Reported, Patient   aspirin 81 MG EC tablet Take 81 mg by mouth daily 11/7/2021 at Unknown time Yes Reported, Patient   co-enzyme Q-10 100 MG CAPS capsule Take 100 mg by mouth daily 11/7/2021 at Unknown time Yes Reported, Patient   diltiazem ER COATED BEADS (CARDIZEM CD) 240 MG 24 hr capsule Take 1 capsule (240 mg) by mouth daily 11/6/2021 at PM Yes Juanis Davisno MD   Fish Oil-Cholecalciferol (FISH OIL + D3) 0126-5741 MG-UNIT CAPS Take 2 capsules by mouth daily 11/7/2021 at Unknown time Yes Reported, Patient   Garlic 1000 MG CAPS Take 1,000 mg by mouth daily  11/7/2021 at Unknown time Yes Reported, Patient   ibuprofen (ADVIL) 200 MG tablet Take 1-2 tablets by mouth every 6 hours as needed   at PRN Yes Reported, Patient   irbesartan (AVAPRO) 150 MG tablet Take 1 tablet (150 mg) by mouth daily 11/7/2021 at AM Yes Juanis Davison MD   Red Yeast Rice Extract 600 MG CAPS Take 600 mg by mouth daily  11/7/2021 at Unknown time Yes Reported, Patient   UNABLE TO FIND daily MEDICATION NAME: GOLO  Release dietary supplement 11/7/2021 at Unknown time Yes Reported, Patient   vitamin D3 (CHOLECALCIFEROL) 2000 units tablet Take 1 tablet by mouth daily 11/7/2021 at Unknown time Yes Reported, Patient   nitroGLYcerin (NITROSTAT) 0.4 MG sublingual tablet For chest pain place 1 tablet under the tongue every 5 minutes for 3 doses. If symptoms persist 5 minutes after 1st dose call 911.   Juanis Davison MD

## 2021-11-07 NOTE — ED PROVIDER NOTES
"  History   Chief Complaint:  Covid Concern and Nausea & Vomiting       The history is provided by the patient and the EMS personnel.      Shavonne Lundberg is a 71 year old female with history of hypertension who presents with Covid-19 and vomiting. Patient tested positive for Covid-19 at a [facility] in [place] yesterday after feeling sick for approximately one week with nausea and diarrhea. The past two mornings, the patient has awoken with left arm pain and heartburn that resolves as the day continues. Today, she called for EMS with continuation of her Covid-19 symptoms. EMS reports a blood pressure in the 200s that improved to 160s. Four milligrams of IV Zofran given en route as well. Here at the ED the patient is asking for the \"red pill\" that Pfizer produced for the treatment of Covid-19 symptoms.  She currently has no chest pain or arm pain.  She still feels nauseous and has lots of belching.  She has no history of any cardiac disease.    Review of Systems   Constitutional: Positive for chills. Negative for fever.   Respiratory: Positive for cough. Negative for shortness of breath.    Cardiovascular: Positive for chest pain.   Gastrointestinal: Positive for blood in stool, diarrhea, nausea and vomiting. Negative for abdominal pain.   Genitourinary: Negative for dysuria.   Psychiatric/Behavioral: Negative for confusion.   All other systems reviewed and are negative.      Allergies:  Sulfa Drugs    Medications:  Ascorbic Acid  Aspirin 81 MG EC tablet  co-enzyme Q-10 100 MG CAPS capsule  Coenzyme Q10-Fish Oil-Vit E   Diltiazem ER coated beads  Fish Oil-Cholecalciferol  Garlic 1000 MG CAPS  Ibuprofen   Irbesartan    Nitroglycerin    Red Yeast Rice Extract 600 MG CAPS  Ubiquinol 100 MG CAPS  Vitamin D3     Past Medical History:     HTN  Supraventricular Tachycardia  Hyperlipidemia  Elevated blood glucose  Sleep disturbance    Past Surgical History:     section, low traverse  Hernia repair    Family " "History:    Lung cancer  Arrhythmia  CAD    Social History:  Patient unaccompanied  PCP: Juanis Davison    Physical Exam     Patient Vitals for the past 24 hrs:   BP Temp Temp src Pulse Resp SpO2 Height Weight   11/07/21 1330 (!) 140/102 -- -- 102 -- 91 % -- --   11/07/21 1318 -- -- -- 86 14 (!) 88 % -- --   11/07/21 1315 -- -- -- 92 18 93 % -- --   11/07/21 1300 (!) 179/109 -- -- 98 -- 93 % -- --   11/07/21 1245 (!) 159/76 -- -- 92 -- 93 % -- --   11/07/21 1230 (!) 162/81 -- -- 90 -- (!) 88 % -- --   11/07/21 1215 (!) 154/72 -- -- 80 19 94 % -- --   11/07/21 1200 (!) 152/74 -- -- 85 -- 96 % -- --   11/07/21 1145 (!) 161/77 -- -- 87 -- 97 % -- --   11/07/21 1144 (!) 168/89 99.8  F (37.7  C) Oral 89 18 97 % 1.6 m (5' 3\") 68.9 kg (152 lb)       Physical Exam  Constitutional: Well appearing.  HEENT: Atraumatic.  Moist mucous membranes.  Neck: Soft.  Supple.  No JVD.  Cardiac: Regular rate and rhythm.  No murmur or rub.  Respiratory: Clear to auscultation bilaterally.  No respiratory distress.  No wheezing, rhonchi, or rales.  Abdomen: Soft and nontender. Nondistended.  Musculoskeletal: No edema.  Normal range of motion.  Neurologic: Alert and oriented x3.  Normal tone and bulk.   Skin: No rashes.  No edema.  Psych: Normal affect.  Normal behavior.    Emergency Department Course   ECG  ECG obtained at 1201, ECG read at 1205  Normal sinus rhythm. Normal ECG   No significant change as compared to prior, dated 09/23/13.  Rate 81 bpm. MI interval 154 ms. QRS duration 90 ms. QT/QTc 380/441 ms. P-R-T axes 52 27 50.     Imaging:  XR Chest Port 1 View   Preliminary Result   IMPRESSION: Patchy bibasilar opacities may be atypical pneumonia. Normal cardiac silhouette. No effusion or pneumothorax.        Report per radiology    Laboratory:  Labs Ordered and Resulted from Time of ED Arrival to Time of ED Departure   COMPREHENSIVE METABOLIC PANEL - Abnormal       Result Value    Sodium 133      Potassium 3.3 (*)     Chloride 96 "      Carbon Dioxide (CO2) 31      Anion Gap 6      Urea Nitrogen 6 (*)     Creatinine 0.38 (*)     Calcium 7.8 (*)     Glucose 99      Alkaline Phosphatase 72      AST 24      ALT 25      Protein Total 6.4 (*)     Albumin 2.8 (*)     Bilirubin Total 0.4      GFR Estimate >90     CBC WITH PLATELETS AND DIFFERENTIAL - Abnormal    WBC Count 2.2 (*)     RBC Count 4.20      Hemoglobin 12.6      Hematocrit 39.4      MCV 94      MCH 30.0      MCHC 32.0      RDW 11.9      Platelet Count 165      % Neutrophils 73      % Lymphocytes 14      % Monocytes 11      % Eosinophils 0      % Basophils 1      % Immature Granulocytes 1      NRBCs per 100 WBC 0      Absolute Neutrophils 1.6      Absolute Lymphocytes 0.3 (*)     Absolute Monocytes 0.2      Absolute Eosinophils 0.0      Absolute Basophils 0.0      Absolute Immature Granulocytes 0.0      Absolute NRBCs 0.0     TROPONIN I - Normal    Troponin I <0.015     ROUTINE UA WITH MICROSCOPIC REFLEX TO CULTURE - Normal    Color Urine Straw      Appearance Urine Clear      Glucose Urine Negative      Bilirubin Urine Negative      Ketones Urine Negative      Specific Gravity Urine 1.004      Blood Urine Negative      pH Urine 6.5      Protein Albumin Urine Negative      Urobilinogen Urine Normal      Nitrite Urine Negative      Leukocyte Esterase Urine Negative      RBC Urine 0      WBC Urine <1     D DIMER QUANTITATIVE - Normal    D-Dimer Quantitative 0.44     LIPASE - Normal    Lipase 92          Emergency Department Course:  Reviewed:  I reviewed nursing notes, vitals, past medical history and Care Everywhere    Assessments/Consults:  ED Course as of 11/07/21 1346   Sun Nov 07, 2021   1153 Obtained history and examined the patient as noted above     Interventions:  1211 Lidocaine 2 % 15 mL, alum & mag hydroxide-simethicone 15 mL GI Cocktail   1211 0.9% sodium chloride BOLUS 1000 mL, IV  1211 Ondansetron injection 4 mg, IV    Disposition:  The patient was admitted to the hospital  under the care of Dr. Smith.     Impression & Plan   Medical Decision Making:  Shavonne Lundberg is a 71-year-old woman who is afebrile and mildly hypertensive.  Initially had no hypoxia, however, eventually did develop hypoxia at rest requiring 2 L nasal cannula oxygen.  Her lab work-up is noted as above.  Her EKG and troponin revealed no evidence of ischemia.  Her chest x-ray is consistent with mild pneumonia, likely secondary to Covid infection.  She has a mild leukopenia.  D-dimer is within normal limits making a PE very unlikely.  She has no active chest pain at this time.  Given her hypoxia secondary to COVID-19 infection, discussed admission the hospital she is in agreement.  We ordered IV dexamethasone, however, she has had bad reactions to steroids in the past and would like to forego it.  The hospitalist had a discussion about this with her and we will hold off at this time.  Hospital service is in agreement with admission and she is in stable condition awaiting transfer to the floor.    Diagnosis:    ICD-10-CM    1. Acute respiratory failure with hypoxia (H)  J96.01    2. Pneumonia due to 2019 novel coronavirus  U07.1     J12.82      Scribe Disclosure:  Clemente RUCKER, am serving as a scribe at 11:50 AM on 11/7/2021 to document services personally performed by Matthew Nath MD based on my observations and the provider's statements to me.            Matthew Nath MD  11/07/21 [id number]

## 2021-11-07 NOTE — ED TRIAGE NOTES
"Presents to ED via EMS for COVID concern. Pt states she tested positive for COVID yesterday at [facility]. She states she has felt sick approximately one week. States nausea and diarrhea. Pt states for the past 2 mornings, she has woken up with left arm pain and heartburn that resolves throughout the morning. EMS reports initial BP 200s systolic. Improved to 160s. EMS gave 4mg IV Zofran. Pt requesting \"the red pill\" that Pfizer approved to help shorten symptoms of COVID.   "

## 2021-11-08 VITALS
HEIGHT: 63 IN | BODY MASS INDEX: 27.11 KG/M2 | RESPIRATION RATE: 20 BRPM | WEIGHT: 153 LBS | HEART RATE: 86 BPM | DIASTOLIC BLOOD PRESSURE: 70 MMHG | OXYGEN SATURATION: 94 % | SYSTOLIC BLOOD PRESSURE: 151 MMHG | TEMPERATURE: 98.4 F

## 2021-11-08 LAB
ALBUMIN SERPL-MCNC: 2.8 G/DL (ref 3.4–5)
ALP SERPL-CCNC: 72 U/L (ref 40–150)
ALT SERPL W P-5'-P-CCNC: 28 U/L (ref 0–50)
ANION GAP SERPL CALCULATED.3IONS-SCNC: 6 MMOL/L (ref 3–14)
AST SERPL W P-5'-P-CCNC: 25 U/L (ref 0–45)
ATRIAL RATE - MUSE: 81 BPM
BILIRUB SERPL-MCNC: 0.4 MG/DL (ref 0.2–1.3)
BUN SERPL-MCNC: 9 MG/DL (ref 7–30)
CALCIUM SERPL-MCNC: 8.1 MG/DL (ref 8.5–10.1)
CHLORIDE BLD-SCNC: 100 MMOL/L (ref 94–109)
CO2 SERPL-SCNC: 29 MMOL/L (ref 20–32)
CREAT SERPL-MCNC: 0.43 MG/DL (ref 0.52–1.04)
CRP SERPL-MCNC: 11.4 MG/L (ref 0–8)
DIASTOLIC BLOOD PRESSURE - MUSE: NORMAL MMHG
ERYTHROCYTE [DISTWIDTH] IN BLOOD BY AUTOMATED COUNT: 11.9 % (ref 10–15)
GFR SERPL CREATININE-BSD FRML MDRD: >90 ML/MIN/1.73M2
GLUCOSE BLD-MCNC: 135 MG/DL (ref 70–99)
HCT VFR BLD AUTO: 41.1 % (ref 35–47)
HGB BLD-MCNC: 13 G/DL (ref 11.7–15.7)
INTERPRETATION ECG - MUSE: NORMAL
MCH RBC QN AUTO: 30 PG (ref 26.5–33)
MCHC RBC AUTO-ENTMCNC: 31.6 G/DL (ref 31.5–36.5)
MCV RBC AUTO: 95 FL (ref 78–100)
P AXIS - MUSE: 52 DEGREES
PLATELET # BLD AUTO: 188 10E3/UL (ref 150–450)
POTASSIUM BLD-SCNC: 3.5 MMOL/L (ref 3.4–5.3)
POTASSIUM BLD-SCNC: 3.5 MMOL/L (ref 3.4–5.3)
PR INTERVAL - MUSE: 154 MS
PROT SERPL-MCNC: 6.4 G/DL (ref 6.8–8.8)
QRS DURATION - MUSE: 90 MS
QT - MUSE: 380 MS
QTC - MUSE: 441 MS
R AXIS - MUSE: 27 DEGREES
RBC # BLD AUTO: 4.33 10E6/UL (ref 3.8–5.2)
SODIUM SERPL-SCNC: 135 MMOL/L (ref 133–144)
SYSTOLIC BLOOD PRESSURE - MUSE: NORMAL MMHG
T AXIS - MUSE: 50 DEGREES
VENTRICULAR RATE- MUSE: 81 BPM
WBC # BLD AUTO: 2.7 10E3/UL (ref 4–11)

## 2021-11-08 PROCEDURE — 99207 PR CDG-CODE CATEGORY CHANGED: CPT | Performed by: STUDENT IN AN ORGANIZED HEALTH CARE EDUCATION/TRAINING PROGRAM

## 2021-11-08 PROCEDURE — 99217 PR OBSERVATION CARE DISCHARGE: CPT | Performed by: STUDENT IN AN ORGANIZED HEALTH CARE EDUCATION/TRAINING PROGRAM

## 2021-11-08 PROCEDURE — 86140 C-REACTIVE PROTEIN: CPT | Performed by: STUDENT IN AN ORGANIZED HEALTH CARE EDUCATION/TRAINING PROGRAM

## 2021-11-08 PROCEDURE — 250N000013 HC RX MED GY IP 250 OP 250 PS 637: Performed by: STUDENT IN AN ORGANIZED HEALTH CARE EDUCATION/TRAINING PROGRAM

## 2021-11-08 PROCEDURE — 84132 ASSAY OF SERUM POTASSIUM: CPT | Performed by: STUDENT IN AN ORGANIZED HEALTH CARE EDUCATION/TRAINING PROGRAM

## 2021-11-08 PROCEDURE — 85027 COMPLETE CBC AUTOMATED: CPT | Performed by: STUDENT IN AN ORGANIZED HEALTH CARE EDUCATION/TRAINING PROGRAM

## 2021-11-08 PROCEDURE — G0378 HOSPITAL OBSERVATION PER HR: HCPCS

## 2021-11-08 PROCEDURE — 80053 COMPREHEN METABOLIC PANEL: CPT | Performed by: STUDENT IN AN ORGANIZED HEALTH CARE EDUCATION/TRAINING PROGRAM

## 2021-11-08 PROCEDURE — 36415 COLL VENOUS BLD VENIPUNCTURE: CPT | Performed by: STUDENT IN AN ORGANIZED HEALTH CARE EDUCATION/TRAINING PROGRAM

## 2021-11-08 RX ADMIN — Medication 50 MCG: at 08:43

## 2021-11-08 RX ADMIN — ASPIRIN 81 MG: 81 TABLET, COATED ORAL at 08:43

## 2021-11-08 RX ADMIN — IRBESARTAN 150 MG: 75 TABLET ORAL at 08:43

## 2021-11-08 NOTE — DISCHARGE SUMMARY
[facility]  Discharge Summary  Name: Shavonne Lundberg    MRN: [id number]  YOB: 1950    Age: 71 year old  Date of Discharge:  11/8/2021  1:32 PM  Date of Admission: 11/7/2021  Primary Care Provider: Juanis Davison  Discharge Physician:  Iván Smith DO  Discharging Service:  Hospitalist      Hospital Course  Shavonne Lundberg is a 71 year old female with PMH including tachycardia on diltiazem, hyperlipidemia, hypertension who presents with nausea/vomiting, left arm pain.  The patient was monitored overnight.  She required oxygen only while sleeping.  But as soon as she awoke she no longer required supplemental oxygen.  She underwent oxygen evaluation on the day of discharge and did not require any supplemental oxygen with rest or with exertion.  Discharged home with a pulse oximeter.    Discharge Diagnoses:  COVID-19 pneumonia, moderate  Acute respiratory insufficiency:  Patient presents with nausea, abdominal pain, left arm pain.  She had a positive Covid test on 11/6 at an outside clinic after feeling poorly for the past week.  She presented given the new symptoms of left arm pain with concern for cardiac equivalent.  X-ray is notable for patchy bibasilar opacities consistent with an atypical pneumonia.  I wonder if all her symptoms are related to the Covid.  She is not requiring any supplemental oxygen with rest or activity.  It is interesting that she desaturates when sleeping, but I suspect this is just related to undiagnosed sleep apnea.  She reports that she was undergoing work-up for this but was unable to complete a sleep study in the past as the machine kept waking her up in the middle of the procedure.  She was not requiring any supplemental oxygen at rest or on exertion on the day of discharge.  She was discharged with a pulse oximeter to be checking her oxygen's occasionally at home.     Hypertension:  Patient has been hypertensive throughout her ED stay.  Mostly 150-160s. She reports a history of HTN which she takes irbesartan 150 mg daily.  She reports that she is been taking extra half tablet daily.  -Continue PTA irbesartan      Tachycardia:  Patient unable to report the etiology of her tachycardia.  She reports that she is been taking diltiazem for many many years now.  -Continue PTA diltiazem     Suspect undiagnosed obstructive sleep apnea:  Patient desaturates only while sleeping.  I suspect this is related to undiagnosed sleep apnea as the patient has been worked up for this in the past.  Unfortunately she was unable to tolerate formal sleep study as an outpatient.      Discharge Disposition:  Discharged to home    Allergies:  Allergies   Allergen Reactions     Sulfa Drugs Anaphylaxis        Discharge Medications:        Review of your medicines      CONTINUE these medicines which have NOT CHANGED      Dose / Directions   Advil 200 MG tablet  Generic drug: ibuprofen      Dose: 1-2 tablet  Take 1-2 tablets by mouth every 6 hours as needed  Refills: 0     aspirin 81 MG EC tablet      Dose: 81 mg  Take 81 mg by mouth daily  Refills: 0     co-enzyme Q-10 100 MG Caps capsule      Dose: 100 mg  Take 100 mg by mouth daily  Refills: 0     diltiazem ER COATED BEADS 240 MG 24 hr capsule  Commonly known as: Cardizem CD  Used for: Supraventricular tachycardia (H)      Dose: 240 mg  Take 1 capsule (240 mg) by mouth daily  Quantity: 90 capsule  Refills: 3     Fish Oil + D3 [id number] MG-UNIT Caps      Dose: 2 capsule  Take 2 capsules by mouth daily  Refills: 0     Garlic 1000 MG Caps      Dose: 1,000 mg  Take 1,000 mg by mouth daily  Refills: 0     irbesartan 150 MG tablet  Commonly known as: AVAPRO  Used for: Benign essential hypertension      Dose: 150 mg  Take 1 tablet (150 mg) by mouth daily  Quantity: 90 tablet  Refills: 3     nitroGLYcerin 0.4 MG sublingual tablet  Commonly known as: NITROSTAT  Used for: Other chest pain      For chest pain place 1 tablet under the  "tongue every 5 minutes for 3 doses. If symptoms persist 5 minutes after 1st dose call 911.  Quantity: 30 tablet  Refills: 1     Red Yeast Rice Extract 600 MG Caps      Dose: 600 mg  Take 600 mg by mouth daily  Refills: 0     UNABLE TO FIND      daily MEDICATION NAME: GOLO  Release dietary supplement  Refills: 0     VITAMIN C PO      Dose: 1,000 mg  Take 1,000 mg by mouth daily  Refills: 0     vitamin D3 50 mcg (2000 units) tablet  Commonly known as: CHOLECALCIFEROL      Dose: 1 tablet  Take 1 tablet by mouth daily  Refills: 0          Condition on Discharge:  Discharge condition: Good       Code status on discharge: Full Code     History of Illness:  See detailed admission note for full details.    Physical Exam:  Vital signs:  Temp: 98.4  F (36.9  C) Temp src: Temporal BP: (!) 151/70 Pulse: 86   Resp: 20 SpO2: 94 % O2 Device: None (Room air) Oxygen Delivery: 2 LPM Height: 160 cm (5' 3\") Weight: 69.4 kg (153 lb)  Estimated body mass index is 27.1 kg/m  as calculated from the following:    Height as of this encounter: 1.6 m (5' 3\").    Weight as of this encounter: 69.4 kg (153 lb).    Wt Readings from Last 1 Encounters:   11/07/21 69.4 kg (153 lb)     General: Alert, awake, no acute distress.  HEENT: NC/AT, eyes anicteric, external occular movements intact, face symmetric.  Dentition WNL, MM moist.  Cardiac: RRR, S1, S2.  No murmurs appreciated.  Pulmonary: Normal chest rise, normal work of breathing.  Lungs CTA BL  Abdomen: soft, non-tender, non-distended.  Bowel Sounds Present.  No guarding.  Extremities: no deformities.  Warm, well perfused.  Skin: no rashes or lesions noted.  Warm and Dry.  Neuro: No focal deficits noted.  Speech clear.  Coordination and strength grossly normal.  Psych: Appropriate affect.    Procedures other than Imaging:  None     Imaging:  Recent Results (from the past 48 hour(s))   XR Chest Port 1 View    Narrative    EXAM: XR CHEST PORT 1 VIEW  LOCATION: [facility]" HOSPITAL  DATE/TIME: 11/7/2021 12:53 PM    INDICATION: COVID, chest pain.  COMPARISON: None.      Impression    IMPRESSION: Patchy bibasilar opacities may be atypical pneumonia. Normal cardiac silhouette. No effusion or pneumothorax.        Consultations:  No consultations were requested during this admission.       Recent Lab Results:  Recent Labs   Lab 11/08/21  1007 11/07/21  1212   WBC 2.7* 2.2*   HGB 13.0 12.6   HCT 41.1 39.4   MCV 95 94    165          Lab Results   Component Value Date     11/08/2021     11/07/2021     01/07/2021     01/06/2020     12/27/2018    Lab Results   Component Value Date    CHLORIDE 100 11/08/2021    CHLORIDE 96 11/07/2021    CHLORIDE 105 01/07/2021    CHLORIDE 105 01/06/2020    CHLORIDE 101 12/27/2018    Lab Results   Component Value Date    BUN 9 11/08/2021    BUN 6 11/07/2021    BUN 10 01/07/2021    BUN 11 01/06/2020    BUN 10 12/27/2018      Lab Results   Component Value Date    POTASSIUM 3.5 11/08/2021    POTASSIUM 3.5 11/08/2021    POTASSIUM 4.1 11/07/2021    POTASSIUM 4.0 01/07/2021    POTASSIUM 4.0 01/06/2020    POTASSIUM 4.2 12/27/2018    Lab Results   Component Value Date    CO2 29 11/08/2021    CO2 31 11/07/2021    CO2 29 01/07/2021    CO2 28 01/06/2020    CO2 30 12/27/2018    Lab Results   Component Value Date    CR 0.43 11/08/2021    CR 0.46 11/07/2021    CR 0.38 11/07/2021    CR 0.48 01/07/2021    CR 0.45 01/06/2020    CR 0.53 12/27/2018             Pending Results:    Unresulted Labs Ordered in the Past 30 Days of this Admission     No orders found for last 31 day(s).           Discharge Instructions and Follow-Up:   Discharge Procedure Orders   COVID-19 GetWell Loop Referral   Referral Priority: Routine Referral Type: Consultation   Number of Visits Requested: 1     Reason for your hospital stay   Order Comments: You were hospitalized to be monitored overnight for COVID infection. You did well during your hospitalization and are  stable for discharge.     Follow-up and recommended labs and tests    Order Comments: Follow up with primary care provider, Juanis Davison, within 7 days for hospital follow- up.  No follow up labs or test are needed.     Activity   Order Comments: Your activity upon discharge: activity as tolerated     Order Specific Question Answer Comments   Is discharge order? Yes      Contact provider   Order Comments: Contact your primary care provider if If increased trouble breathing, new arm/leg swelling, dizziness/passing out, falls, bleeding that doesn't stop, or uncontrolled pain.     Discharge - Quarantine/Isolation Instruction   Order Comments: Date of symptom onset:     Date of first positive test:    If you tested positive COVID-19 and show symptoms (fever, cough, body aches or trouble breathing):        Stay home and away from others (self-isolate) until:        At least 10 days have passed since your symptoms started. AND...        You've had no fever-and no medicine that reduces fever for 1 full day (24 hours). AND...         Your other symptoms have resolved (gotten better).  If you tested positive for COVID-19 but don't show symptoms:       Stay home and away from others (self-isolate) until at least 10 days have passed since the date of your first positive COVID-19 test.     Pulse Oximeter Equipment     Diet   Order Comments: Follow this diet upon discharge: Orders Placed This Encounter      Regular Diet Adult     Order Specific Question Answer Comments   Is discharge order? Yes        Total time spent in face to face contact with the patient and coordinating discharge was:  >30 Minutes.    Iván Smith, DO

## 2021-11-08 NOTE — PLAN OF CARE
Pt up with SBA to and from the bathroom. Denies pain. LS diminished with infrequent nonproductive cough. Needed 1 LPM while sleeping however as pt slept on abdomen, O2 was at 98%. Abdomen rounded, BS present, passing flatus. LBM 11/6/21. Nausea intermittently and dry heaving. CMS intact with no numbness/tingling. Voiding adequately. Lovenox for anticoagulant. AVSS. Plan is to discharge to home today if meets criteria.

## 2021-11-08 NOTE — PLAN OF CARE
Patient arrived on unit from the ED at approximately 1630 on a cart, walked to the bed with stand by assist. Is A&Ox4, denied pain, is on 2L of oxygen per nasal canula, lung sound are diminished. Is voiding adequately, tolerated regular diet potassium 4.1 after replacement. Will continue to monitor.

## 2021-11-08 NOTE — PLAN OF CARE
Pt A&Ox4. VSS afebrile RA. Pt up independently in room. Infrequent nonproductive cough. Intermittent nausea. Tolerated regular diet. Poor appetite. Pt said she could not taste the food. Cms intact. LS diminished but clear. Pt is discharging home with pulse oximeter. Discharge instructions given to pt. Pulse oximeter given to pt. Instructions and questions discussed. All belongings taken with pt. Pt left at 1335

## 2021-11-08 NOTE — PROVIDER NOTIFICATION
Patient has been assessed for Home Oxygen needs. Oxygen readings:    *Pulse oximetry (SpO2) = 94% on room air at rest while awake.    *SpO2 improved to 94% on 0 liters/minute at rest.    *SpO2 = 92% on room air during activity/with exercise.    *SpO2 improved to 92 % on 0 liters/minute during activity/with exercise.    Pt dropped to 91% lowest Ra with activity. Rebounded up to 94% when sat down again.

## 2021-11-09 ENCOUNTER — PATIENT OUTREACH (OUTPATIENT)
Dept: CARE COORDINATION | Facility: CLINIC | Age: 71
End: 2021-11-09

## 2021-11-09 ENCOUNTER — TELEPHONE (OUTPATIENT)
Dept: FAMILY MEDICINE | Facility: CLINIC | Age: 71
End: 2021-11-09

## 2021-11-09 ENCOUNTER — NURSE TRIAGE (OUTPATIENT)
Dept: NURSING | Facility: CLINIC | Age: 71
End: 2021-11-09
Payer: COMMERCIAL

## 2021-11-09 ENCOUNTER — APPOINTMENT (OUTPATIENT)
Dept: GENERAL RADIOLOGY | Facility: CLINIC | Age: 71
DRG: 177 | End: 2021-11-09
Attending: EMERGENCY MEDICINE
Payer: COMMERCIAL

## 2021-11-09 ENCOUNTER — HOSPITAL ENCOUNTER (INPATIENT)
Facility: CLINIC | Age: 71
LOS: 3 days | Discharge: HOME | DRG: 177 | End: 2021-11-13
Attending: EMERGENCY MEDICINE | Admitting: INTERNAL MEDICINE
Payer: COMMERCIAL

## 2021-11-09 DIAGNOSIS — J96.01 ACUTE RESPIRATORY FAILURE WITH HYPOXIA (H): ICD-10-CM

## 2021-11-09 DIAGNOSIS — R11.0 NAUSEA: Primary | ICD-10-CM

## 2021-11-09 DIAGNOSIS — J12.82 PNEUMONIA DUE TO 2019 NOVEL CORONAVIRUS: ICD-10-CM

## 2021-11-09 DIAGNOSIS — Z28.9: Primary | ICD-10-CM

## 2021-11-09 DIAGNOSIS — U07.1 INFECTION DUE TO 2019 NOVEL CORONAVIRUS: ICD-10-CM

## 2021-11-09 DIAGNOSIS — U07.1 PNEUMONIA DUE TO 2019 NOVEL CORONAVIRUS: ICD-10-CM

## 2021-11-09 DIAGNOSIS — Z71.89 OTHER SPECIFIED COUNSELING: ICD-10-CM

## 2021-11-09 LAB
BASE EXCESS BLDV CALC-SCNC: 9.1 MMOL/L (ref -7.7–1.9)
BASOPHILS # BLD AUTO: 0 10E3/UL (ref 0–0.2)
BASOPHILS NFR BLD AUTO: 0 %
EOSINOPHIL # BLD AUTO: 0 10E3/UL (ref 0–0.7)
EOSINOPHIL NFR BLD AUTO: 0 %
ERYTHROCYTE [DISTWIDTH] IN BLOOD BY AUTOMATED COUNT: 11.9 % (ref 10–15)
HCO3 BLDV-SCNC: 33 MMOL/L (ref 21–28)
HCT VFR BLD AUTO: 43 % (ref 35–47)
HGB BLD-MCNC: 14.4 G/DL (ref 11.7–15.7)
IMM GRANULOCYTES # BLD: 0 10E3/UL
IMM GRANULOCYTES NFR BLD: 0 %
LACTATE SERPL-SCNC: 1.1 MMOL/L (ref 0.7–2)
LYMPHOCYTES # BLD AUTO: 0.7 10E3/UL (ref 0.8–5.3)
LYMPHOCYTES NFR BLD AUTO: 11 %
MCH RBC QN AUTO: 30.6 PG (ref 26.5–33)
MCHC RBC AUTO-ENTMCNC: 33.5 G/DL (ref 31.5–36.5)
MCV RBC AUTO: 92 FL (ref 78–100)
MONOCYTES # BLD AUTO: 0.6 10E3/UL (ref 0–1.3)
MONOCYTES NFR BLD AUTO: 10 %
NEUTROPHILS # BLD AUTO: 4.5 10E3/UL (ref 1.6–8.3)
NEUTROPHILS NFR BLD AUTO: 79 %
NRBC # BLD AUTO: 0 10E3/UL
NRBC BLD AUTO-RTO: 0 /100
O2/TOTAL GAS SETTING VFR VENT: 21 %
PCO2 BLDV: 43 MM HG (ref 40–50)
PH BLDV: 7.5 [PH] (ref 7.32–7.43)
PLATELET # BLD AUTO: 233 10E3/UL (ref 150–450)
PO2 BLDV: 29 MM HG (ref 25–47)
RBC # BLD AUTO: 4.7 10E6/UL (ref 3.8–5.2)
WBC # BLD AUTO: 5.8 10E3/UL (ref 4–11)

## 2021-11-09 PROCEDURE — 82040 ASSAY OF SERUM ALBUMIN: CPT | Performed by: EMERGENCY MEDICINE

## 2021-11-09 PROCEDURE — 83605 ASSAY OF LACTIC ACID: CPT | Performed by: EMERGENCY MEDICINE

## 2021-11-09 PROCEDURE — C9803 HOPD COVID-19 SPEC COLLECT: HCPCS

## 2021-11-09 PROCEDURE — 99285 EMERGENCY DEPT VISIT HI MDM: CPT | Mod: 25

## 2021-11-09 PROCEDURE — 87040 BLOOD CULTURE FOR BACTERIA: CPT | Performed by: EMERGENCY MEDICINE

## 2021-11-09 PROCEDURE — 84484 ASSAY OF TROPONIN QUANT: CPT | Performed by: EMERGENCY MEDICINE

## 2021-11-09 PROCEDURE — 86140 C-REACTIVE PROTEIN: CPT | Performed by: EMERGENCY MEDICINE

## 2021-11-09 PROCEDURE — 36415 COLL VENOUS BLD VENIPUNCTURE: CPT | Performed by: EMERGENCY MEDICINE

## 2021-11-09 PROCEDURE — 83735 ASSAY OF MAGNESIUM: CPT | Performed by: EMERGENCY MEDICINE

## 2021-11-09 PROCEDURE — 85025 COMPLETE CBC W/AUTO DIFF WBC: CPT | Performed by: EMERGENCY MEDICINE

## 2021-11-09 PROCEDURE — 71045 X-RAY EXAM CHEST 1 VIEW: CPT

## 2021-11-09 PROCEDURE — 84145 PROCALCITONIN (PCT): CPT | Performed by: EMERGENCY MEDICINE

## 2021-11-09 PROCEDURE — 82803 BLOOD GASES ANY COMBINATION: CPT | Performed by: EMERGENCY MEDICINE

## 2021-11-09 RX ORDER — DEXAMETHASONE SODIUM PHOSPHATE 10 MG/ML
6 INJECTION, SOLUTION INTRAMUSCULAR; INTRAVENOUS ONCE
Status: COMPLETED | OUTPATIENT
Start: 2021-11-09 | End: 2021-11-10

## 2021-11-09 RX ORDER — ONDANSETRON 4 MG/1
4 TABLET, FILM COATED ORAL EVERY 8 HOURS PRN
Qty: 6 TABLET | Refills: 0 | Status: SHIPPED | OUTPATIENT
Start: 2021-11-09 | End: 2021-11-09

## 2021-11-09 ASSESSMENT — ENCOUNTER SYMPTOMS
SHORTNESS OF BREATH: 1
HEADACHES: 1
FEVER: 1

## 2021-11-09 NOTE — TELEPHONE ENCOUNTER
Since patient was hospitalized, would have anticipated a Hospital follow up appt.   This would be best suited for an e-visit or video visit but would be helpful if initial triage would provide that information/recommendation.   Will provide #6 zofran .   If still having health issues then needs assessment/appt. .  Looks like she is also due for BLOOD PRESSURE recheck .     Juanis Davison MD  Internal Medicine  electronically signed

## 2021-11-09 NOTE — TELEPHONE ENCOUNTER
"RN Triage:    Diagnosed with Covid on 11/5/21.  Had been feverish and sick the previous 5 days before diagnosis.  Was hospitalized on 11/7/21 for hypoxia and pneumonia.  Discharged on 11/8/21.  Calling this evening because she is not better yet.  Temp 100.3 now.  Wet cough  Nausea  Diarrhea  \"I'm about ready to give up\"  Decreased appetite but is trying to eat and drink a little.  Home care discussed.  Granddaughter, Linda, is there with her.  zofran was sent to pharmacy today.    Plan:  Linda is going to watch her closely tonight and administer home care.  If Shavonne becomes short of breath, or if fever goes over 101, she will call back.  Linda was transferred to  to make virtual appointment tomorrow for Shavonne.    Jacquie Gonzalez RN 11/09/21 6:32 PM  [facility] Nurse Advisor              Reason for Disposition    HIGH RISK for severe COVID complications (e.g., age > 64 years, obesity with BMI > 25, pregnant, chronic lung disease or other chronic medical condition)  (Exception: Already seen by PCP and no new or worsening symptoms.)    Fever present > 3 days (72 hours)    Additional Information    Negative: SEVERE difficulty breathing (e.g., struggling for each breath, speaks in single words)    Negative: Difficult to awaken or acting confused (e.g., disoriented, slurred speech)    Negative: Bluish (or gray) lips or face now    Negative: Shock suspected (e.g., cold/pale/clammy skin, too weak to stand, low BP, rapid pulse)    Negative: Sounds like a life-threatening emergency to the triager    Negative: [1] COVID-19 exposure AND [2] no symptoms    Negative: COVID-19 vaccine reaction suspected (e.g., fever, headache, muscle aches) occurring 1 to 3 days after getting vaccine    Negative: COVID-19 vaccine, questions about    Negative: [1] Lives with someone known to have influenza (flu test positive) AND [2] flu-like symptoms (e.g., cough, runny nose, sore throat, SOB; with or without fever)    " Negative: [1] Adult with possible COVID-19 symptoms AND [2] triager concerned about severity of symptoms or other causes    Negative: COVID-19 and breastfeeding, questions about    Negative: SEVERE or constant chest pain or pressure (Exception: mild central chest pain, present only when coughing)    Negative: MODERATE difficulty breathing (e.g., speaks in phrases, SOB even at rest, pulse 100-120)    Negative: [1] Headache AND [2] stiff neck (can't touch chin to chest)    Negative: MILD difficulty breathing (e.g., minimal/no SOB at rest, SOB with walking, pulse <100)    Negative: Chest pain or pressure    Negative: Patient sounds very sick or weak to the triager    Negative: Fever > 103 F (39.4 C)    Negative: [1] Fever > 101 F (38.3 C) AND [2] age > 60 years    Negative: [1] Fever > 100.0 F (37.8 C) AND [2] bedridden (e.g., nursing home patient, CVA, chronic illness, recovering from surgery)    Protocols used: CORONAVIRUS (COVID-19) DIAGNOSED OR [id number] 8.25.2021

## 2021-11-09 NOTE — TELEPHONE ENCOUNTER
S-(situation): Patient with Covid, asking for rxs for anti emetic (ODT) and cough medication    B-(background): Reports she has felt poorly since 10/31, had positive Covid test 11/5/21 and was hospitalized overnight 11/7 for observation    A-(assessment): Patient with mostly non-productive cough, intermittent shortness of breath, nausea.  O2 sat 95% with occasional short dips into the 80s.  She states she bounces right back up to the 90s. She has had terrible nausea, emesis x1 last evening with dry heaves.  Her mouth is very dry, she has had 16-24 oz of fluid in the past 24 hours and has been urinating pale yellow urine.     R-(recommendations): Patient wants message sent to provider.  Feels she can manage at home if she has something to reduce the nausea and if she can take something for her frequent cough that doesn't cause problems with her tachycardia.  FAWAD Wallace R.N.

## 2021-11-09 NOTE — PROGRESS NOTES
Clinic Care Coordination Contact  [facility]/Voicemail       Clinical Data: Care Coordinator Outreach  Outreach x 1.  Patient stated she was having difficulties with her covid symptoms and she wanted to get prescribed medication. She said she had contacted her PCP office and they were going to call her back. Asked if the patient wanted to speak to nurse triage but she said she was going to wait for PCP office to call back. I told the patient I would call her back tomorrow morning to check in on her.   Plan:Care Coordinator will reach out to patient again in 1-2 business days.    Latosha Major  Community Health Worker  [facility]  [phone]

## 2021-11-10 LAB
ALBUMIN SERPL-MCNC: 2.8 G/DL (ref 3.4–5)
ALBUMIN SERPL-MCNC: 3.1 G/DL (ref 3.4–5)
ALP SERPL-CCNC: 76 U/L (ref 40–150)
ALP SERPL-CCNC: 82 U/L (ref 40–150)
ALT SERPL W P-5'-P-CCNC: 29 U/L (ref 0–50)
ALT SERPL W P-5'-P-CCNC: 34 U/L (ref 0–50)
ANION GAP SERPL CALCULATED.3IONS-SCNC: 5 MMOL/L (ref 3–14)
ANION GAP SERPL CALCULATED.3IONS-SCNC: 6 MMOL/L (ref 3–14)
AST SERPL W P-5'-P-CCNC: 25 U/L (ref 0–45)
AST SERPL W P-5'-P-CCNC: 34 U/L (ref 0–45)
ATRIAL RATE - MUSE: 78 BPM
BILIRUB SERPL-MCNC: 0.4 MG/DL (ref 0.2–1.3)
BILIRUB SERPL-MCNC: 0.4 MG/DL (ref 0.2–1.3)
BUN SERPL-MCNC: 6 MG/DL (ref 7–30)
BUN SERPL-MCNC: 9 MG/DL (ref 7–30)
CALCIUM SERPL-MCNC: 8.4 MG/DL (ref 8.5–10.1)
CALCIUM SERPL-MCNC: 8.5 MG/DL (ref 8.5–10.1)
CHLORIDE BLD-SCNC: 103 MMOL/L (ref 94–109)
CHLORIDE BLD-SCNC: 95 MMOL/L (ref 94–109)
CO2 SERPL-SCNC: 29 MMOL/L (ref 20–32)
CO2 SERPL-SCNC: 32 MMOL/L (ref 20–32)
CREAT SERPL-MCNC: 0.4 MG/DL (ref 0.52–1.04)
CREAT SERPL-MCNC: 0.51 MG/DL (ref 0.52–1.04)
CRP SERPL-MCNC: 20.9 MG/L (ref 0–8)
D DIMER PPP FEU-MCNC: 0.48 UG/ML FEU (ref 0–0.5)
DIASTOLIC BLOOD PRESSURE - MUSE: NORMAL MMHG
GFR SERPL CREATININE-BSD FRML MDRD: >90 ML/MIN/1.73M2
GFR SERPL CREATININE-BSD FRML MDRD: >90 ML/MIN/1.73M2
GLUCOSE BLD-MCNC: 136 MG/DL (ref 70–99)
GLUCOSE BLD-MCNC: 138 MG/DL (ref 70–99)
INTERPRETATION ECG - MUSE: NORMAL
MAGNESIUM SERPL-MCNC: 2.1 MG/DL (ref 1.6–2.3)
P AXIS - MUSE: 43 DEGREES
POTASSIUM BLD-SCNC: 3.3 MMOL/L (ref 3.4–5.3)
POTASSIUM BLD-SCNC: 4.6 MMOL/L (ref 3.4–5.3)
POTASSIUM BLD-SCNC: 4.7 MMOL/L (ref 3.4–5.3)
PR INTERVAL - MUSE: 146 MS
PROCALCITONIN SERPL-MCNC: <0.05 NG/ML
PROT SERPL-MCNC: 6.8 G/DL (ref 6.8–8.8)
PROT SERPL-MCNC: 7.1 G/DL (ref 6.8–8.8)
QRS DURATION - MUSE: 88 MS
QT - MUSE: 376 MS
QTC - MUSE: 428 MS
R AXIS - MUSE: 19 DEGREES
SODIUM SERPL-SCNC: 132 MMOL/L (ref 133–144)
SODIUM SERPL-SCNC: 138 MMOL/L (ref 133–144)
SYSTOLIC BLOOD PRESSURE - MUSE: NORMAL MMHG
T AXIS - MUSE: 57 DEGREES
TROPONIN I SERPL-MCNC: <0.015 UG/L (ref 0–0.04)
VENTRICULAR RATE- MUSE: 78 BPM

## 2021-11-10 PROCEDURE — 36415 COLL VENOUS BLD VENIPUNCTURE: CPT | Performed by: EMERGENCY MEDICINE

## 2021-11-10 PROCEDURE — 250N000011 HC RX IP 250 OP 636: Performed by: EMERGENCY MEDICINE

## 2021-11-10 PROCEDURE — 250N000013 HC RX MED GY IP 250 OP 250 PS 637: Performed by: INTERNAL MEDICINE

## 2021-11-10 PROCEDURE — 250N000011 HC RX IP 250 OP 636: Performed by: INTERNAL MEDICINE

## 2021-11-10 PROCEDURE — 258N000003 HC RX IP 258 OP 636: Performed by: EMERGENCY MEDICINE

## 2021-11-10 PROCEDURE — 84132 ASSAY OF SERUM POTASSIUM: CPT | Performed by: STUDENT IN AN ORGANIZED HEALTH CARE EDUCATION/TRAINING PROGRAM

## 2021-11-10 PROCEDURE — 120N000001 HC R&B MED SURG/OB

## 2021-11-10 PROCEDURE — 80053 COMPREHEN METABOLIC PANEL: CPT | Performed by: STUDENT IN AN ORGANIZED HEALTH CARE EDUCATION/TRAINING PROGRAM

## 2021-11-10 PROCEDURE — 96366 THER/PROPH/DIAG IV INF ADDON: CPT

## 2021-11-10 PROCEDURE — 96375 TX/PRO/DX INJ NEW DRUG ADDON: CPT

## 2021-11-10 PROCEDURE — 85379 FIBRIN DEGRADATION QUANT: CPT | Performed by: EMERGENCY MEDICINE

## 2021-11-10 PROCEDURE — 250N000009 HC RX 250: Performed by: STUDENT IN AN ORGANIZED HEALTH CARE EDUCATION/TRAINING PROGRAM

## 2021-11-10 PROCEDURE — 99223 1ST HOSP IP/OBS HIGH 75: CPT | Mod: AI | Performed by: INTERNAL MEDICINE

## 2021-11-10 PROCEDURE — 36415 COLL VENOUS BLD VENIPUNCTURE: CPT | Performed by: STUDENT IN AN ORGANIZED HEALTH CARE EDUCATION/TRAINING PROGRAM

## 2021-11-10 PROCEDURE — 250N000012 HC RX MED GY IP 250 OP 636 PS 637: Performed by: INTERNAL MEDICINE

## 2021-11-10 PROCEDURE — 96361 HYDRATE IV INFUSION ADD-ON: CPT

## 2021-11-10 PROCEDURE — 96365 THER/PROPH/DIAG IV INF INIT: CPT | Mod: 59

## 2021-11-10 PROCEDURE — 258N000003 HC RX IP 258 OP 636: Performed by: STUDENT IN AN ORGANIZED HEALTH CARE EDUCATION/TRAINING PROGRAM

## 2021-11-10 RX ORDER — LOPERAMIDE HCL 2 MG
2 CAPSULE ORAL 4 TIMES DAILY PRN
Status: DISCONTINUED | OUTPATIENT
Start: 2021-11-10 | End: 2021-11-13 | Stop reason: HOSPADM

## 2021-11-10 RX ORDER — BENZONATATE 100 MG/1
100 CAPSULE ORAL 3 TIMES DAILY PRN
Status: DISCONTINUED | OUTPATIENT
Start: 2021-11-10 | End: 2021-11-13 | Stop reason: HOSPADM

## 2021-11-10 RX ORDER — DILTIAZEM HYDROCHLORIDE 120 MG/1
240 CAPSULE, COATED, EXTENDED RELEASE ORAL DAILY
Status: DISCONTINUED | OUTPATIENT
Start: 2021-11-10 | End: 2021-11-13 | Stop reason: HOSPADM

## 2021-11-10 RX ORDER — ACETAMINOPHEN 325 MG/1
650 TABLET ORAL EVERY 6 HOURS PRN
Status: DISCONTINUED | OUTPATIENT
Start: 2021-11-10 | End: 2021-11-13 | Stop reason: HOSPADM

## 2021-11-10 RX ORDER — SODIUM CHLORIDE AND POTASSIUM CHLORIDE 150; 900 MG/100ML; MG/100ML
INJECTION, SOLUTION INTRAVENOUS CONTINUOUS
Status: DISCONTINUED | OUTPATIENT
Start: 2021-11-10 | End: 2021-11-12

## 2021-11-10 RX ORDER — PROCHLORPERAZINE MALEATE 5 MG
5 TABLET ORAL EVERY 6 HOURS PRN
Status: DISCONTINUED | OUTPATIENT
Start: 2021-11-10 | End: 2021-11-13 | Stop reason: HOSPADM

## 2021-11-10 RX ORDER — PROCHLORPERAZINE 25 MG
12.5 SUPPOSITORY, RECTAL RECTAL EVERY 12 HOURS PRN
Status: DISCONTINUED | OUTPATIENT
Start: 2021-11-10 | End: 2021-11-13 | Stop reason: HOSPADM

## 2021-11-10 RX ORDER — ONDANSETRON 4 MG/1
4 TABLET, ORALLY DISINTEGRATING ORAL EVERY 6 HOURS PRN
Status: DISCONTINUED | OUTPATIENT
Start: 2021-11-10 | End: 2021-11-13 | Stop reason: HOSPADM

## 2021-11-10 RX ORDER — ACETAMINOPHEN 650 MG/1
650 SUPPOSITORY RECTAL EVERY 6 HOURS PRN
Status: DISCONTINUED | OUTPATIENT
Start: 2021-11-10 | End: 2021-11-13 | Stop reason: HOSPADM

## 2021-11-10 RX ORDER — IRBESARTAN 150 MG/1
150 TABLET ORAL DAILY
Status: DISCONTINUED | OUTPATIENT
Start: 2021-11-10 | End: 2021-11-13 | Stop reason: HOSPADM

## 2021-11-10 RX ORDER — LIDOCAINE 40 MG/G
CREAM TOPICAL
Status: DISCONTINUED | OUTPATIENT
Start: 2021-11-10 | End: 2021-11-13 | Stop reason: HOSPADM

## 2021-11-10 RX ORDER — ASPIRIN 81 MG/1
81 TABLET ORAL DAILY
Status: DISCONTINUED | OUTPATIENT
Start: 2021-11-10 | End: 2021-11-13 | Stop reason: HOSPADM

## 2021-11-10 RX ORDER — ONDANSETRON 2 MG/ML
4 INJECTION INTRAMUSCULAR; INTRAVENOUS EVERY 6 HOURS PRN
Status: DISCONTINUED | OUTPATIENT
Start: 2021-11-10 | End: 2021-11-13 | Stop reason: HOSPADM

## 2021-11-10 RX ORDER — POTASSIUM CHLORIDE 1500 MG/1
40 TABLET, EXTENDED RELEASE ORAL ONCE
Status: COMPLETED | OUTPATIENT
Start: 2021-11-10 | End: 2021-11-10

## 2021-11-10 RX ADMIN — IRBESARTAN 150 MG: 150 TABLET ORAL at 10:24

## 2021-11-10 RX ADMIN — POTASSIUM CHLORIDE AND SODIUM CHLORIDE: 900; 150 INJECTION, SOLUTION INTRAVENOUS at 08:22

## 2021-11-10 RX ADMIN — GUAIFENESIN 10 ML: 100 SOLUTION ORAL at 10:16

## 2021-11-10 RX ADMIN — REMDESIVIR 200 MG: 100 INJECTION, POWDER, LYOPHILIZED, FOR SOLUTION INTRAVENOUS at 15:14

## 2021-11-10 RX ADMIN — ASPIRIN 81 MG: 81 TABLET, COATED ORAL at 10:16

## 2021-11-10 RX ADMIN — POTASSIUM CHLORIDE AND SODIUM CHLORIDE: 900; 150 INJECTION, SOLUTION INTRAVENOUS at 19:02

## 2021-11-10 RX ADMIN — POTASSIUM CHLORIDE 40 MEQ: 1500 TABLET, EXTENDED RELEASE ORAL at 08:24

## 2021-11-10 RX ADMIN — ENOXAPARIN SODIUM 40 MG: 40 INJECTION SUBCUTANEOUS at 19:49

## 2021-11-10 RX ADMIN — DEXAMETHASONE 6 MG: 2 TABLET ORAL at 12:57

## 2021-11-10 RX ADMIN — SODIUM CHLORIDE 50 ML: 9 INJECTION, SOLUTION INTRAVENOUS at 15:16

## 2021-11-10 RX ADMIN — DEXAMETHASONE SODIUM PHOSPHATE 6 MG: 10 INJECTION INTRAMUSCULAR; INTRAVENOUS at 00:08

## 2021-11-10 RX ADMIN — SODIUM CHLORIDE, POTASSIUM CHLORIDE, SODIUM LACTATE AND CALCIUM CHLORIDE 500 ML: 600; 310; 30; 20 INJECTION, SOLUTION INTRAVENOUS at 00:11

## 2021-11-10 RX ADMIN — GUAIFENESIN 10 ML: 100 SOLUTION ORAL at 23:24

## 2021-11-10 RX ADMIN — DILTIAZEM HYDROCHLORIDE 240 MG: 120 CAPSULE, COATED, EXTENDED RELEASE ORAL at 19:49

## 2021-11-10 RX ADMIN — ENOXAPARIN SODIUM 40 MG: 40 INJECTION SUBCUTANEOUS at 10:16

## 2021-11-10 ASSESSMENT — ACTIVITIES OF DAILY LIVING (ADL)
ADLS_ACUITY_SCORE: 6
ADLS_ACUITY_SCORE: 14
ADLS_ACUITY_SCORE: 6
ADLS_ACUITY_SCORE: 14
ADLS_ACUITY_SCORE: 6
ADLS_ACUITY_SCORE: 12
ADLS_ACUITY_SCORE: 14
ADLS_ACUITY_SCORE: 6
ADLS_ACUITY_SCORE: 12
ADLS_ACUITY_SCORE: 12
ADLS_ACUITY_SCORE: 14
ADLS_ACUITY_SCORE: 6
ADLS_ACUITY_SCORE: 12
ADLS_ACUITY_SCORE: 12
ADLS_ACUITY_SCORE: 6
ADLS_ACUITY_SCORE: 12
ADLS_ACUITY_SCORE: 14
ADLS_ACUITY_SCORE: 12
ADLS_ACUITY_SCORE: 12

## 2021-11-10 ASSESSMENT — MIFFLIN-ST. JEOR: SCORE: 1169.06

## 2021-11-10 NOTE — PHARMACY-ADMISSION MEDICATION HISTORY
Admission medication history interview status for this patient is complete. See [facility] admission navigator for allergy information, prior to admission medications and immunization status.     Medication history interview done, indicate source(s): Patient  Medication history resources (including written lists, pill bottles, clinic record):None  Pharmacy: [facility]    Changes made to PTA medication list:  Added: none  Changed: diltiazem from daily to qpm, fish oil + D3 from 2 capsules daily to 1 daily, garlic from daily to BID, vitamin D from daily to 1 tablet BID  Reported as Not Taking: none  Removed: Golo Release supplement    Actions taken by pharmacist (provider contacted, etc):None     Additional medication history information:Patient states that a medication for nausea was prescribed last night but she couldn't recall the name. From chart review, the medication may have been Zofran.     Medication reconciliation/reorder completed by provider prior to medication history?  Y   (Y/N)       Prior to Admission medications    Medication Sig Last Dose Taking? Auth Provider   Ascorbic Acid (VITAMIN C PO) Take 1 tablet by mouth daily  Past Week at Unknown time Yes Reported, Patient   aspirin 81 MG EC tablet Take 81 mg by mouth daily 11/9/2021 at Unknown time Yes Reported, Patient   co-enzyme Q-10 100 MG CAPS capsule Take 100 mg by mouth daily Past Week at Unknown time Yes Reported, Patient   diltiazem ER COATED BEADS (CARDIZEM CD) 240 MG 24 hr capsule Take 1 capsule (240 mg) by mouth daily  Patient taking differently: Take 240 mg by mouth every evening  11/9/2021 at pm Yes Juanis Davison MD   Fish Oil-Cholecalciferol (FISH OIL + D3) 0279-4301 MG-UNIT CAPS Take 1 capsule by mouth daily  Past Week at Unknown time Yes Reported, Patient   GARLIC PO Take 1 tablet by mouth 2 times daily  Past Week at Unknown time Yes Reported, Patient   irbesartan (AVAPRO) 150 MG tablet Take 1 tablet (150 mg) by mouth daily  11/9/2021 at Unknown time Yes Juanis Davison MD   Red Yeast Rice Extract 600 MG CAPS Take 600 mg by mouth daily Past Week at Unknown time Yes Reported, Patient   VITAMIN D PO Take 1 tablet by mouth 2 times daily  11/9/2021 at Unknown time Yes Reported, Patient   nitroGLYcerin (NITROSTAT) 0.4 MG sublingual tablet For chest pain place 1 tablet under the tongue every 5 minutes for 3 doses. If symptoms persist 5 minutes after 1st dose call 911.   Juanis Davison MD

## 2021-11-10 NOTE — PROGRESS NOTES
Clinic Care Coordination Contact    Background: Care Coordination referral placed from [facility] discharge report for reason of patient meeting criteria for a TCM outreach call by [facility] team.    Assessment: Upon chart review, [facility] Team member will cancel/close the referral for TCM outreach due to reason below:    Patient has presented to Emergency Department or has been readmitted to hospital.    Plan: Care Coordination referral for TCM outreach canceled.    Latosha Major  Community Health Worker  [facility]  [phone]

## 2021-11-10 NOTE — ED TRIAGE NOTES
Pt states she tested positive for covid on 11/4 but has been sick since 10/30.  States she has been more SOB tonight and home pulse ox showed O2 level of 88.  States she doesn't want steroids she just wants home O2.   Patient also dry heaving in triage

## 2021-11-10 NOTE — PROGRESS NOTES
Brief Hospitalist Progress Note  11/10/2021    Shavonne Lundberg is a 71-year-old female with past medical history significant for hypertension, hyperlipidemia, tachycardia, and suspected JEROMY who presented on 11/10/2021 with worsening symptoms of cough, shortness of breath, and measurable hypoxia on home.  Patient was previously admitted on 11/7/2021 for 1 night observation stay when she was diagnosed with COVID-19.  She was discharged home at that time.  She is now feeling more short of breath and tachypneic, requiring 2 L of oxygen via nasal cannula.  On admission patient agreed to Dexamethasone treatment, but was hesitant about Remdesivir.  This morning, however, she is amenable to beginning this therapy as well.  Also repeating BMP to follow-up on hyponatremia on admission.    Anurag Canales MD  Hospitalist

## 2021-11-10 NOTE — ED NOTES
RECEIVING UNIT ED HANDOFF REVIEW    Above ED Nurse Handoff Report was reviewed: Yes  Reviewed by: Sharita Ann RN on November 10, 2021 at 2:53 PM   [facility]  ED Nurse Handoff Report    Shavonne Lundberg is a 71 year old female   ED Chief complaint: Shortness of Breath  . ED Diagnosis:   Final diagnoses:   Pneumonia due to 2019 novel coronavirus   Acute respiratory failure with hypoxia (H)     Allergies:   Allergies   Allergen Reactions     Sulfa Drugs Anaphylaxis       Code Status: Full Code  Activity level - Baseline/Home:  Independent. Activity Level - Current:   Stand by Assist. Lift room needed: No. Bariatric: No   Needed: No   Isolation: Yes. Infection: Not Applicable  COVID r/o and special precautions.     Vital Signs:   Vitals:    11/09/21 2307   BP: (!) 142/91   Pulse: 102   Resp: 28   Temp: 97.4  F (36.3  C)   TempSrc: Temporal   SpO2: 92%       Cardiac Rhythm:  ,      Pain level:    Patient confused: No. Patient Falls Risk: No.   Elimination Status: Has voided   Patient Report - Initial Complaint: Pt states she tested positive for covid on 11/4 but has been sick since 10/30.  States she has been more SOB tonight and home pulse ox showed O2 level of 88.  States she doesn't want steroids she just wants home O2.   Patient also dry heaving in triage. Focused Assessment: Respiratory - Respiratory WDL: .WDL except; breath sounds; cough; effort/expansion; rhythm/pattern  Rhythm/Pattern, Respiratory: dyspnea on exertion  Breath Sounds: All Fields  Cough Frequency: frequent  Cough Type: dry; nonproductive   Head To Toe Assessment - All Lung Fields Breath Sounds: crackles    Tests Performed: labs and imaging. Abnormal Results:   Abnormal Labs Resulted from Time of ED Arrival to Time of ED Departure   COMPREHENSIVE METABOLIC PANEL - Abnormal       Result Value    Sodium 132 (*)     Potassium 3.3 (*)     Chloride 95      Carbon Dioxide (CO2) 32      Anion Gap 5      Urea Nitrogen 6 (*)      Creatinine 0.51 (*)     Calcium 8.4 (*)     Glucose 138 (*)     Alkaline Phosphatase 82      AST 34      ALT 34      Protein Total 7.1      Albumin 3.1 (*)     Bilirubin Total 0.4      GFR Estimate >90     BLOOD GAS VENOUS - Abnormal    pH Venous 7.50 (*)     pCO2 Venous 43      pO2 Venous 29      Bicarbonate Venous 33 (*)     Base Excess/Deficit (+/-) 9.1 (*)     FIO2 21     CRP INFLAMMATION - Abnormal    CRP Inflammation 20.9 (*)    CBC WITH PLATELETS AND DIFFERENTIAL - Abnormal    WBC Count 5.8      RBC Count 4.70      Hemoglobin 14.4      Hematocrit 43.0      MCV 92      MCH 30.6      MCHC 33.5      RDW 11.9      Platelet Count 233      % Neutrophils 79      % Lymphocytes 11      % Monocytes 10      % Eosinophils 0      % Basophils 0      % Immature Granulocytes 0      NRBCs per 100 WBC 0      Absolute Neutrophils 4.5      Absolute Lymphocytes 0.7 (*)     Absolute Monocytes 0.6      Absolute Eosinophils 0.0      Absolute Basophils 0.0      Absolute Immature Granulocytes 0.0      Absolute NRBCs 0.0       XR Chest Port 1 View   Final Result   IMPRESSION: Stable cardiomediastinal silhouette. Mild persistent basilar interstitial infiltrates. No vascular congestion or significant effusion. Atherosclerotic aorta. Degenerative change osseous structures.           Treatments provided: see MAR, on 2L NC  Family Comments: not at bedside  OBS brochure/video discussed/provided to patient:  N/A  ED Medications:   Medications   sodium chloride (PF) 0.9% PF flush 3 mL (has no administration in time range)   sodium chloride (PF) 0.9% PF flush 3 mL (3 mLs Intracatheter Not Given 11/10/21 0043)   lactated ringers BOLUS 500 mL (500 mLs Intravenous New Bag 11/10/21 0011)   dexamethasone PF (DECADRON) injection 6 mg (6 mg Intravenous Given 11/10/21 0008)     Drips infusing:  No  For the majority of the shift, the patient's behavior Green. Interventions performed were n/a.    Sepsis treatment initiated: No     Patient tested for  COVID 19 prior to admission: NO    ED Nurse Name/Phone Number: Anju Strauss RN,   12:48 AM

## 2021-11-10 NOTE — PROGRESS NOTES
Patient alert and oriented  Peripheral IV infusing IVF  Decadron, O2, remdesivir, lovenox started  Denies pain  Tolerating diet, denies nausea  Cough, PRN robitussin given once  VSS, on 2L via NC  Up independently in room  Report called to accepting RN  Transferred to MS5 for continued cares

## 2021-11-10 NOTE — TELEPHONE ENCOUNTER
Called the pt.  She is back in the hospital.  Advised to call us once she gets out and we can help her set up an appointment.

## 2021-11-10 NOTE — H&P
[facility]  Hospitalist Admission Note  Name: Shavonne Lundberg    MRN: [id number]  YOB: 1950    Age: 71 year old  Date of admission: 11/9/2021  Primary care provider: Juanis Davison    Chief Complaint: Shortness of breath    Assessment and Plan:   Acute hypoxemic respiratory failure secondary to COVID-19 pneumonia: Patient is not vaccinated for COVID-19.  Admitted overnight on 11/7 for COVID-19 symptoms, but was not hypoxic with ambulation.  Symptom onset 11/1.  Suspected underlying sleep apnea resulting in hypoxemia while here when sleeping that rapidly improved when awake.  Progressive shortness of breath over the past 2 days and cough with O2 sats low 80s when walking on oximeter.  In the ER hypoxic to high 80s when talking.  Chest x-ray shows mild persistent basilar infiltrates.  CRP 20.9 in the ER, D-dimer pending.    -Start dexamethasone 6 mg p.o. daily   -discussed risk and benefit of IV Remdesevir.  She would like to think about this for now so did not order  -Continuous pulse ox, supplemental oxygen as needed  -Trend CRP/D-dimer  -Tessalon Perles and Robitussin as needed for cough  -Lovenox for DVT prophylaxis    HTN: PTA on irbesartan 150 mg daily and also diltiazem  mg daily, but she says this is for tachycardia.  I asked her if this was for A. fib and she said no, tachycardia.  Care everywhere just says cardiac dysrhythmia.  -Resume irbesartan and diltiazem  -Resume her PTA aspirin 81 mg daily    Hyponatremia: Sodium 132 in the ER.  Having diarrhea and decreased p.o. intake as the etiology.  -IV NS with 20 meq K at 100 ml/hr  -Repeat BMP in the morning    Hypokalemia: Potassium 3.3.  Having diarrhea and decreased p.o. intake as the etiology.  -Potassium replacement protocol    Suspected JEROMY: Noted to have hypoxia whenever sleeping while in the hospital here.  In the past did not complete sleep study due to machine alarms going off per her recollection.  Likely  has undiagnosed sleep apnea.  -She will be on supplemental oxygen here for respiratory failure from COVID-19  -Recommend outpatient sleep study    DVT Prophylaxis: Enoxaparin (Lovenox) SQ  Code Status: Full Code  FEN: Regular diet, NS with 20 meq K at 100 ml/hr  Discharge Dispo: Home  Estimated Disch Date / # of Days until Disch: Admit inpatient for hypoxemic respiratory failure secondary to COVID-19 pneumonia.  Anticipate at least 2 night hospitalization.      History of Present Illness:  Shavonne Lundberg is a 71 year old female with PMH including HTN, HLD, tachycardia?  On diltiazem, and suspected JEROMY who was admitted overnight on 11/7 for COVID-19, but was discharged the next day as she was not hypoxic with ambulation.  Symptoms started on 11/1 primarily cough, nausea, vomiting, and some left arm pain.  While admitted here she was hypoxic only when sleeping which was due to suspected sleep apnea.  Did not finish previous sleep studies due to alarms going off.  She did not receive any steroids or Remdesevir due to not having low oxygen.  She went home and since then she has had progressive shortness of breath especially with exertion.  She continues to cough frequently, it is nonproductive.  Her oxygen is dropping to the low 80s with ambulation so she came back in for evaluation.  She continues to have intermittent fevers at home along with some nausea.  She has had diarrhea as well, denies any abdominal pain.  She is not vaccinated for COVID-19.    History obtained from patient, medical record, and from Dr. Vogel in the emergency department.  Blood pressure 142/91, heart rate 102, temperature 97.4  F, oxygen was 92% at rest, but dipping down to the high 80s when just talking in the room.  Sodium low at 132 and potassium is 3.3 otherwise BMP within normal limits.  Albumin low at 3.1 otherwise LFTs normal.  CRP up to 20.9 from 11.4.  Troponin undetectable.  Procalcitonin undetectable.  Lactic acid normal at  1.1.  CBC within normal limits.  VBG shows pH 7.50, PCO2 43, PO2 29, HCO3 33.  Chest x-ray shows persistent bibasilar infiltrates.  EKG shows NSR.  She received 6 mg of IV dexamethasone and 500 mL LR.  She will be admitted as an inpatient for hypoxia secondary to COVID-19 pneumonia.     Past Medical History reviewed:  Past Medical History:   Diagnosis Date     HTN (hypertension)      Hyperlipidemia      Infection due to  novel coronavirus 2021    NOT vaccinated at time of diagnosis   Suspected JEROMY  Tachycardia    Past Surgical History reviewed:  Past Surgical History:   Procedure Laterality Date     C/SECTION, LOW TRANSVERSE      x2     HERNIA REPAIR       Social History reviewed:  Social History     Tobacco Use     Smoking status: Former Smoker     Smokeless tobacco: Never Used     Tobacco comment: quit in    Substance Use Topics     Alcohol use: No     Alcohol/week: 0.0 standard drinks     Social History     Social History Narrative     Not on file     Family History reviewed:  Family History   Problem Relation Age of Onset     Lung Cancer Father      Lung Cancer Brother      Arrhythmia Brother      Arrhythmia Brother      Coronary Artery Disease Brother         MI @55 and      Coronary Artery Disease Brother         MI @55 and  @76     Coronary Artery Disease Brother         MI @54 and      Allergies:  Allergies   Allergen Reactions     Sulfa Drugs Anaphylaxis     Medications:  Prior to Admission medications    Medication Sig Last Dose Taking? Auth Provider   Ascorbic Acid (VITAMIN C PO) Take 1,000 mg by mouth daily    Reported, Patient   aspirin 81 MG EC tablet Take 81 mg by mouth daily   Reported, Patient   co-enzyme Q-10 100 MG CAPS capsule Take 100 mg by mouth daily   Reported, Patient   diltiazem ER COATED BEADS (CARDIZEM CD) 240 MG 24 hr capsule Take 1 capsule (240 mg) by mouth daily   Juanis Davison MD   Fish Oil-Cholecalciferol (FISH OIL + D3) 8503-6311 MG-UNIT CAPS Take 2  capsules by mouth daily   Reported, Patient   Garlic 1000 MG CAPS Take 1,000 mg by mouth daily    Reported, Patient   irbesartan (AVAPRO) 150 MG tablet Take 1 tablet (150 mg) by mouth daily   Juanis Davison MD   nitroGLYcerin (NITROSTAT) 0.4 MG sublingual tablet For chest pain place 1 tablet under the tongue every 5 minutes for 3 doses. If symptoms persist 5 minutes after 1st dose call 911.   Juanis Davison MD   Red Yeast Rice Extract 600 MG CAPS Take 600 mg by mouth daily   Reported, Patient   UNABLE TO FIND daily MEDICATION NAME: GOLO  Release dietary supplement   Reported, Patient   vitamin D3 (CHOLECALCIFEROL) 2000 units tablet Take 1 tablet by mouth daily   Reported, Patient     Review of Systems:  A Comprehensive greater than 10 system review of systems was carried out.  Pertinent positives and negatives are noted above.  Otherwise negative.     Physical Exam:  Blood pressure (!) 142/91, pulse 102, temperature 97.4  F (36.3  C), temperature source Temporal, resp. rate 28, SpO2 92 %, not currently breastfeeding.  Wt Readings from Last 1 Encounters:   11/07/21 69.4 kg (153 lb)     Exam:  Constitutional: Awake, NAD   Eyes: sclera white   HEENT: atraumatic, MMM  Respiratory: Bilateral crackles present, no wheeze, appears comfortable on 2 L via nasal cannula  Cardiovascular: RRR.  No murmur   GI: non-tender, not distended, bowel sounds present  Skin: no rash or lesions, acyanotic  Musculoskeletal/extremities: atraumatic, no major deformities. No lower extremity edema  Neurologic: A&O, speech clear, mild diffuse tremor, moves extremities equally  Psychiatric: Anxious, but cooperative    Lab and imaging data personally reviewed:  Labs:  Recent Labs   Lab 11/09/21 2328   PHV 7.50*   PO2V 29   PCO2V 43   HCO3V 33*     Recent Labs   Lab 11/09/21  2328 11/08/21  1007 11/07/21  1212   WBC 5.8 2.7* 2.2*   HGB 14.4 13.0 12.6   HCT 43.0 41.1 39.4   MCV 92 95 94    188 165     Recent Labs   Lab  11/09/21 2328 11/08/21  1007 11/07/21  2115 11/07/21  1644 11/07/21  1212   * 135  --   --  133   POTASSIUM 3.3* 3.5  3.5 4.1  --  3.3*   CHLORIDE 95 100  --   --  96   CO2 32 29  --   --  31   ANIONGAP 5 6  --   --  6   * 135*  --   --  99   BUN 6* 9  --   --  6*   CR 0.51* 0.43*  --  0.46* 0.38*   GFRESTIMATED >90 >90  --  >90 >90   SOFI 8.4* 8.1*  --   --  7.8*   MAG 2.1  --   --   --   --    PROTTOTAL 7.1 6.4*  --   --  6.4*   ALBUMIN 3.1* 2.8*  --   --  2.8*   BILITOTAL 0.4 0.4  --   --  0.4   ALKPHOS 82 72  --   --  72   AST 34 25  --   --  24   ALT 34 28  --   --  25     Recent Labs   Lab 11/09/21 2328   LACT 1.1     Recent Labs   Lab 11/09/21 2328 11/07/21  1212   TROPONIN <0.015 <0.015     Procalcitonin undetectable    EKG: NSR, no ST elevation or depression    Imaging:  Recent Results (from the past 24 hour(s))   XR Chest Port 1 View    Narrative    EXAM: XR CHEST PORT 1 VIEW  LOCATION: [facility]  DATE/TIME: 11/9/2021 11:50 PM    INDICATION: Dyspnea/SOB  COMPARISON: 11/07/2021.      Impression    IMPRESSION: Stable cardiomediastinal silhouette. Mild persistent basilar interstitial infiltrates. No vascular congestion or significant effusion. Atherosclerotic aorta. Degenerative change osseous structures.       Misael Trent MD  Hospitalist  [facility]

## 2021-11-10 NOTE — ED PROVIDER NOTES
History     Chief Complaint:  Shortness of Breath    HPI   Shavonne Lundberg is a 71 year old female with history of hypertension and hyperlipidemia who presents with shortness of breath. Per the patient, on 10/30 she developed symptoms of headache and fever. She tested positive for COVID on  and was hospitalized on  for a day with acute respiratory failure and pneumonia, and discharged home with a pulse oximeter. Since then, she has had shortness of breath and been hypoxic, with oxygen readings in the low 80s when walking. She is not COVID vaccinated.    XR Chest Port 1 View 21  Patchy bibasilar opacities may be atypical pneumonia. Normal cardiac silhouette. No effusion or pneumothorax.    D Dimer 21  0.44    Review of Systems   Constitutional: Positive for fever.   Respiratory: Positive for shortness of breath.    Neurological: Positive for headaches.   All other systems reviewed and are negative.    Allergies:  Sulfa Drugs    Medications:  Irbesartan  Nitroglycerin  Aspirin 81 MG    Past Medical History:     Hypertension  Hyperlipidemia  COVID-19 pneumonia    Past Surgical History:     x2  Unspecified hernia repair    Family History:    Father: Lung cancer  Brother: Lung cancer, arrhythmia, MI, CAD    Social History:  The patient was unaccompanied to the ER  PCP: Juanis Davison    Physical Exam     Patient Vitals for the past 24 hrs:   BP Temp Temp src Pulse Resp SpO2   21 2307 142/91 97.4  F (36.3  C) Temporal 102 28 92 %     Physical Exam  Nursing note and vitals reviewed.  Constitutional: Cooperative. Tired appearing.  HENT:   Mouth/Throat: Mucous membranes are dry  Cardiovascular: Tachycardic, regular rhythm and normal heart sounds.  No murmur.  Pulmonary/Chest: Effort normal. Diffuse crackles throughout lower lung fields. No respiratory distress. No wheezes.   Abdominal: Soft. Normal appearance and bowel sounds are normal. No distension. There is no tenderness.    Musculoskeletal: No LE edema  Neurological: Alert. Oriented x4.  Skin: Skin is warm and dry.   Psychiatric: Normal mood and affect.     Emergency Department Course     ECG  ECG obtained at 2355, ECG read at 0002  Normal sinus rhythm  Normal ECG   Rate 78 bpm. NV interval 146 ms. QRS duration 88 ms. QT/QTc 376/428 ms. P-R-T axes 43 19 57.     Imaging:    XR Chest Port 1 View   Final Result   IMPRESSION: Stable cardiomediastinal silhouette. Mild persistent basilar interstitial infiltrates. No vascular congestion or significant effusion. Atherosclerotic aorta. Degenerative change osseous structures.        The above imaging workup was performed.   Report per radiology    Laboratory:  Labs Ordered and Resulted from Time of ED Arrival to Time of ED Departure   COMPREHENSIVE METABOLIC PANEL - Abnormal       Result Value    Sodium 132 (*)     Potassium 3.3 (*)     Chloride 95      Carbon Dioxide (CO2) 32      Anion Gap 5      Urea Nitrogen 6 (*)     Creatinine 0.51 (*)     Calcium 8.4 (*)     Glucose 138 (*)     Alkaline Phosphatase 82      AST 34      ALT 34      Protein Total 7.1      Albumin 3.1 (*)     Bilirubin Total 0.4      GFR Estimate >90     BLOOD GAS VENOUS - Abnormal    pH Venous 7.50 (*)     pCO2 Venous 43      pO2 Venous 29      Bicarbonate Venous 33 (*)     Base Excess/Deficit (+/-) 9.1 (*)     FIO2 21     CRP INFLAMMATION - Abnormal    CRP Inflammation 20.9 (*)    CBC WITH PLATELETS AND DIFFERENTIAL - Abnormal    WBC Count 5.8      RBC Count 4.70      Hemoglobin 14.4      Hematocrit 43.0      MCV 92      MCH 30.6      MCHC 33.5      RDW 11.9      Platelet Count 233      % Neutrophils 79      % Lymphocytes 11      % Monocytes 10      % Eosinophils 0      % Basophils 0      % Immature Granulocytes 0      NRBCs per 100 WBC 0      Absolute Neutrophils 4.5      Absolute Lymphocytes 0.7 (*)     Absolute Monocytes 0.6      Absolute Eosinophils 0.0      Absolute Basophils 0.0      Absolute Immature Granulocytes  0.0      Absolute NRBCs 0.0     MAGNESIUM - Normal    Magnesium 2.1     LACTIC ACID WHOLE BLOOD - Normal    Lactic Acid 1.1     TROPONIN I - Normal    Troponin I <0.015     PROCALCITONIN   BLOOD CULTURE   BLOOD CULTURE      D Dimer: pending    Reviewed:  I reviewed nursing notes, vitals and past medical history    Assessments:  2315 I obtained history and examined the patient as noted above.   0104 I rechecked the patient and explained findings.     Consults:  0049 I spoke with Dr. Trent of the hospitalist service from [facility] regarding patient's presentation, findings, and plan of care.    Interventions:  0008: Decadron, 6 mg, IV  0011: Lactated Ringers, 500 mL, IV bolus    Disposition:  The patient was admitted to the hospital under the care of Dr. Trent.     Impression & Plan     Medical Decision Making:  Shavonne Lundberg is a 71 year old female with known COVID pneumonia currently not vaccinated who presents with worsening hypoxia at home. Chest xray shows worsening pulmonary infiltrates. She is doing well on 2L nasal cannula oxygen supplementation. Dimer from the 7th of this month was reviewed and negative making pulmonary embolism unlikely. After consulting with hospitalist, will order dimer here to trend. Troponin is negative here. We will administer Decadron given the hypoxia. Blood cultures sent. No antibiotics indicated at this time. Procalcitonin also pending and she will be admitted in stable condition.    Diagnosis:    ICD-10-CM    1. Pneumonia due to 2019 novel coronavirus  U07.1     J12.82    2. Acute respiratory failure with hypoxia  J96.01      Scribe Disclosure:  I, Melquiades Brink, am serving as a scribe at 11:19 PM on 11/9/2021 to document services personally performed by Martin Vogel MD based on my observations and the provider's statements to me.        Martin Vogel MD  11/10/21 0115

## 2021-11-10 NOTE — TELEPHONE ENCOUNTER
Pt is calling.    Diagnosed with COVID on 11/06/2021.  Feeling very short of breath at rest and with activity. Chest tightness. Denies chest pain or wheezing. Oxygen saturations are dropping down to 85% at the lowest. Oxygen is 89-90% at rest and dips lower with activity, down to 85-88%. Feels very dizzy when up and around.   Heart rate is 88 at rest now.  Goes up to 100 with activity.    I advised her that she needs to be seen back in the ED now.  Pt verbalized understanding and will have her daughter take her now.      COVID 19 Nurse Triage Plan/Patient Instructions    Please be aware that novel coronavirus (COVID-19) may be circulating in the community. If you develop symptoms such as fever, cough, or SOB or if you have concerns about the presence of another infection including coronavirus (COVID-19), please contact your health care provider or visit [email or web address].     Disposition/Instructions    ED Visit recommended. Follow protocol based instructions.     Bring Your Own Device:  Please also bring your smart device(s) (smart phones, tablets, laptops) and their charging cables for your personal use and to communicate with your care team during your visit.    Thank you for taking steps to prevent the spread of this virus.  o Limit your contact with others.  o Wear a simple mask to cover your cough.  o Wash your hands well and often.    Resources    [facility]: About COVID-19: [email or web address]    CDC: What to Do If You're Sick: www.cdc.gov/coronavirus/2019-ncov/about/steps-when-sick.html    CDC: Ending Home Isolation: www.cdc.gov/coronavirus/2019-ncov/hcp/disposition-in-home-patients.html     CDC: Caring for Someone: www.cdc.gov/coronavirus/2019-ncov/if-you-are-sick/care-for-someone.html     [facility]: Interim Guidance for Hospital Discharge to Home: www.health.[facility].[place]/diseases/coronavirus/hcp/hospdischarge.pdf    [facility] clinical trials (COVID-19 research  studies): [email or web address]     Below are the COVID-19 hotlines at the [place] [facility] ([facility]). Interpreters are available.   o For health questions: Call 449-179-4662 or 1-285.734.6740 (7 a.m. to 7 p.m.)  o For questions about schools and childcare: Call 006-317-2214 or 1-975.650.9672 (7 a.m. to 7 p.m.)     Reason for Disposition    MODERATE difficulty breathing (e.g., speaks in phrases, SOB even at rest, pulse 100-120)    Additional Information    Negative: SEVERE difficulty breathing (e.g., struggling for each breath, speaks in single words)    Negative: Difficult to awaken or acting confused (e.g., disoriented, slurred speech)    Negative: Bluish (or gray) lips or face now    Negative: Shock suspected (e.g., cold/pale/clammy skin, too weak to stand, low BP, rapid pulse)    Negative: Sounds like a life-threatening emergency to the triager    Negative: [1] COVID-19 exposure AND [2] no symptoms    Negative: COVID-19 vaccine reaction suspected (e.g., fever, headache, muscle aches) occurring 1 to 3 days after getting vaccine    Negative: COVID-19 vaccine, questions about    Negative: [1] Lives with someone known to have influenza (flu test positive) AND [2] flu-like symptoms (e.g., cough, runny nose, sore throat, SOB; with or without fever)    Negative: [1] Adult with possible COVID-19 symptoms AND [2] triager concerned about severity of symptoms or other causes    Negative: COVID-19 and breastfeeding, questions about    Negative: SEVERE or constant chest pain or pressure (Exception: mild central chest pain, present only when coughing)    Protocols used: CORONAVIRUS (COVID-19) DIAGNOSED OR [id number] 8.25.2021    Marina Adams RN  [facility] Nurse Advisor  11/9/2021 at 10:22 PM

## 2021-11-11 LAB
ANION GAP SERPL CALCULATED.3IONS-SCNC: 3 MMOL/L (ref 3–14)
BUN SERPL-MCNC: 10 MG/DL (ref 7–30)
CALCIUM SERPL-MCNC: 7.9 MG/DL (ref 8.5–10.1)
CHLORIDE BLD-SCNC: 110 MMOL/L (ref 94–109)
CO2 SERPL-SCNC: 29 MMOL/L (ref 20–32)
CREAT SERPL-MCNC: 0.38 MG/DL (ref 0.52–1.04)
CRP SERPL-MCNC: 10.5 MG/L (ref 0–8)
D DIMER PPP FEU-MCNC: 0.27 UG/ML FEU (ref 0–0.5)
ERYTHROCYTE [DISTWIDTH] IN BLOOD BY AUTOMATED COUNT: 12.1 % (ref 10–15)
GFR SERPL CREATININE-BSD FRML MDRD: >90 ML/MIN/1.73M2
GLUCOSE BLD-MCNC: 122 MG/DL (ref 70–99)
HCT VFR BLD AUTO: 38.1 % (ref 35–47)
HGB BLD-MCNC: 11.8 G/DL (ref 11.7–15.7)
MCH RBC QN AUTO: 30 PG (ref 26.5–33)
MCHC RBC AUTO-ENTMCNC: 31 G/DL (ref 31.5–36.5)
MCV RBC AUTO: 97 FL (ref 78–100)
PLATELET # BLD AUTO: 243 10E3/UL (ref 150–450)
POTASSIUM BLD-SCNC: 4.6 MMOL/L (ref 3.4–5.3)
RBC # BLD AUTO: 3.93 10E6/UL (ref 3.8–5.2)
SODIUM SERPL-SCNC: 142 MMOL/L (ref 133–144)
WBC # BLD AUTO: 5.5 10E3/UL (ref 4–11)

## 2021-11-11 PROCEDURE — 250N000012 HC RX MED GY IP 250 OP 636 PS 637: Performed by: INTERNAL MEDICINE

## 2021-11-11 PROCEDURE — 250N000011 HC RX IP 250 OP 636: Performed by: INTERNAL MEDICINE

## 2021-11-11 PROCEDURE — 250N000009 HC RX 250: Performed by: STUDENT IN AN ORGANIZED HEALTH CARE EDUCATION/TRAINING PROGRAM

## 2021-11-11 PROCEDURE — 86140 C-REACTIVE PROTEIN: CPT | Performed by: INTERNAL MEDICINE

## 2021-11-11 PROCEDURE — 99231 SBSQ HOSP IP/OBS SF/LOW 25: CPT | Performed by: STUDENT IN AN ORGANIZED HEALTH CARE EDUCATION/TRAINING PROGRAM

## 2021-11-11 PROCEDURE — 85027 COMPLETE CBC AUTOMATED: CPT | Performed by: INTERNAL MEDICINE

## 2021-11-11 PROCEDURE — 258N000003 HC RX IP 258 OP 636: Performed by: STUDENT IN AN ORGANIZED HEALTH CARE EDUCATION/TRAINING PROGRAM

## 2021-11-11 PROCEDURE — 120N000001 HC R&B MED SURG/OB

## 2021-11-11 PROCEDURE — 36415 COLL VENOUS BLD VENIPUNCTURE: CPT | Performed by: INTERNAL MEDICINE

## 2021-11-11 PROCEDURE — 250N000013 HC RX MED GY IP 250 OP 250 PS 637: Performed by: INTERNAL MEDICINE

## 2021-11-11 PROCEDURE — 82374 ASSAY BLOOD CARBON DIOXIDE: CPT | Performed by: INTERNAL MEDICINE

## 2021-11-11 PROCEDURE — 85379 FIBRIN DEGRADATION QUANT: CPT | Performed by: INTERNAL MEDICINE

## 2021-11-11 RX ADMIN — DEXAMETHASONE 6 MG: 2 TABLET ORAL at 08:28

## 2021-11-11 RX ADMIN — BENZONATATE 100 MG: 100 CAPSULE ORAL at 16:57

## 2021-11-11 RX ADMIN — IRBESARTAN 150 MG: 150 TABLET ORAL at 08:28

## 2021-11-11 RX ADMIN — POTASSIUM CHLORIDE AND SODIUM CHLORIDE: 900; 150 INJECTION, SOLUTION INTRAVENOUS at 05:49

## 2021-11-11 RX ADMIN — POTASSIUM CHLORIDE AND SODIUM CHLORIDE: 900; 150 INJECTION, SOLUTION INTRAVENOUS at 17:00

## 2021-11-11 RX ADMIN — SODIUM CHLORIDE 50 ML: 9 INJECTION, SOLUTION INTRAVENOUS at 17:09

## 2021-11-11 RX ADMIN — ACETAMINOPHEN 650 MG: 325 TABLET, FILM COATED ORAL at 16:58

## 2021-11-11 RX ADMIN — ENOXAPARIN SODIUM 40 MG: 40 INJECTION SUBCUTANEOUS at 20:51

## 2021-11-11 RX ADMIN — ENOXAPARIN SODIUM 40 MG: 40 INJECTION SUBCUTANEOUS at 08:28

## 2021-11-11 RX ADMIN — BENZONATATE 100 MG: 100 CAPSULE ORAL at 05:08

## 2021-11-11 RX ADMIN — GUAIFENESIN 10 ML: 100 SOLUTION ORAL at 16:59

## 2021-11-11 RX ADMIN — REMDESIVIR 100 MG: 100 INJECTION, POWDER, LYOPHILIZED, FOR SOLUTION INTRAVENOUS at 17:01

## 2021-11-11 RX ADMIN — ASPIRIN 81 MG: 81 TABLET, COATED ORAL at 08:27

## 2021-11-11 RX ADMIN — DILTIAZEM HYDROCHLORIDE 240 MG: 120 CAPSULE, COATED, EXTENDED RELEASE ORAL at 20:50

## 2021-11-11 ASSESSMENT — ACTIVITIES OF DAILY LIVING (ADL)
ADLS_ACUITY_SCORE: 6

## 2021-11-11 NOTE — PROGRESS NOTES
[facility]    Medicine Progress Note - Hospitalist Service  Date of Admission:  11/9/2021    Assessment & Plan    Shavonne Lundberg is a 71-year-old female with past medical history significant for hypertension, hyperlipidemia, tachycardia, and suspected JEROMY who presented on 11/10/2021 with worsening symptoms of cough, shortness of breath, and measurable hypoxia on home in the setting of previously diagnosed COVID-19 infection.    Acute Hypoxic Respiratory Failure  COVID-19 Pneumonia  Symptom onset on 11/1/2021 with previous overnight admission 11/7/2021 for observation.  Patient subsequently discharged home with oximetry, and was noted to have increased work of breathing and desaturation to the low 80s prompting presentation to the emergency department on 11/09/2021.  Has since required 2 to 3 L via nasal cannula to maintain saturations in the low 90s.  Patient is also receiving dexamethasone and remdesivir.  She is quite anxious about discharging home with oxygen, worried that she will not have any help at home if she should become increasingly ill.  Discussion was had to continue watching patient in the hospital while on oxygen and discharged home once improved.  -Dexamethasone 6 mg x 10 days total  -Remdesivir x5 days total  -Continuous pulse oximetry  -Supplemental oxygen as needed  -Continue Tessalon Perles and Robitussin for cough as needed    Probable JEROMY  We will need outpatient sleep study.  Patient noted to have intermittent apnea while sleeping in the hospital.  On oxygen as needed currently.  Will need outpatient sleep study.    Hypertension: Continue PTA irbesartan, diltiazem, and aspirin.  Hyponatremia, resolved: Sodium initially 132 on on arrival, improved to 142 today.  Hypokalemia, resolved: Potassium 3.3 on admission, improved with replacement.     Diet: Combination Diet Regular Diet Adult    DVT Prophylaxis: Enoxaparin (Lovenox) SQ  De La Rosa Catheter: Not present  Central  Lines: None  Code Status: Full Code      Disposition Plan   Expected discharge: 11/15/2021   recommended to prior living arrangement once O2 use less than 2 liters/minute     The patient's care was discussed with the Bedside Nurse and Patient.    Anurag Canales MD  Hospitalist Service  [facility]  Securely message with the Vocera Web Console (learn more here)  Text page via [employer] Paging/Directory  ______________________________________________________________________    Interval History   Nursing notes reviewed; no acute events overnight.  Patient stated that she is feeling approximately the same today, noting that she is very tired and feels quite ill.  She does not feel quite as short of breath, but notes that she continues to have a very deep cough with deep inspiration.  She is hesitant to discharge home with oxygen, concerned that she will not be able to care for herself and will not have help at home since her entire family reportedly is COVID at this time.  She denies any chest pain, productive cough, nausea, vomiting, diarrhea, fevers, or chills.    Physical Exam   Vital Signs: Temp: 97.6  F (36.4  C) Temp src: Oral BP: (!) 143/59 Pulse: 67   Resp: 22 SpO2: 94 % O2 Device: Nasal cannula Oxygen Delivery: 2 LPM  Weight: 151 lbs 0 oz     General: Pleasant elderly female resting comfortably in hospital bed.  Malaised.  HEENT: Normocephalic, atraumatic.  PERRL, EOMI.  Conjunctiva clear, sclera anicteric.  Cardiac: Regular rate and rhythm without murmurs, gallops, rubs.  Respiratory: Normal work of breathing.  Nasal cannula in place.  Inspiratory crackles bibasilarly.  Cough after deep inspiration.  Abdomen: Normal active bowel sounds.  Soft, nontender, nondistended.  Musculoskeletal: Moving all extremities appropriately.  Skin: No rashes or abrasions on exposed skin.  Neurologic: Alert and oriented x4.  Cranial nerves II through XII grossly intact.  Psychologic: Appropriate mood and affect.   Slightly anxious.    Data    All laboratory results and other diagnostic data from the last 24 hours is available in Epic and has been personally reviewed.  Recent Labs   Lab 11/11/21  0639 11/10/21  1227 11/09/21  2328 11/08/21  1007 11/07/21  1644 11/07/21  1212   WBC 5.5  --  5.8 2.7*  --  2.2*   HGB 11.8  --  14.4 13.0  --  12.6   MCV 97  --  92 95  --  94     --  233 188  --  165    138 132* 135  --  133   POTASSIUM 4.6 4.7  4.6 3.3* 3.5  3.5   < > 3.3*   CHLORIDE 110* 103 95 100  --  96   CO2 29 29 32 29  --  31   BUN 10 9 6* 9  --  6*   CR 0.38* 0.40* 0.51* 0.43*   < > 0.38*   ANIONGAP 3 6 5 6  --  6   SOFI 7.9* 8.5 8.4* 8.1*  --  7.8*   * 136* 138* 135*  --  99   ALBUMIN  --  2.8* 3.1* 2.8*  --  2.8*   PROTTOTAL  --  6.8 7.1 6.4*  --  6.4*   BILITOTAL  --  0.4 0.4 0.4  --  0.4   ALKPHOS  --  76 82 72  --  72   ALT  --  29 34 28  --  25   AST  --  25 34 25  --  24   LIPASE  --   --   --   --   --  92   TROPONIN  --   --  <0.015  --   --  <0.015    < > = values in this interval not displayed.     No results found for this or any previous visit (from the past 24 hour(s)).

## 2021-11-11 NOTE — PROGRESS NOTES
End of Shift Summary  For vital signs and complete assessments, please see documentation flowsheets.     Pertinent assessments: Pt A&O, elevated BP's, currently on 2.5L NC, PATTERSON, LS diminished, clear. Productive cough, Robitussin x 1 given.     Major Shift Events: uneventful   Treatment Plan: IVF, oral Decadron, Remdesivir  Bedside Nurse: Christina Paige RN

## 2021-11-11 NOTE — PLAN OF CARE
To Do:  End of Shift Summary  For vital signs and complete assessments, please see documentation flowsheets.     Pertinent assessments: A&O--  desats to 85 % on RA nd activity --- 94-96%  O2 2L--Appetite good ---denies pain and nausea   Major Shift Events  transfer rooms   Treatment Plan:  monitor  Bedside Nurse: Sharita Ann RN

## 2021-11-11 NOTE — PLAN OF CARE
To Do:  End of Shift Summary  For vital signs and complete assessments, please see documentation flowsheets.     Pertinent assessments: A%O-- anxious --cough good - dry ----up in room sats on 2L 90-92 %--using IS well -- denies pain and nausea   Major Shift Events  O2 2L  Treatment Plan: monitor  Bedside Nurse: Sharita Ann RN

## 2021-11-11 NOTE — CONSULTS
Care Management  Note    Pt identified as a [facility] Pt.  She did readmit. No needs or assessment needed at this time. Please consult CM/SW  if discharge needs should arise.    Christina Alarcon RN BSN   Inpatient Care Coordination  [facility]  [phone]      Marguerite Alarcon, RN

## 2021-11-12 LAB
ALT SERPL W P-5'-P-CCNC: 26 U/L (ref 0–50)
ANION GAP SERPL CALCULATED.3IONS-SCNC: 1 MMOL/L (ref 3–14)
AST SERPL W P-5'-P-CCNC: 18 U/L (ref 0–45)
BUN SERPL-MCNC: 12 MG/DL (ref 7–30)
CALCIUM SERPL-MCNC: 7.9 MG/DL (ref 8.5–10.1)
CHLORIDE BLD-SCNC: 109 MMOL/L (ref 94–109)
CO2 SERPL-SCNC: 31 MMOL/L (ref 20–32)
CREAT SERPL-MCNC: 0.46 MG/DL (ref 0.52–1.04)
CRP SERPL-MCNC: 4.9 MG/L (ref 0–8)
D DIMER PPP FEU-MCNC: <0.27 UG/ML FEU (ref 0–0.5)
ERYTHROCYTE [DISTWIDTH] IN BLOOD BY AUTOMATED COUNT: 12.2 % (ref 10–15)
GFR SERPL CREATININE-BSD FRML MDRD: >90 ML/MIN/1.73M2
GLUCOSE BLD-MCNC: 116 MG/DL (ref 70–99)
HCT VFR BLD AUTO: 37.8 % (ref 35–47)
HGB BLD-MCNC: 11.7 G/DL (ref 11.7–15.7)
MCH RBC QN AUTO: 29.8 PG (ref 26.5–33)
MCHC RBC AUTO-ENTMCNC: 31 G/DL (ref 31.5–36.5)
MCV RBC AUTO: 96 FL (ref 78–100)
PLATELET # BLD AUTO: 299 10E3/UL (ref 150–450)
POTASSIUM BLD-SCNC: 4.5 MMOL/L (ref 3.4–5.3)
RBC # BLD AUTO: 3.92 10E6/UL (ref 3.8–5.2)
SODIUM SERPL-SCNC: 141 MMOL/L (ref 133–144)
WBC # BLD AUTO: 6.1 10E3/UL (ref 4–11)

## 2021-11-12 PROCEDURE — 250N000012 HC RX MED GY IP 250 OP 636 PS 637: Performed by: INTERNAL MEDICINE

## 2021-11-12 PROCEDURE — 86140 C-REACTIVE PROTEIN: CPT | Performed by: INTERNAL MEDICINE

## 2021-11-12 PROCEDURE — 250N000013 HC RX MED GY IP 250 OP 250 PS 637: Performed by: HOSPITALIST

## 2021-11-12 PROCEDURE — 36415 COLL VENOUS BLD VENIPUNCTURE: CPT | Performed by: INTERNAL MEDICINE

## 2021-11-12 PROCEDURE — 84450 TRANSFERASE (AST) (SGOT): CPT | Performed by: STUDENT IN AN ORGANIZED HEALTH CARE EDUCATION/TRAINING PROGRAM

## 2021-11-12 PROCEDURE — 99231 SBSQ HOSP IP/OBS SF/LOW 25: CPT | Performed by: STUDENT IN AN ORGANIZED HEALTH CARE EDUCATION/TRAINING PROGRAM

## 2021-11-12 PROCEDURE — 250N000011 HC RX IP 250 OP 636: Performed by: INTERNAL MEDICINE

## 2021-11-12 PROCEDURE — 84460 ALANINE AMINO (ALT) (SGPT): CPT | Performed by: STUDENT IN AN ORGANIZED HEALTH CARE EDUCATION/TRAINING PROGRAM

## 2021-11-12 PROCEDURE — 80048 BASIC METABOLIC PNL TOTAL CA: CPT | Performed by: INTERNAL MEDICINE

## 2021-11-12 PROCEDURE — 250N000013 HC RX MED GY IP 250 OP 250 PS 637: Performed by: INTERNAL MEDICINE

## 2021-11-12 PROCEDURE — 85027 COMPLETE CBC AUTOMATED: CPT | Performed by: INTERNAL MEDICINE

## 2021-11-12 PROCEDURE — 85379 FIBRIN DEGRADATION QUANT: CPT | Performed by: INTERNAL MEDICINE

## 2021-11-12 PROCEDURE — 120N000001 HC R&B MED SURG/OB

## 2021-11-12 RX ORDER — GUAIFENESIN/DEXTROMETHORPHAN 100-10MG/5
10 SYRUP ORAL EVERY 4 HOURS PRN
Status: DISCONTINUED | OUTPATIENT
Start: 2021-11-12 | End: 2021-11-13 | Stop reason: HOSPADM

## 2021-11-12 RX ADMIN — DILTIAZEM HYDROCHLORIDE 240 MG: 120 CAPSULE, COATED, EXTENDED RELEASE ORAL at 20:26

## 2021-11-12 RX ADMIN — DEXAMETHASONE 6 MG: 2 TABLET ORAL at 09:08

## 2021-11-12 RX ADMIN — POTASSIUM CHLORIDE AND SODIUM CHLORIDE: 900; 150 INJECTION, SOLUTION INTRAVENOUS at 04:13

## 2021-11-12 RX ADMIN — ENOXAPARIN SODIUM 40 MG: 40 INJECTION SUBCUTANEOUS at 20:26

## 2021-11-12 RX ADMIN — IRBESARTAN 150 MG: 150 TABLET ORAL at 09:07

## 2021-11-12 RX ADMIN — GUAIFENESIN AND DEXTROMETHORPHAN 10 ML: 100; 10 SYRUP ORAL at 20:26

## 2021-11-12 RX ADMIN — GUAIFENESIN 10 ML: 100 SOLUTION ORAL at 04:13

## 2021-11-12 RX ADMIN — ASPIRIN 81 MG: 81 TABLET, COATED ORAL at 09:07

## 2021-11-12 RX ADMIN — BENZONATATE 100 MG: 100 CAPSULE ORAL at 04:31

## 2021-11-12 RX ADMIN — ENOXAPARIN SODIUM 40 MG: 40 INJECTION SUBCUTANEOUS at 09:09

## 2021-11-12 ASSESSMENT — ACTIVITIES OF DAILY LIVING (ADL)
ADLS_ACUITY_SCORE: 6

## 2021-11-12 NOTE — PLAN OF CARE
End of Shift Summary  For vital signs and complete assessments, please see documentation flowsheets.     Pertinent assessments: AAOx4, VSS, on 2 LPM O2 via NC. Denies pain, PATTERSON, lungs coarse crackles in the bases. Persistent cough. Up ambulating independently in room and up in chair comfortably.   Major Shift Events: Remdesivir on hold due to pt's preference.   Treatment Plan: Decadron, Lovenox, IS, Supplemental O2    Bedside Nurse: Glendy Gonzalez RN    Weaned to 1 LPM O2 at 95%. requested an expectorant for her cough. Garrett BREAUX.

## 2021-11-12 NOTE — PROGRESS NOTES
[facility]    Medicine Progress Note - Hospitalist Service  Date of Admission:  11/9/2021    Assessment & Plan    Shavonne Lundberg is a 71-year-old female with past medical history significant for hypertension, hyperlipidemia, tachycardia, and suspected JEROMY who presented on 11/10/2021 with worsening symptoms of cough, shortness of breath, and measurable hypoxia on home in the setting of previously diagnosed COVID-19 infection.    Acute Hypoxic Respiratory Failure  COVID-19 Pneumonia  Symptom onset on 11/1/2021 with previous overnight admission 11/7/2021 for observation.  Patient subsequently discharged home with oximetry, and was noted to have increased work of breathing and desaturation to the low 80s prompting presentation to the emergency department on 11/09/2021.  Has since required 2 to 3 L via nasal cannula to maintain saturations in the low 90s.  Continuing Dexamethasone, but holding Remdesivir per patient's request.  Given her overall stability over the last several days requiring low levels of oxygen could consider discharge tomorrow with home oxygen if patient is amenable.  -Dexamethasone 6 mg x 10 days total  - d/cRemdesivir  -Continuous pulse oximetry  -Supplemental oxygen as needed  -Continue Tessalon Perles and Robitussin for cough as needed    Probable JEROMY  Will need outpatient sleep study.  Patient noted to have intermittent apnea while sleeping in the hospital.  On oxygen as needed currently.  Will need outpatient sleep study.    Hypertension: Continue PTA Irbesartan, Diltiazem, and Aspirin.  Hyponatremia, resolved: Sodium initially 132 on on arrival, improved to 141 today.  Hypokalemia, resolved: Potassium 3.3 on admission, improved with replacement.     Diet: Combination Diet Regular Diet Adult    DVT Prophylaxis: Enoxaparin (Lovenox) SQ  De La Rosa Catheter: Not present  Central Lines: None  Code Status: Full Code      Disposition Plan   Expected discharge: Likely tomorrow  pending stability of oxygen requirements   The patient's care was discussed with the Bedside Nurse and Patient.    Anurag Canales MD  Hospitalist Service  [facility]  Securely message with the [employer] Web Console (learn more here)  Text page via [employer] Paging/Directory  ______________________________________________________________________    Interval History   Nursing notes reviewed; no acute events overnight.  Patient is feeling better this morning, reporting that she can breathe a bit easier.  Noted that she is still having some coughing, but this is less from prior.  Denies any recurrent fevers or chills.  Does endorse that she is still quite fatigued and is concerned about discharging home and being able to care for herself.  Is reporting some right upper quadrant pain today and is quite concerned that this could potentially be from Remdesivir; requesting that Remdesivir be held.    Endorse that she feels quids advised that she became so ill from Covid A full 10+ point review of systems was performed and found to be negative with the exception of those items noted here.        Physical Exam   Vital Signs: Temp: 97.9  F (36.6  C) Temp src: Oral BP: 139/62 Pulse: 70   Resp: 16 SpO2: 95 % O2 Device: Nasal cannula Oxygen Delivery: 2 LPM  Weight: 151 lbs 0 oz     General: Pleasant elderly female resting comfortably in hospital bed.  Malaised.  HEENT: Normocephalic, atraumatic.  PERRL, EOMI.  Conjunctiva clear, sclera anicteric.  Cardiac: Regular rate and rhythm without murmurs, gallops, rubs.  Respiratory: Normal work of breathing.  Nasal cannula in place.  Inspiratory crackles bibasilarly.    Abdomen: Normal active bowel sounds.  Soft, nontender, nondistended.  Musculoskeletal: Moving all extremities appropriately.  Skin: No rashes or abrasions on exposed skin.  Neurologic: Alert and oriented x4.  Cranial nerves II through XII grossly intact.  Psychologic: Appropriate mood and affect.  Slightly  anxious.    Data    All laboratory results and other diagnostic data from the last 24 hours is available in Epic and has been personally reviewed.  Recent Labs   Lab 11/12/21  0544 11/11/21  0639 11/10/21  1227 11/09/21  2328 11/07/21  1644 11/07/21  1212   WBC 6.1 5.5  --  5.8   < > 2.2*   HGB 11.7 11.8  --  14.4   < > 12.6   MCV 96 97  --  92   < > 94    243  --  233   < > 165    142 138 132*   < > 133   POTASSIUM 4.5 4.6 4.7  4.6 3.3*   < > 3.3*   CHLORIDE 109 110* 103 95   < > 96   CO2 31 29 29 32   < > 31   BUN 12 10 9 6*   < > 6*   CR 0.46* 0.38* 0.40* 0.51*   < > 0.38*   ANIONGAP 1* 3 6 5   < > 6   SOFI 7.9* 7.9* 8.5 8.4*   < > 7.8*   * 122* 136* 138*   < > 99   ALBUMIN  --   --  2.8* 3.1*   < > 2.8*   PROTTOTAL  --   --  6.8 7.1   < > 6.4*   BILITOTAL  --   --  0.4 0.4   < > 0.4   ALKPHOS  --   --  76 82   < > 72   ALT 26  --  29 34   < > 25   AST 18  --  25 34   < > 24   LIPASE  --   --   --   --   --  92   TROPONIN  --   --   --  <0.015  --  <0.015    < > = values in this interval not displayed.     No results found for this or any previous visit (from the past 24 hour(s)).

## 2021-11-12 NOTE — PLAN OF CARE
End of Shift Summary  For vital signs and complete assessments, please see documentation flowsheets.     Pertinent assessments: Oriented x4, up independent in room, lungs diminished with crackles, occ NP cough, robitussin and tessalon given, sats mid 90's with 2LPM/NC. Denies pain.     Major Shift Events: Uneventful    Treatment Plan: monitor    Bedside Nurse: Amanda RON RN

## 2021-11-13 VITALS
WEIGHT: 151 LBS | TEMPERATURE: 97.9 F | BODY MASS INDEX: 26.75 KG/M2 | OXYGEN SATURATION: 94 % | HEART RATE: 57 BPM | SYSTOLIC BLOOD PRESSURE: 143 MMHG | RESPIRATION RATE: 16 BRPM | DIASTOLIC BLOOD PRESSURE: 67 MMHG | HEIGHT: 63 IN

## 2021-11-13 LAB
ANION GAP SERPL CALCULATED.3IONS-SCNC: 4 MMOL/L (ref 3–14)
BUN SERPL-MCNC: 12 MG/DL (ref 7–30)
CALCIUM SERPL-MCNC: 8.8 MG/DL (ref 8.5–10.1)
CHLORIDE BLD-SCNC: 104 MMOL/L (ref 94–109)
CO2 SERPL-SCNC: 31 MMOL/L (ref 20–32)
CREAT SERPL-MCNC: 0.49 MG/DL (ref 0.52–1.04)
CRP SERPL-MCNC: <2.9 MG/L (ref 0–8)
D DIMER PPP FEU-MCNC: <0.27 UG/ML FEU (ref 0–0.5)
ERYTHROCYTE [DISTWIDTH] IN BLOOD BY AUTOMATED COUNT: 12.2 % (ref 10–15)
GFR SERPL CREATININE-BSD FRML MDRD: >90 ML/MIN/1.73M2
GLUCOSE BLD-MCNC: 122 MG/DL (ref 70–99)
HCT VFR BLD AUTO: 39.7 % (ref 35–47)
HGB BLD-MCNC: 13.1 G/DL (ref 11.7–15.7)
MCH RBC QN AUTO: 30.6 PG (ref 26.5–33)
MCHC RBC AUTO-ENTMCNC: 33 G/DL (ref 31.5–36.5)
MCV RBC AUTO: 93 FL (ref 78–100)
PLATELET # BLD AUTO: 427 10E3/UL (ref 150–450)
POTASSIUM BLD-SCNC: 4 MMOL/L (ref 3.4–5.3)
RBC # BLD AUTO: 4.28 10E6/UL (ref 3.8–5.2)
SODIUM SERPL-SCNC: 139 MMOL/L (ref 133–144)
WBC # BLD AUTO: 6.1 10E3/UL (ref 4–11)

## 2021-11-13 PROCEDURE — 250N000013 HC RX MED GY IP 250 OP 250 PS 637: Performed by: HOSPITALIST

## 2021-11-13 PROCEDURE — 36415 COLL VENOUS BLD VENIPUNCTURE: CPT | Performed by: INTERNAL MEDICINE

## 2021-11-13 PROCEDURE — 99238 HOSP IP/OBS DSCHRG MGMT 30/<: CPT | Performed by: STUDENT IN AN ORGANIZED HEALTH CARE EDUCATION/TRAINING PROGRAM

## 2021-11-13 PROCEDURE — 5A0945Z ASSISTANCE WITH RESPIRATORY VENTILATION, 24-96 CONSECUTIVE HOURS: ICD-10-PCS | Performed by: STUDENT IN AN ORGANIZED HEALTH CARE EDUCATION/TRAINING PROGRAM

## 2021-11-13 PROCEDURE — 86140 C-REACTIVE PROTEIN: CPT | Performed by: INTERNAL MEDICINE

## 2021-11-13 PROCEDURE — 85027 COMPLETE CBC AUTOMATED: CPT | Performed by: INTERNAL MEDICINE

## 2021-11-13 PROCEDURE — 85379 FIBRIN DEGRADATION QUANT: CPT | Performed by: INTERNAL MEDICINE

## 2021-11-13 PROCEDURE — 250N000012 HC RX MED GY IP 250 OP 636 PS 637: Performed by: INTERNAL MEDICINE

## 2021-11-13 PROCEDURE — XW043E5 INTRODUCTION OF REMDESIVIR ANTI-INFECTIVE INTO CENTRAL VEIN, PERCUTANEOUS APPROACH, NEW TECHNOLOGY GROUP 5: ICD-10-PCS | Performed by: STUDENT IN AN ORGANIZED HEALTH CARE EDUCATION/TRAINING PROGRAM

## 2021-11-13 PROCEDURE — 80048 BASIC METABOLIC PNL TOTAL CA: CPT | Performed by: INTERNAL MEDICINE

## 2021-11-13 PROCEDURE — 250N000013 HC RX MED GY IP 250 OP 250 PS 637: Performed by: INTERNAL MEDICINE

## 2021-11-13 PROCEDURE — 250N000011 HC RX IP 250 OP 636: Performed by: INTERNAL MEDICINE

## 2021-11-13 RX ORDER — BENZONATATE 100 MG/1
100 CAPSULE ORAL 3 TIMES DAILY PRN
Qty: 30 CAPSULE | Refills: 0 | Status: SHIPPED | OUTPATIENT
Start: 2021-11-13 | End: 2021-12-07

## 2021-11-13 RX ORDER — GUAIFENESIN/DEXTROMETHORPHAN 100-10MG/5
10 SYRUP ORAL EVERY 4 HOURS PRN
Qty: 236 ML | Refills: 0 | Status: SHIPPED | OUTPATIENT
Start: 2021-11-13 | End: 2021-12-07

## 2021-11-13 RX ORDER — DEXAMETHASONE 6 MG/1
6 TABLET ORAL DAILY
Qty: 6 TABLET | Refills: 0 | Status: SHIPPED | OUTPATIENT
Start: 2021-11-14 | End: 2021-12-07

## 2021-11-13 RX ADMIN — IRBESARTAN 150 MG: 150 TABLET ORAL at 07:46

## 2021-11-13 RX ADMIN — ASPIRIN 81 MG: 81 TABLET, COATED ORAL at 07:46

## 2021-11-13 RX ADMIN — DEXAMETHASONE 6 MG: 2 TABLET ORAL at 08:02

## 2021-11-13 RX ADMIN — ENOXAPARIN SODIUM 40 MG: 40 INJECTION SUBCUTANEOUS at 07:46

## 2021-11-13 RX ADMIN — GUAIFENESIN AND DEXTROMETHORPHAN 10 ML: 100; 10 SYRUP ORAL at 08:02

## 2021-11-13 ASSESSMENT — ACTIVITIES OF DAILY LIVING (ADL)
ADLS_ACUITY_SCORE: 6

## 2021-11-13 NOTE — DISCHARGE SUMMARY
[facility]  Hospitalist Discharge Summary      Date of Admission:  11/9/2021  Date of Discharge:  11/13/2021  Discharging Provider: Anurag Canales MD      Discharge Diagnoses   Acute Hypoxic Respiratory Failure  COVID-19 Pneumonia  Probable JEROMY  Essential Hypertension  Hyponatremia, resolved  Hypokalemia, resolved    Follow-ups Needed After Discharge   Follow-up Appointments     Follow-up and recommended labs and tests       Follow up with primary care provider, Juanis Davison, within 7 days   for hospital follow- up.           Unresulted Labs Ordered in the Past 30 Days of this Admission     Date and Time Order Name Status Description    11/9/2021 11:30 PM Blood Culture Arm, Right Preliminary     11/9/2021 11:23 PM Blood Culture Peripheral Blood Preliminary       These results will be followed up by PCP.    Discharge Disposition   Discharged to home  Condition at discharge: Good    Hospital Course   Shavonne Lundberg is a 71-year-old female with past medical history significant for hypertension, hyperlipidemia, tachycardia, and suspected JEROMY who presented on 11/10/2021 with worsening symptoms of cough, shortness of breath, and measurable hypoxia on home in the setting of previously diagnosed COVID-19 infection.  Patient originally began having symptoms on 11/1/2021 with one previous admission on 11/7/2021 for an overnight observation stay.  After discharge home she began having increased work of breathing and desaturations to the low-80% when using at home oximetry.  She presented to the hospital and has required 2 to 3L to maintain saturations above 90%.  She received Dexamethasone and three doses of Remdesivir.  She noted significant improvement overnight 10 11/12/2021 and was able to wean to room air.  Patient is stable for discharge home at close follow-up with PCP.  She will continue at home oximetry and additional 5 days of Dexamethasone.    Consultations This Hospital Stay   CARE  MANAGEMENT / SOCIAL WORK IP CONSULT    Code Status   Full Code    Time Spent on this Encounter   I, Anurag Canales MD, personally saw the patient today and spent less than or equal to 30 minutes discharging this patient.       Anurag Canales MD  [place] [address] MEDICAL SURGICAL  [address] [facility] [address]  Phone: [phone]  Fax: [phone]  ______________________________________________________________________    Physical Exam   Vital Signs: Temp: 97.9  F (36.6  C) Temp src: Oral BP: (!) 143/67 Pulse: 57   Resp: 16 SpO2: 94 % O2 Device: None (Room air) Oxygen Delivery: 1 LPM  Weight: 151 lbs 0 oz    General: Pleasant elderly female resting comfortably in hospital bed.  Malaised.  HEENT: Normocephalic, atraumatic.  PERRL, EOMI.  Conjunctiva clear, sclera anicteric.  Cardiac: Regular rate and rhythm without murmurs, gallops, rubs.  Respiratory: Normal work of breathing.  Inspiratory crackles bibasilarly.    Abdomen: Normal active bowel sounds.  Soft, nontender, nondistended.  Musculoskeletal: Moving all extremities appropriately.  Skin: No rashes or abrasions on exposed skin.  Neurologic: Alert and oriented x4.  Cranial nerves II through XII grossly intact.  Psychologic: Anxious about discharge.       Primary Care Physician   Juanis Davison    Discharge Orders      Care Coordination Referral      Reason for your hospital stay    COVID-19 infection     Follow-up and recommended labs and tests     Follow up with primary care provider, Juanis Davison, within 7 days for hospital follow- up.     Activity    Your activity upon discharge: activity as tolerated     Discharge Instructions    Please obtain COVID vaccination 4-6 weeks after symptoms resolve. You should also strongly consider a flu vaccine this year, as the inflammation in your lungs can linger for several weeks.     Diet    Follow this diet upon discharge: Orders Placed This Encounter      Combination Diet Regular Diet Adult        Significant Results and Procedures   Most Recent 3 CBC's:Recent Labs   Lab Test 11/13/21  0814 11/12/21  0544 11/11/21  0639   WBC 6.1 6.1 5.5   HGB 13.1 11.7 11.8   MCV 93 96 97    299 243     Most Recent 3 BMP's:Recent Labs   Lab Test 11/13/21  0814 11/12/21  0544 11/11/21  0639    141 142   POTASSIUM 4.0 4.5 4.6   CHLORIDE 104 109 110*   CO2 31 31 29   BUN 12 12 10   CR 0.49* 0.46* 0.38*   ANIONGAP 4 1* 3   SOFI 8.8 7.9* 7.9*   * 116* 122*     Most Recent 2 LFT's:Recent Labs   Lab Test 11/12/21  0544 11/10/21  1227 11/09/21  2328   AST 18 25 34   ALT 26 29 34   ALKPHOS  --  76 82   BILITOTAL  --  0.4 0.4       Discharge Medications   Current Discharge Medication List      START taking these medications    Details   benzonatate (TESSALON) 100 MG capsule Take 1 capsule (100 mg) by mouth 3 times daily as needed for cough  Qty: 30 capsule, Refills: 0    Associated Diagnoses: Pneumonia due to 2019 novel coronavirus; Acute respiratory failure with hypoxia (H); 2019-nCoV vaccination not done      dexamethasone (DECADRON) 6 MG tablet Take 1 tablet (6 mg) by mouth daily for 6 days  Qty: 6 tablet, Refills: 0    Associated Diagnoses: Pneumonia due to 2019 novel coronavirus; Acute respiratory failure with hypoxia (H); 2019-nCoV vaccination not done      guaiFENesin-dextromethorphan (ROBITUSSIN DM) 100-10 MG/5ML syrup Take 10 mLs by mouth every 4 hours as needed for cough  Qty: 236 mL, Refills: 0    Associated Diagnoses: Pneumonia due to 2019 novel coronavirus; Acute respiratory failure with hypoxia (H); 2019-nCoV vaccination not done         CONTINUE these medications which have NOT CHANGED    Details   Ascorbic Acid (VITAMIN C PO) Take 1 tablet by mouth daily       aspirin 81 MG EC tablet Take 81 mg by mouth daily      co-enzyme Q-10 100 MG CAPS capsule Take 100 mg by mouth daily      diltiazem ER COATED BEADS (CARDIZEM CD) 240 MG 24 hr capsule Take 1 capsule (240 mg) by mouth daily  Qty: 90  capsule, Refills: 3    Associated Diagnoses: Supraventricular tachycardia (H)      Fish Oil-Cholecalciferol (FISH OIL + D3) 1587-1492 MG-UNIT CAPS Take 1 capsule by mouth daily       GARLIC PO Take 1 tablet by mouth 2 times daily       irbesartan (AVAPRO) 150 MG tablet Take 1 tablet (150 mg) by mouth daily  Qty: 90 tablet, Refills: 3    Associated Diagnoses: Benign essential hypertension      Red Yeast Rice Extract 600 MG CAPS Take 600 mg by mouth daily      VITAMIN D PO Take 1 tablet by mouth 2 times daily       nitroGLYcerin (NITROSTAT) 0.4 MG sublingual tablet For chest pain place 1 tablet under the tongue every 5 minutes for 3 doses. If symptoms persist 5 minutes after 1st dose call 911.  Qty: 30 tablet, Refills: 1    Associated Diagnoses: Other chest pain           Allergies   Allergies   Allergen Reactions     Sulfa Drugs Anaphylaxis

## 2021-11-13 NOTE — PLAN OF CARE
End of Shift Summary  For vital signs and complete assessments, please see documentation flowsheets.     Pertinent assessments: Alert and oriented. No complaints of pain. Up in room independently. Robitussin X 1 for cough. On 1L O2 via NC. Patient plans to discharge today.   Major Shift Events: None   Treatment Plan: Decadron, Lovenox, IS, Supplemental O2    Bedside Nurse: Anita Velasquez RN   Patient sitting up in chair. No complaints of pain, patient alert and oriented x3.

## 2021-11-13 NOTE — PLAN OF CARE
Verbalizes understanding of discharge instructions  IV removed tip intact  Discharge medications given to patient. Has own pulse ox monitor.   Awaiting ride for discharge home    1320: assisted safely to family's vehicle for discharge.

## 2021-11-15 ENCOUNTER — PATIENT OUTREACH (OUTPATIENT)
Dept: CARE COORDINATION | Facility: CLINIC | Age: 71
End: 2021-11-15
Payer: COMMERCIAL

## 2021-11-15 LAB
BACTERIA BLD CULT: NO GROWTH
BACTERIA BLD CULT: NO GROWTH

## 2021-11-15 NOTE — LETTER
Can you schedule with nutrition ASAP? Thanks! [facility] CARE COORDINATION  [facility]  November 16, 2021    Shavonne Truongnereim  [address] [facility] [address]      Dear Shavonne,    I am a clinic community health worker who works with Juanis Davison MD at the [facility]. I wanted to thank you for spending the time to talk with me.  Below is a description of clinic care coordination and how I can further assist you.      The clinic care coordination team is made up of a registered nurse,  and community health worker who understand the health care system. The goal of clinic care coordination is to help you manage your health and improve access to the health care system in the most efficient manner. The team can assist you in meeting your health care goals by providing education, coordinating services, strengthening the communication among your providers and supporting you with any resource needs.    Please feel free to contact me at [phone] with any questions or concerns. We are focused on providing you with the highest-quality healthcare experience possible and that all starts with you.     Sincerely,     UTE Peter, B.S. [facility]  Clinic Care Coordination  [facility]:  [place], [place] and [place]  [phone]  [email or web address]

## 2021-11-15 NOTE — PROGRESS NOTES
Clinic Care Coordination Contact  [facility]       Clinical Data: Care Coordinator Outreach  Outreach attempted x 1.  Left message on patient's voicemail with call back information and requested return call.    Chart Review: Referral from IP handoff, in ED to hospital on 11/9-11/13 for COVID. Discharged to home, f/u w PCP in 7 days.     Plan: Care Coordinator will try to reach patient again in 1-2 business days.    Kayleigh Stubbs, UTE, B.S. [facility] Care Coordination  [facility]:  [place], [place] and [place]  [phone]  [email or web address]

## 2021-11-16 ENCOUNTER — APPOINTMENT (OUTPATIENT)
Dept: NURSING | Facility: CLINIC | Age: 71
End: 2021-11-16
Payer: COMMERCIAL

## 2021-11-16 NOTE — PROGRESS NOTES
Clinic Care Coordination Contact  [facility]: Post-Discharge Note  SITUATION                                                      Admission:    Admission Date: 11/09/21   Reason for Admission: cough, shortness of breath, worsening symptoms  Discharge:   Discharge Date: 11/13/21  Discharge Diagnosis: Acute Hypoxic Respiratory Failure COVID-19 Pneumonia    BACKGROUND                                                      Shavonne Lundberg is a 71-year-old female with past medical history significant for hypertension, hyperlipidemia, tachycardia, and suspected JEROMY who presented on 11/10/2021 with worsening symptoms of cough, shortness of breath, and measurable hypoxia on home in the setting of previously diagnosed COVID-19 infection.      ASSESSMENT      Enrollment  Primary Care Care Coordination Status: Declined    Discharge Assessment  How are you doing now that you are home?: Pt states shes is doing well and is thankful for that, new medications are helping, trying to rest.  How are your symptoms? (Red Flag symptoms escalate to triage hotline per guidelines): Improved  Do you feel your condition is stable enough to be safe at home until your provider visit?: Yes  Does the patient have their discharge instructions? : Yes  Does the patient have questions regarding their discharge instructions? : No  Were you started on any new medications or were there changes to any of your previous medications? : Yes  Does the patient have all of their medications?: Yes  Do you have questions regarding any of your medications? : No  Do you have all of your needed medical supplies or equipment (DME)?  (i.e. oxygen tank, CPAP, cane, etc.): Yes  Discharge follow-up appointment scheduled within 14 calendar days? : Yes  Discharge Follow Up Appointment Date: 12/07/21  Discharge Follow Up Appointment Scheduled with?: Primary Care Provider    Care Management   Community Health Worker Initial Outreach  Spoke with patient     Chart  Review: Referral from IP handoff, in ED to hospital on 11/9-11/13 for COVID. Discharged to home, f/u w PCP in 7 days.     CHW introduced self and role with CC  Patient states that she is doing a lot better, very thankful for that. Trying to rest.   She has no outstanding questions or concerns, just scheduled PCP f/u.  CHW inquired if patient is in need of any additional support or resources however patient declines.  CHW provided contact information and encouraged patient to reach out with any future needs or concerns, patient agrees.    Patient accepts CC: No, patient has no outstanding needs for CC at this time. Patient will be sent Care Coordination introduction letter for future reference.       PLAN                                                      Outpatient Plan:  F/u with PCP within 7 days. Get COVID vaccine.    Future Appointments   Date Time Provider Department Center   12/7/2021 10:20 AM Juanis Davison MD [facility]   1/20/2022  2:00 PM [facility]         For any urgent concerns, please contact our 24 hour nurse triage line: [phone] ([phone])       UTE Peter, B.S. [facility] Care Coordination  [facility] Clinics:  [place], [place] and [place]  [phone]  [email or web address]

## 2021-11-27 ENCOUNTER — NURSE TRIAGE (OUTPATIENT)
Dept: NURSING | Facility: CLINIC | Age: 71
End: 2021-11-27
Payer: COMMERCIAL

## 2021-11-27 NOTE — TELEPHONE ENCOUNTER
Pt was in the hospital with Covid, and discharged on 11/13/2021. Pt is calling in about symptoms of tiny clots when blowing her nose at night for the past couple days. Pt denies any actual bleeding, any weakness or dizziness, pale skin. Pt reports some continued fatigue since discharge, but no other symptoms.   Care advice given, use of humidified air, increase fluids, and per protocol pt should be able to monitor this at home. Pt was advised to call back if she develops nose bleeding, or if lightheadedness and weakness occur, or if the small clots in her nose at night becomes worse. Pt verbalized understanding.     Rober Amor RN on 11/27/2021 at 3:10 PM      Reason for Disposition    [1] Mild-moderate nosebleed AND [2] bleeding stopped now    Additional Information    Negative: Fainted or too weak to stand following large blood loss    Negative: Sounds like a life-threatening emergency to the triager    Negative: [1] Bleeding present > 30 minutes AND [2] using correct method of direct pressure    Negative: [1] Bleeding now AND [2] second call after being instructed in correct technique of direct pressure    Negative: Dizziness or lightheadedness    Negative: Pale skin (pallor) of new onset or worsening    Negative: [1] Has nasal packing (inserted by health care provider to control bleeding) AND [2] new rash    Negative: [1] Has nasal packing AND [2] now bleeding around the packing (Exception: few drops or ooze)    Negative: Nosebleed followed a nose injury    Negative: Patient sounds very sick or weak to the triager    Negative: [1] Bleeding recurs 3 or more times in 24 hours AND [2] direct pressure applied correctly    Negative: [1] Skin bruises or bleeding gums AND [2] not caused by an injury    Negative: [1] Large amount of blood has been lost (e.g., 1 cup or 240 ml) AND [2] bleeding now controlled (stopped)    Negative: [1] Has nasal packing AND [2] fever > 100.4 F (38.0 C)    Negative: Taking Coumadin  (warfarin) or other strong blood thinner, or known bleeding disorder (e.g., thrombocytopenia)    Negative: Has nasal packing (inserted by health care provider to control bleeding)    Negative: Hard-to-stop nosebleeds are a chronic symptom (recurrent or ongoing AND present > 4 weeks)    Negative: Easy bleeding present in other family members    Protocols used: NOSEBLEED-A-AH

## 2021-12-07 ENCOUNTER — OFFICE VISIT (OUTPATIENT)
Dept: FAMILY MEDICINE | Facility: CLINIC | Age: 71
End: 2021-12-07
Payer: COMMERCIAL

## 2021-12-07 VITALS
BODY MASS INDEX: 27.16 KG/M2 | OXYGEN SATURATION: 98 % | DIASTOLIC BLOOD PRESSURE: 60 MMHG | SYSTOLIC BLOOD PRESSURE: 122 MMHG | HEART RATE: 97 BPM | TEMPERATURE: 98.7 F | RESPIRATION RATE: 14 BRPM | WEIGHT: 153.3 LBS | HEIGHT: 63 IN

## 2021-12-07 DIAGNOSIS — U07.1 PNEUMONIA DUE TO 2019 NOVEL CORONAVIRUS: ICD-10-CM

## 2021-12-07 DIAGNOSIS — I10 BENIGN ESSENTIAL HYPERTENSION: ICD-10-CM

## 2021-12-07 DIAGNOSIS — J12.82 PNEUMONIA DUE TO 2019 NOVEL CORONAVIRUS: ICD-10-CM

## 2021-12-07 DIAGNOSIS — Z09 HOSPITAL DISCHARGE FOLLOW-UP: Primary | ICD-10-CM

## 2021-12-07 DIAGNOSIS — J96.01 ACUTE RESPIRATORY FAILURE WITH HYPOXIA (H): ICD-10-CM

## 2021-12-07 PROCEDURE — 96127 BRIEF EMOTIONAL/BEHAV ASSMT: CPT | Performed by: INTERNAL MEDICINE

## 2021-12-07 PROCEDURE — 99214 OFFICE O/P EST MOD 30 MIN: CPT | Performed by: INTERNAL MEDICINE

## 2021-12-07 ASSESSMENT — ANXIETY QUESTIONNAIRES
2. NOT BEING ABLE TO STOP OR CONTROL WORRYING: NOT AT ALL
GAD7 TOTAL SCORE: 0
3. WORRYING TOO MUCH ABOUT DIFFERENT THINGS: NOT AT ALL
7. FEELING AFRAID AS IF SOMETHING AWFUL MIGHT HAPPEN: NOT AT ALL
1. FEELING NERVOUS, ANXIOUS, OR ON EDGE: NOT AT ALL
IF YOU CHECKED OFF ANY PROBLEMS ON THIS QUESTIONNAIRE, HOW DIFFICULT HAVE THESE PROBLEMS MADE IT FOR YOU TO DO YOUR WORK, TAKE CARE OF THINGS AT HOME, OR GET ALONG WITH OTHER PEOPLE: NOT DIFFICULT AT ALL
6. BECOMING EASILY ANNOYED OR IRRITABLE: NOT AT ALL
5. BEING SO RESTLESS THAT IT IS HARD TO SIT STILL: NOT AT ALL

## 2021-12-07 ASSESSMENT — MIFFLIN-ST. JEOR: SCORE: 1179.49

## 2021-12-07 ASSESSMENT — PAIN SCALES - GENERAL: PAINLEVEL: NO PAIN (0)

## 2021-12-07 ASSESSMENT — PATIENT HEALTH QUESTIONNAIRE - PHQ9: 5. POOR APPETITE OR OVEREATING: NOT AT ALL

## 2021-12-07 NOTE — PROGRESS NOTES
"  Assessment & Plan     (Z09) Hospital discharge follow-up  (primary encounter diagnosis)  Comment: hospitalized at [facility]  11/9/2021-11/13/2021  Treated with oxygen, steroids.   She did not require ICU admission or intubation; Flu shot and Covid Immunizations were recommended and she declines both.   Plan: she states she is feeling better, she did NOT require home oxygen therapy.     (U07.1,  J12.82) Pneumonia due to 2019 novel coronavirus  Comment: hospitalized due to respiratory illness, no home oxygen therapy, discharge to home without home O2  Plan: discharged to home on 5 more days of steroids and close monitoring;     (J96.01) Acute respiratory failure with hypoxia (H)  Comment: related to hospitalization and need for oxygen short term and steroid treatment.   Plan: related in COVID 19 infection, declined immunizations at time of discharge.    (I10) Benign essential hypertension  Comment: BLOOD PRESSURE well controllled  Plan:     Review of the result(s) of each unique test - chart reviewed including medications, lab results, course of illness and plan of care.   Ordering of each unique test  Prescription drug management  32 minutes spent on the date of the encounter doing chart review, history and exam, documentation and further activities per the note       BMI:   Estimated body mass index is 27.16 kg/m  as calculated from the following:    Height as of this encounter: 1.6 m (5' 3\").    Weight as of this encounter: 69.5 kg (153 lb 4.8 oz).       MEDICATIONS:   No orders of the defined types were placed in this encounter.         - Continue other medications without change  FUTURE APPOINTMENTS:       - [facility]   Regular exercise    Return in about 6 weeks (around 1/20/2022) for Wellness visit.    Juanis Davison MD  Internal Medicine   [facility] JEROME Marvin is a 71 year old who presents for the " following health issues     HPI       Hospital Follow-up Visit:    Hospital/Nursing Home/IP Rehab Facility: [facility]  Date of Admission: 11/9/2021  Date of Discharge: 11/13/2021  Reason(s) for Admission: COVID Pnuemonia      Was your hospitalization related to COVID-19? YES   How are you feeling today? She is feeling weak. Better though  In the past 24 hours have you had shortness of breath when speaking, walking, or climbing stairs? My breathing issues have improved  Do you have a cough? Yes, I have a cough but it's not worse  When is the last time you had a fever greater than 100? Thanksgiving. 100.7   Are you having any other symptoms? Fatigue and Pain or pressure in chest   Do you have any other stressors you would like to discuss with your provider? No         PHQ Assesment Total Score(s) 12/7/2021   PHQ-9 Score 4   Some recent data might be hidden       SAPNA-7 Results 12/7/2021   SAPNA-7 Total Score 0   Some recent data might be hidden     PTSD Screen Score 12/7/2021   Have you ever experienced this kind of event? Yes   PTSD Screen (Score of 3 or more suggests positive screen) 0   Some recent data might be hidden       Was the patient in the ICU or did the patient experience delirium during hospitalization?  No      Problems taking medications regularly:  None  Medication changes since discharge: Added steroids and cough medicine but it was only for a week   Problems adhering to non-medication therapy:  None    Summary of hospitalization:  [facility] discharge summary reviewed  Diagnostic Tests/Treatments reviewed.  Follow up needed: recommend flu shot- pt declines.   Other Healthcare Providers Involved in Patient s Care:         None  Update since discharge: improved.    Hospital course: Received Dexamethasone and three doses of Remdesivir.  She noted significant improvement overnight 10 11/12/2021 and was able to wean to room air.  Patient is stable for discharge home at  "close follow-up with PCP.  She will continue at home oximetry and additional 5 days of Dexamethasone.    Continues to decline COVID and Influenza immunization.  Oxygen therapy. She was not intubated.   Post Discharge Medication Reconciliation: discharge medications reconciled, continue medications without change.  Plan of care communicated with patient            Review of Systems   CONSTITUTIONAL: NEGATIVE for fever, chills, change in weight  ENT/MOUTH: NEGATIVE for ear, mouth and throat problems  RESP:resolving respiratory illness; COVID pneumonia, no need for oxygen therapy at home.  CV: NEGATIVE for chest pain, palpitations or peripheral edema  GI: NEGATIVE for nausea, abdominal pain, heartburn, or change in bowel habits  MUSCULOSKELETAL: NEGATIVE for significant arthralgias or myalgia  NEURO: NEGATIVE for weakness, dizziness or paresthesias  HEME/ALLERGY/IMMUNE: NEGATIVE for bleeding problems  PSYCHIATRIC: NEGATIVE for changes in mood or affect      Objective    /60 (BP Location: Right arm, Patient Position: Sitting, Cuff Size: Adult Regular)   Pulse 97   Temp 98.7  F (37.1  C) (Oral)   Resp 14   Ht 1.6 m (5' 3\")   Wt 69.5 kg (153 lb 4.8 oz)   SpO2 98%   BMI 27.16 kg/m    Body mass index is 27.16 kg/m .  Physical Exam   GENERAL: healthy, alert and no distress  NECK: no adenopathy, no asymmetry, masses, or scars and thyroid normal to palpation  RESP: lungs clear to auscultation - no rales, rhonchi or wheezes  CV: regular rate and rhythm, normal S1 S2, no S3 or S4, no murmur, click or rub, no peripheral edema and peripheral pulses strong  ABDOMEN: soft, nontender and bowel sounds normal  MS: no gross musculoskeletal defects noted, no edema  NEURO: Normal strength and tone, mentation intact and speech normal  PSYCH: mentation appears normal, affect normal/bright            "

## 2021-12-08 ASSESSMENT — PATIENT HEALTH QUESTIONNAIRE - PHQ9: SUM OF ALL RESPONSES TO PHQ QUESTIONS 1-9: 4

## 2021-12-08 ASSESSMENT — ANXIETY QUESTIONNAIRES: GAD7 TOTAL SCORE: 0

## 2022-01-11 DIAGNOSIS — I47.10 SUPRAVENTRICULAR TACHYCARDIA (H): ICD-10-CM

## 2022-01-12 RX ORDER — DILTIAZEM HYDROCHLORIDE 240 MG/1
240 CAPSULE, COATED, EXTENDED RELEASE ORAL DAILY
Qty: 90 CAPSULE | Refills: 1 | Status: SHIPPED | OUTPATIENT
Start: 2022-01-12 | End: 2022-01-20

## 2022-01-13 DIAGNOSIS — I10 BENIGN ESSENTIAL HYPERTENSION: ICD-10-CM

## 2022-01-14 RX ORDER — IRBESARTAN 150 MG/1
150 TABLET ORAL DAILY
Qty: 90 TABLET | Refills: 0 | Status: SHIPPED | OUTPATIENT
Start: 2022-01-14 | End: 2022-01-20

## 2022-01-20 ENCOUNTER — OFFICE VISIT (OUTPATIENT)
Dept: FAMILY MEDICINE | Facility: CLINIC | Age: 72
End: 2022-01-20
Payer: COMMERCIAL

## 2022-01-20 VITALS
OXYGEN SATURATION: 98 % | TEMPERATURE: 98 F | RESPIRATION RATE: 16 BRPM | HEIGHT: 62 IN | DIASTOLIC BLOOD PRESSURE: 68 MMHG | BODY MASS INDEX: 28.36 KG/M2 | HEART RATE: 84 BPM | SYSTOLIC BLOOD PRESSURE: 130 MMHG | WEIGHT: 154.1 LBS

## 2022-01-20 DIAGNOSIS — I10 BENIGN ESSENTIAL HYPERTENSION: ICD-10-CM

## 2022-01-20 DIAGNOSIS — Z28.21 COVID-19 VACCINE SERIES DECLINED: ICD-10-CM

## 2022-01-20 DIAGNOSIS — Z00.00 ENCOUNTER FOR MEDICARE ANNUAL WELLNESS EXAM: Primary | ICD-10-CM

## 2022-01-20 DIAGNOSIS — I47.10 SUPRAVENTRICULAR TACHYCARDIA (H): ICD-10-CM

## 2022-01-20 DIAGNOSIS — Z28.310 COVID-19 VACCINE SERIES DECLINED: ICD-10-CM

## 2022-01-20 DIAGNOSIS — E78.5 HYPERLIPIDEMIA WITH TARGET LDL LESS THAN 130: ICD-10-CM

## 2022-01-20 DIAGNOSIS — J12.82 PNEUMONIA DUE TO 2019 NOVEL CORONAVIRUS: ICD-10-CM

## 2022-01-20 DIAGNOSIS — R73.09 ELEVATED GLUCOSE: ICD-10-CM

## 2022-01-20 DIAGNOSIS — U07.1 PNEUMONIA DUE TO 2019 NOVEL CORONAVIRUS: ICD-10-CM

## 2022-01-20 PROBLEM — J96.01 ACUTE RESPIRATORY FAILURE WITH HYPOXIA (H): Status: RESOLVED | Noted: 2021-11-07 | Resolved: 2022-01-20

## 2022-01-20 PROCEDURE — 99214 OFFICE O/P EST MOD 30 MIN: CPT | Mod: 25 | Performed by: INTERNAL MEDICINE

## 2022-01-20 PROCEDURE — 99397 PER PM REEVAL EST PAT 65+ YR: CPT | Performed by: INTERNAL MEDICINE

## 2022-01-20 RX ORDER — DILTIAZEM HYDROCHLORIDE 240 MG/1
240 CAPSULE, COATED, EXTENDED RELEASE ORAL DAILY
Qty: 90 CAPSULE | Refills: 3 | Status: SHIPPED | OUTPATIENT
Start: 2022-01-20 | End: 2022-12-05

## 2022-01-20 RX ORDER — IRBESARTAN 150 MG/1
150 TABLET ORAL DAILY
Qty: 90 TABLET | Refills: 1 | Status: CANCELLED | OUTPATIENT
Start: 2022-01-20

## 2022-01-20 RX ORDER — IRBESARTAN 150 MG/1
150 TABLET ORAL DAILY
Qty: 90 TABLET | Refills: 3 | Status: SHIPPED | OUTPATIENT
Start: 2022-01-20 | End: 2022-11-29

## 2022-01-20 ASSESSMENT — PAIN SCALES - GENERAL: PAINLEVEL: NO PAIN (0)

## 2022-01-20 ASSESSMENT — ACTIVITIES OF DAILY LIVING (ADL): CURRENT_FUNCTION: NO ASSISTANCE NEEDED

## 2022-01-20 ASSESSMENT — MIFFLIN-ST. JEOR: SCORE: 1167.24

## 2022-01-20 NOTE — PROGRESS NOTES
"Chief Complaint   Patient presents with     Wellness Visit     Hypertension       SUBJECTIVE:   Shavonne Lundberg is a 71 year old female who presents for Preventive Visit.      Patient has been advised of split billing requirements and indicates understanding: Yes  Are you in the first 12 months of your Medicare coverage?  No    Healthy Habits:    In general, how would you rate your overall health?  Very good    Frequency of exercise:  4-5 days/week    Duration of exercise:  15-30 minutes    Do you usually eat at least 4 servings of fruit and vegetables a day, include whole grains    & fiber and avoid regularly eating high fat or \"junk\" foods?  Yes    Taking medications regularly:  Yes    Barriers to taking medications:  None    Medication side effects:  None    Ability to successfully perform activities of daily living:  No assistance needed    Home Safety:  No safety concerns identified    Hearing Impairment:  No hearing concerns    In the past 6 months, have you been bothered by leaking of urine?  No    In general, how would you rate your overall mental or emotional health?  Excellent      PHQ-2 Total Score:    Additional concerns today:  Yes    Do you feel safe in your environment? Yes    Have you ever done Advance Care Planning? (For example, a Health Directive, POLST, or a discussion with a medical provider or your loved ones about your wishes): Yes, patient states has an Advance Care Planning document and will bring a copy to the clinic.       Fall risk  Fallen 2 or more times in the past year?: No  Any fall with injury in the past year?: No    Cognitive Screening   1) Repeat 3 items (Leader, Season, Table)    2) Clock draw: NORMAL  3) 3 item recall: Recalls 3 objects  Results: 3 items recalled: COGNITIVE IMPAIRMENT LESS LIKELY    Mini-CogTM Copyright YURIDIA Abrams. Licensed by the author for use in [facility]; reprinted with permission (heidi@.[email or web address]). All rights reserved.      Do you have sleep " apnea, excessive snoring or daytime drowsiness?: uncertain     Reviewed and updated as needed this visit by clinical staff  Tobacco  Allergies  Meds   Med Hx  Surg Hx  Fam Hx  Soc Hx       Reviewed and updated as needed this visit by Provider               Social History     Tobacco Use     Smoking status: Former Smoker     Smokeless tobacco: Never Used     Tobacco comment: quit in 1985   Substance Use Topics     Alcohol use: No     Alcohol/week: 0.0 standard drinks         Alcohol Use 1/6/2020   Prescreen: >3 drinks/day or >7 drinks/week? Not Applicable   Prescreen: >3 drinks/day or >7 drinks/week? -           Hypertension Follow-up    Do you check your blood pressure regularly outside of the clinic? Yes   Are you following a low salt diet? Yes  Are your blood pressures ever more than 140 on the top number (systolic) OR more   than 90 on the bottom number (diastolic), for example 140/90? No      Current providers sharing in care for this patient include:   Patient Care Team:  Juanis Davison MD as PCP - General (Internal Medicine)  Juanis Davison MD as Assigned PCP  Ava Alvarado MD as Assigned Heart and Vascular Provider    The following health maintenance items are reviewed in Epic and correct as of today:  Health Maintenance Due   Topic Date Due     DEXA  Never done     COVID-19 Vaccine (1) Never done     ZOSTER IMMUNIZATION (1 of 2) Never done     LUNG CANCER SCREENING  Never done     INFLUENZA VACCINE (1) 09/01/2021     FALL RISK ASSESSMENT  01/07/2022     MAMMO SCREENING  01/03/2022     MEDICARE ANNUAL WELLNESS VISIT  01/07/2022     Labs reviewed in EPIC  BP Readings from Last 3 Encounters:   01/20/22 130/68   12/07/21 122/60   11/13/21 (!) 143/67    Wt Readings from Last 3 Encounters:   01/20/22 69.9 kg (154 lb 1.6 oz)   12/07/21 69.5 kg (153 lb 4.8 oz)   11/10/21 68.5 kg (151 lb)                  Patient Active Problem List   Diagnosis     Benign essential hypertension      Supraventricular tachycardia (H)     Hyperlipidemia with target LDL less than 130     Blood glucose elevated     Sleep disturbance     Pneumonia due to 2019 novel coronavirus     Past Surgical History:   Procedure Laterality Date     C/SECTION, LOW TRANSVERSE      x2     HERNIA REPAIR         Social History     Tobacco Use     Smoking status: Former Smoker     Smokeless tobacco: Never Used     Tobacco comment: quit in    Substance Use Topics     Alcohol use: No     Alcohol/week: 0.0 standard drinks     Family History   Problem Relation Age of Onset     Lung Cancer Father      Lung Cancer Brother      Arrhythmia Brother      Arrhythmia Brother      Coronary Artery Disease Brother         MI @55 and      Coronary Artery Disease Brother         MI @55 and  @76     Coronary Artery Disease Brother         MI @54 and          Current Outpatient Medications   Medication Sig Dispense Refill     Ascorbic Acid (VITAMIN C PO) Take 1 tablet by mouth daily        aspirin 81 MG EC tablet Take 81 mg by mouth daily       co-enzyme Q-10 100 MG CAPS capsule Take 100 mg by mouth daily       diltiazem ER COATED BEADS (CARDIZEM CD) 240 MG 24 hr capsule Take 1 capsule (240 mg) by mouth daily 90 capsule 1     Fish Oil-Cholecalciferol (FISH OIL + D3) 5216-0706 MG-UNIT CAPS Take 1 capsule by mouth daily        GARLIC PO Take 1 tablet by mouth 2 times daily        irbesartan (AVAPRO) 150 MG tablet Take 1 tablet (150 mg) by mouth daily 90 tablet 0     nitroGLYcerin (NITROSTAT) 0.4 MG sublingual tablet For chest pain place 1 tablet under the tongue every 5 minutes for 3 doses. If symptoms persist 5 minutes after 1st dose call 911. 30 tablet 1     Red Yeast Rice Extract 600 MG CAPS Take 600 mg by mouth daily       VITAMIN D PO Take 1 tablet by mouth 2 times daily        Allergies   Allergen Reactions     Sulfa Drugs Anaphylaxis     Recent Labs   Lab Test 21  0814 21  0544 21  0639 11/10/21  1227 21  1648  "11/07/21  1212 01/07/21  1406 01/06/20  1528 12/27/18  1455 12/27/17  0815   A1C  --   --   --   --   --   --   --   --  5.5 5.6   LDL  --   --   --   --   --   --  117* 152* 160* 90   HDL  --   --   --   --   --   --  45* 50 39* 32*   TRIG  --   --   --   --   --   --  114 63 107 75   ALT  --  26  --  29 34   < > 28 21 20 32   CR 0.49* 0.46*   < > 0.40* 0.51*   < > 0.48* 0.45* 0.53 0.47*   GFRESTIMATED >90 >90   < > >90 >90   < > >90 >90 >90 >90   GFRESTBLACK  --   --   --   --   --   --  >90 >90 >90 >90   POTASSIUM 4.0 4.5   < > 4.7  4.6 3.3*   < > 4.0 4.0 4.2 4.2   TSH  --   --   --   --   --   --  0.99  --   --  1.85    < > = values in this interval not displayed.      Mammogram Screening: Mammogram Screening: Recommended mammography every 1-2 years with patient discussion and risk factor consideration        Review of Systems  CONSTITUTIONAL: NEGATIVE for fever, chills, change in weight  INTEGUMENTARY/SKIN: NEGATIVE for worrisome rashes, moles or lesions  EYES: NEGATIVE for vision changes or irritation  ENT/MOUTH: NEGATIVE for ear, mouth and throat problems  RESP: NEGATIVE for significant cough or SOB;  COVID Pneumonia 11/2021; recovered January; no supplemental oxygen  BREAST: NEGATIVE for masses, tenderness or discharge  CV: NEGATIVE for chest pain, palpitations or peripheral edema  GI: NEGATIVE for nausea, abdominal pain, heartburn, or change in bowel habits  : NEGATIVE for frequency, dysuria, or hematuria  MUSCULOSKELETAL: NEGATIVE for significant arthralgias or myalgia  NEURO: NEGATIVE for weakness, dizziness or paresthesias  ENDOCRINE: NEGATIVE for temperature intolerance, skin/hair changes  HEME: NEGATIVE for bleeding problems  PSYCHIATRIC: NEGATIVE for changes in mood or affect    OBJECTIVE:   /68   Pulse 84   Temp 98  F (36.7  C) (Oral)   Resp 16   Ht 1.575 m (5' 2\")   Wt 69.9 kg (154 lb 1.6 oz)   SpO2 98%   BMI 28.19 kg/m   Estimated body mass index is 28.19 kg/m  as calculated from " "the following:    Height as of this encounter: 1.575 m (5' 2\").    Weight as of this encounter: 69.9 kg (154 lb 1.6 oz).  Physical Exam  GENERAL: healthy, alert and no distress  EYES: Eyes grossly normal to inspection  HENT: ear canals and TM's normal, nose and mouth without ulcers or lesions  NECK: no adenopathy, no asymmetry, masses, or scars and thyroid normal to palpation  RESP: lungs clear to auscultation - no rales, rhonchi or wheezes  CV: regular rate and rhythm, normal S1 S2, no S3 or S4, no murmur, click or rub, no peripheral edema and peripheral pulses strong  ABDOMEN: soft, nontender, no hepatosplenomegaly, no masses and bowel sounds normal  MS: no gross musculoskeletal defects noted, no edema  NEURO: Normal strength and tone, sensory exam grossly normal and mentation intact  PSYCH: mentation appears normal, affect normal/bright    Diagnostic Test Results:  Labs reviewed in Epic            ASSESSMENT / PLAN:   (Z00.00) Encounter for Medicare annual wellness exam  (primary encounter diagnosis)  Comment: HCM reviewed; mammo 1/3/2020, colon screening 2015, next due 2025; reviewed immunizations; questions/concerns reviewed with her.  Plan:     (I10) Benign essential hypertension  Comment: BLOOD PRESSURE well controlled; meds well tolerated.   Plan: Albumin Random Urine Quantitative with Creat         Ratio, Comprehensive metabolic panel,         irbesartan (AVAPRO) 150 MG tablet          (I47.1) Supraventricular tachycardia (H)  Comment: she reports NO EPISODES of SVT; Cardizem well tolerated and effective  Plan: diltiazem ER COATED BEADS (CARDIZEM CD) 240 MG 24 hr capsule         (R73.09) Elevated glucose  Comment: glucose elevated  Plan: Hemoglobin A1c          (E78.5) Hyperlipidemia with target LDL less than 130  Comment: she has been taking Red Rise Yeast; desires lipid assessment; \"if cholesterol high, may need different cholesterol lowering Tx.  Plan: Lipid panel reflex to direct LDL Fasting,         " "Comprehensive metabolic panel          (U07.1,  J12.82) Pneumonia due to 2019 novel coronavirus  Comment: COVID symptom onset 11/1/21, and Positive COVID test 11/4/21; hospitalized; no home O2; declines vaccination  Plan: addressed vaccine hesitancy; concerns addressed       Patient has been advised of split billing requirements and indicates understanding: Yes    COUNSELING:  Reviewed preventive health counseling, as reflected in patient instructions       Regular exercise       Healthy diet/nutrition       Immunizations  Discussed- declines COVID Vaccine         Colon cancer screening- due 2025    Estimated body mass index is 28.19 kg/m  as calculated from the following:    Height as of this encounter: 1.575 m (5' 2\").    Weight as of this encounter: 69.9 kg (154 lb 1.6 oz).        She reports that she has quit smoking. She has never used smokeless tobacco.      Appropriate preventive services were discussed with this patient, including applicable screening as appropriate for cardiovascular disease, diabetes, osteopenia/osteoporosis, and glaucoma.  As appropriate for age/gender, discussed screening for colorectal cancer, prostate cancer, breast cancer, and cervical cancer. Checklist reviewing preventive services available has been given to the patient.    Reviewed patients plan of care and provided an AVS. The Complex Care Plan (for patients with higher acuity and needing more deliberate coordination of services) for Shavonne meets the Care Plan requirement. This Care Plan has been established and reviewed with the Patient.    Counseling Resources:  ATP IV Guidelines  Pooled Cohorts Equation Calculator  Breast Cancer Risk Calculator  Breast Cancer: Medication to Reduce Risk  FRAX Risk Assessment  ICSI Preventive Guidelines  Dietary Guidelines for Americans, 2010  USDA's MyPlate  ASA Prophylaxis  Lung CA Screening    Juanis Davison MD  Internal Medicine  [facility]" Risks:    30 minutes in addition to HEALTH CARE MAINTENANCE are spent with patient, over 50% of that time spent providing counselling, discussing and reviewing medical conditions/concerns, meds and potential side effects.

## 2022-01-20 NOTE — PATIENT INSTRUCTIONS
Patient Education   Personalized Prevention Plan  You are due for the preventive services outlined below.  Your care team is available to assist you in scheduling these services.  If you have already completed any of these items, please share that information with your care team to update in your medical record.  Health Maintenance Due   Topic Date Due     Osteoporosis Screening  Never done     COVID-19 Vaccine (1) Never done     Zoster (Shingles) Vaccine (1 of 2) Never done     LUNG CANCER SCREENING  Never done     Flu Vaccine (1) 09/01/2021     PHQ-2  01/01/2022     FALL RISK ASSESSMENT  01/07/2022     Mammogram  01/03/2022     Annual Wellness Visit  01/07/2022

## 2022-02-10 ENCOUNTER — TELEPHONE (OUTPATIENT)
Dept: FAMILY MEDICINE | Facility: CLINIC | Age: 72
End: 2022-02-10
Payer: COMMERCIAL

## 2022-02-10 NOTE — LETTER
2022        Shavonne Lundberg  [id number] [facility] [id number]    : 1950         To Whom it May Concern,    Shavonne Lundberg has arthritis and is requesting a letter of support regarding installation of a Safe Step Tub.  This would lessen her risk of injury while bathing.     Thank you for your consideration.    Sincerely,         Juanis Davison  Internal Medicine    electronically signed.

## 2022-02-10 NOTE — TELEPHONE ENCOUNTER
Shavonne is calling stating that due to her arthritis she had a Safe Step Tub installed. Upon speaking to her  he stated that she can claim it as a medical expense and advised it would be good to have an order for the tub.    Shavonne- [phone], ok to leave detailed message.    Maegan Vargas-

## 2022-02-25 ENCOUNTER — LAB (OUTPATIENT)
Dept: LAB | Facility: CLINIC | Age: 72
End: 2022-02-25
Payer: COMMERCIAL

## 2022-02-25 ENCOUNTER — ANCILLARY PROCEDURE (OUTPATIENT)
Dept: MAMMOGRAPHY | Facility: CLINIC | Age: 72
End: 2022-02-25
Payer: COMMERCIAL

## 2022-02-25 DIAGNOSIS — I10 BENIGN ESSENTIAL HYPERTENSION: ICD-10-CM

## 2022-02-25 DIAGNOSIS — E78.5 HYPERLIPIDEMIA WITH TARGET LDL LESS THAN 130: ICD-10-CM

## 2022-02-25 DIAGNOSIS — Z12.31 VISIT FOR SCREENING MAMMOGRAM: ICD-10-CM

## 2022-02-25 DIAGNOSIS — R73.09 ELEVATED GLUCOSE: ICD-10-CM

## 2022-02-25 LAB
CREAT UR-MCNC: 71 MG/DL
HBA1C MFR BLD: 5.6 % (ref 0–5.6)
MICROALBUMIN UR-MCNC: 9 MG/L
MICROALBUMIN/CREAT UR: 12.68 MG/G CR (ref 0–25)

## 2022-02-25 PROCEDURE — 77067 SCR MAMMO BI INCL CAD: CPT | Mod: TC | Performed by: RADIOLOGY

## 2022-02-25 PROCEDURE — 77063 BREAST TOMOSYNTHESIS BI: CPT | Mod: TC | Performed by: RADIOLOGY

## 2022-02-25 PROCEDURE — 80061 LIPID PANEL: CPT

## 2022-02-25 PROCEDURE — 80053 COMPREHEN METABOLIC PANEL: CPT

## 2022-02-25 PROCEDURE — 36415 COLL VENOUS BLD VENIPUNCTURE: CPT

## 2022-02-25 PROCEDURE — 83036 HEMOGLOBIN GLYCOSYLATED A1C: CPT

## 2022-02-25 PROCEDURE — 82043 UR ALBUMIN QUANTITATIVE: CPT

## 2022-02-25 NOTE — TELEPHONE ENCOUNTER
I am not aware of any order that can be placed. It did not come from medical supply.   Can provide a brief letter of support.  Unable to say it was necessary but certainly beneficial.

## 2022-02-26 LAB
ALBUMIN SERPL-MCNC: 3.6 G/DL (ref 3.4–5)
ALP SERPL-CCNC: 77 U/L (ref 40–150)
ALT SERPL W P-5'-P-CCNC: 30 U/L (ref 0–50)
ANION GAP SERPL CALCULATED.3IONS-SCNC: 8 MMOL/L (ref 3–14)
AST SERPL W P-5'-P-CCNC: 17 U/L (ref 0–45)
BILIRUB SERPL-MCNC: 0.4 MG/DL (ref 0.2–1.3)
BUN SERPL-MCNC: 10 MG/DL (ref 7–30)
CALCIUM SERPL-MCNC: 9.3 MG/DL (ref 8.5–10.1)
CHLORIDE BLD-SCNC: 106 MMOL/L (ref 94–109)
CHOLEST SERPL-MCNC: 186 MG/DL
CO2 SERPL-SCNC: 26 MMOL/L (ref 20–32)
CREAT SERPL-MCNC: 0.53 MG/DL (ref 0.52–1.04)
FASTING STATUS PATIENT QL REPORTED: YES
GFR SERPL CREATININE-BSD FRML MDRD: >90 ML/MIN/1.73M2
GLUCOSE BLD-MCNC: 98 MG/DL (ref 70–99)
HDLC SERPL-MCNC: 46 MG/DL
LDLC SERPL CALC-MCNC: 124 MG/DL
NONHDLC SERPL-MCNC: 140 MG/DL
POTASSIUM BLD-SCNC: 4.2 MMOL/L (ref 3.4–5.3)
PROT SERPL-MCNC: 6.7 G/DL (ref 6.8–8.8)
SODIUM SERPL-SCNC: 140 MMOL/L (ref 133–144)
TRIGL SERPL-MCNC: 81 MG/DL

## 2022-04-12 ENCOUNTER — TRANSFERRED RECORDS (OUTPATIENT)
Dept: HEALTH INFORMATION MANAGEMENT | Facility: CLINIC | Age: 72
End: 2022-04-12
Payer: COMMERCIAL

## 2022-04-12 LAB — HEMOCCULT STL QL IA: NEGATIVE

## 2022-08-25 ENCOUNTER — OFFICE VISIT (OUTPATIENT)
Dept: FAMILY MEDICINE | Facility: CLINIC | Age: 72
End: 2022-08-25
Payer: COMMERCIAL

## 2022-08-25 VITALS
HEART RATE: 99 BPM | RESPIRATION RATE: 16 BRPM | BODY MASS INDEX: 27.98 KG/M2 | WEIGHT: 153 LBS | TEMPERATURE: 99.7 F | SYSTOLIC BLOOD PRESSURE: 136 MMHG | DIASTOLIC BLOOD PRESSURE: 76 MMHG | OXYGEN SATURATION: 97 %

## 2022-08-25 DIAGNOSIS — R31.9 URINARY TRACT INFECTION WITH HEMATURIA, SITE UNSPECIFIED: Primary | ICD-10-CM

## 2022-08-25 DIAGNOSIS — N39.0 URINARY TRACT INFECTION WITH HEMATURIA, SITE UNSPECIFIED: Primary | ICD-10-CM

## 2022-08-25 DIAGNOSIS — R39.9 URINARY SYMPTOM OR SIGN: Primary | ICD-10-CM

## 2022-08-25 DIAGNOSIS — R39.9 URINARY SYMPTOM OR SIGN: ICD-10-CM

## 2022-08-25 LAB
BACTERIA #/AREA URNS HPF: ABNORMAL /HPF
RBC #/AREA URNS AUTO: >100 /HPF
SQUAMOUS #/AREA URNS AUTO: ABNORMAL /LPF
WBC #/AREA URNS AUTO: >100 /HPF
WBC CLUMPS #/AREA URNS HPF: PRESENT /HPF

## 2022-08-25 PROCEDURE — 81015 MICROSCOPIC EXAM OF URINE: CPT | Performed by: PHYSICIAN ASSISTANT

## 2022-08-25 PROCEDURE — 87186 SC STD MICRODIL/AGAR DIL: CPT | Performed by: PHYSICIAN ASSISTANT

## 2022-08-25 PROCEDURE — 87086 URINE CULTURE/COLONY COUNT: CPT | Performed by: PHYSICIAN ASSISTANT

## 2022-08-25 PROCEDURE — 99213 OFFICE O/P EST LOW 20 MIN: CPT | Performed by: PHYSICIAN ASSISTANT

## 2022-08-25 RX ORDER — NITROFURANTOIN 25; 75 MG/1; MG/1
100 CAPSULE ORAL 2 TIMES DAILY
Qty: 14 CAPSULE | Refills: 0 | Status: SHIPPED | OUTPATIENT
Start: 2022-08-25 | End: 2023-03-02

## 2022-08-25 ASSESSMENT — PAIN SCALES - GENERAL: PAINLEVEL: SEVERE PAIN (6)

## 2022-08-25 NOTE — PROGRESS NOTES
"  Assessment & Plan     Urinary tract infection with hematuria, site unspecified  Urinary symptom or sign  Using azo so only micro noted, but indicative of UTI. Start below. Follow-up per culture.  - Urine Microscopic  - Urine Culture       BMI:   Estimated body mass index is 27.98 kg/m  as calculated from the following:    Height as of 1/20/22: 1.575 m (5' 2\").    Weight as of this encounter: 69.4 kg (153 lb).           Return in about 1 week (around 9/1/2022).    Clemente Weinstein PA-C  [facility] JEROME Marvin is a 72 year old, presenting for the following health issues:  UTI      History of Present Illness       Reason for visit:  Possible bladder infection  Symptom onset:  Today  Symptoms include:  Often ruining burning very painful  Symptom intensity:  Moderate  Symptom progression:  Staying the same  Had these symptoms before:  Yes  Has tried/received treatment for these symptoms:  Yes  Previous treatment was successful:  Yes  Prior treatment description:  Antibiotic  What makes it worse:  No  What makes it better:  Azo    She eats 2-3 servings of fruits and vegetables daily.She consumes 0 sweetened beverage(s) daily.She exercises with enough effort to increase her heart rate 20 to 29 minutes per day.  She exercises with enough effort to increase her heart rate 3 or less days per week.   She is taking medications regularly.     Shavonne Lundberg is a 72 year old female who presents today for NEW ONSET URINARY CHANGES  Woke in the middle of last night and had some urgency/hesitancy  Then when she was able to go had very painful urination/hesitancy  Did see some blood as well  No hx of kidney stones  No fever  Did start some azo; does think that helped      Review of Systems   Constitutional, HEENT, cardiovascular, pulmonary, gi and gu systems are negative, except as otherwise noted.      Objective    /76 (BP Location: Right arm, Patient Position: Sitting, Cuff " Size: Adult Regular)   Pulse 99   Temp 99.7  F (37.6  C) (Oral)   Resp 16   Wt 69.4 kg (153 lb)   SpO2 97%   BMI 27.98 kg/m    Body mass index is 27.98 kg/m .  Physical Exam   GENERAL: healthy, alert and no distress  RESP: lungs clear to auscultation - no rales, rhonchi or wheezes  CV: regular rates and rhythm  ABDOMEN: soft, nontender, no hepatosplenomegaly, no masses and bowel sounds normal  BACK: no CVA tenderness    Results for orders placed or performed in visit on 08/25/22 (from the past 24 hour(s))   Urine Microscopic   Result Value Ref Range    Bacteria Urine Moderate (A) None Seen /HPF    RBC Urine >100 (A) 0-2 /HPF /HPF    WBC Urine >100 (A) 0-5 /HPF /HPF    Squamous Epithelials Urine Few (A) None Seen /LPF    WBC Clumps Urine Present (A) None Seen /HPF    Narrative    Color orange   Clarity Cloudy                   .  ..

## 2022-08-27 LAB
BACTERIA UR CULT: ABNORMAL
BACTERIA UR CULT: ABNORMAL

## 2022-10-16 ENCOUNTER — HEALTH MAINTENANCE LETTER (OUTPATIENT)
Age: 72
End: 2022-10-16

## 2022-11-28 DIAGNOSIS — I10 BENIGN ESSENTIAL HYPERTENSION: ICD-10-CM

## 2022-11-29 RX ORDER — IRBESARTAN 150 MG/1
TABLET ORAL
Qty: 100 TABLET | Refills: 0 | Status: SHIPPED | OUTPATIENT
Start: 2022-11-29 | End: 2023-02-23

## 2022-11-29 NOTE — TELEPHONE ENCOUNTER
Medication is being filled for 1 time refill only due to:  Patient needs to be seen because annual PCP visit due/BP labs.  Prescription approved per [facility] Refill Protocol.  Routed to  for scheduling  Leah Prather RN, BSN  [facility]

## 2022-12-03 DIAGNOSIS — I47.10 SUPRAVENTRICULAR TACHYCARDIA (H): ICD-10-CM

## 2022-12-05 RX ORDER — DILTIAZEM HYDROCHLORIDE 240 MG/1
CAPSULE, COATED, EXTENDED RELEASE ORAL
Qty: 100 CAPSULE | Refills: 2 | Status: SHIPPED | OUTPATIENT
Start: 2022-12-05 | End: 2023-03-02

## 2022-12-05 NOTE — TELEPHONE ENCOUNTER
Prescription approved per [facility] Refill Protocol.  Leah Prather RN, BSN  [facility]     rashi

## 2022-12-06 ENCOUNTER — VIRTUAL VISIT (OUTPATIENT)
Dept: FAMILY MEDICINE | Facility: CLINIC | Age: 72
End: 2022-12-06
Payer: COMMERCIAL

## 2022-12-06 DIAGNOSIS — J01.90 ACUTE SINUSITIS WITH SYMPTOMS > 10 DAYS: Primary | ICD-10-CM

## 2022-12-06 PROCEDURE — 99213 OFFICE O/P EST LOW 20 MIN: CPT | Mod: 95 | Performed by: PHYSICIAN ASSISTANT

## 2022-12-06 RX ORDER — FLUTICASONE PROPIONATE 50 MCG
1 SPRAY, SUSPENSION (ML) NASAL DAILY
Qty: 9.9 ML | Refills: 0 | Status: SHIPPED | OUTPATIENT
Start: 2022-12-06 | End: 2023-02-01

## 2022-12-06 NOTE — PATIENT INSTRUCTIONS
Sinusitis (Antibiotic Treatment)    The sinuses are air-filled spaces within the bones of the face. They connect to the inside of the nose. Sinusitis is an inflammation of the tissue that lines the sinuses. Sinusitis can occur during a cold. It can also happen due to allergies to pollens and other particles in the air. Sinusitis can cause symptoms of sinus congestion and a feeling of fullness. A sinus infection causes fever, headache, and facial pain. There is often green or yellow fluid draining from the nose or into the back of the throat (post-nasal drip). You have been given antibiotics to treat this condition.   Home care    Take the full course of antibiotics as instructed. Don't stop taking them, even when you feel better.    Drink plenty of water, hot tea, and other liquids as directed by the healthcare provider. This may help thin nasal mucus. It also may help your sinuses drain fluids.    Heat may help soothe painful areas of your face. Use a towel soaked in hot water. Or,  the shower and direct the warm spray onto your face. Using a vaporizer along with a menthol rub at night may also help soothe symptoms.     An expectorant with guaifenesin may help thin nasal mucus and help your sinuses drain fluids. Talk with your provider or pharmacists before taking an over-the-counter (OTC) medicine if you have any questions about it or its side effects..    You can use an OTC decongestant, unless a similar medicine was prescribed to you. Nasal sprays work the fastest. Use one that contains phenylephrine or oxymetazoline. First blow your nose gently. Then use the spray. Don't use these medicines more often than directed on the label. If you do, your symptoms may get worse. You may also take pills that contain pseudoephedrine. Don t use products that combine multiple medicines. This is because side effects may be increased. Read labels. You can also ask the pharmacist for help. (People with high blood  pressure should not use decongestants. They can raise blood pressure.) Talk with your provider or pharmacist if you have any questions about the medicine..    OTC antihistamines may help if allergies contributed to your sinusitis. Talk with your provider or pharmacist if you have any questions about the medicine..    Don't use nasal rinses or irrigation during an acute sinus infection, unless your healthcare provider tells you to. Rinsing may spread the infection to other areas in your sinuses.    Use acetaminophen or ibuprofen to control pain, unless another pain medicine was prescribed to you. If you have chronic liver or kidney disease or ever had a stomach ulcer, talk with your healthcare provider before using these medicines. Never give aspirin to anyone under age 18 who is ill with a fever. It may cause severe liver damage.    Don't smoke. This can make symptoms worse.    Follow-up care  Follow up with your healthcare provider, or as advised.   When to seek medical advice  Call your healthcare provider if any of these occur:     Facial pain or headache that gets worse    Stiff neck    Unusual drowsiness or confusion    Swelling of your forehead or eyelids    Symptoms don't go away in 10 days    Vision problems, such as blurred or double vision    Fever of 100.4 F (38 C) or higher, or as directed by your healthcare provider  Call 911  Call 911 if any of these occur:     Seizure    Trouble breathing    Feeling dizzy or faint    Fingernails, skin or lips look blue, purple , or gray  Prevention  Here are steps you can take to help prevent an infection:     Keep good hand washing habits.    Don t have close contact with people who have sore throats, colds, or other upper respiratory infections.    Don t smoke, and stay away from secondhand smoke.    Stay up to date with of your vaccines.  [employer] last reviewed this educational content on 12/1/2019 2000-2021 [employer]. All rights reserved. This  information is not intended as a substitute for professional medical care. Always follow your healthcare professional's instructions.

## 2022-12-06 NOTE — PROGRESS NOTES
"Shavonne is a 72 year old who is being evaluated via a billable telephone visit.      What phone number would you like to be contacted at? [phone]  How would you like to obtain your AVS? [facility]    Assessment & Plan   Problem List Items Addressed This Visit    None  Visit Diagnoses     Acute sinusitis with symptoms > 10 days    -  Primary    Relevant Medications    fluticasone (FLONASE) 50 MCG/ACT nasal spray    amoxicillin-clavulanate (AUGMENTIN) 875-125 MG tablet                    BMI:   Estimated body mass index is 27.98 kg/m  as calculated from the following:    Height as of 1/20/22: 1.575 m (5' 2\").    Weight as of 8/25/22: 69.4 kg (153 lb).           Return in about 2 weeks (around 12/20/2022) for a recheck if you are not improved.    PIERO [facility]    Aileen Marvin is a 72 year old, presenting for the following health issues:  Sinus Problem      HPI    Acute Illness    Onset/Duration: 1 week  Symptoms:  Fever: No  Chills/Sweats: yes  Headache (location?): No  Sinus Pressure: YES  Conjunctivitis:  No  Ear Pain: no  Rhinorrhea: YES  Congestion: No  Sore Throat: No  Cough: no  Wheeze: No  Decreased Appetite: No  Nausea: No  Vomiting: No  Diarrhea: No  Dysuria/Freq.: No  Dysuria or Hematuria: No  Fatigue/Achiness: yes  Sick/Strep Exposure: No  Therapies tried and outcome: tylenol      Review of Systems         Objective    Vitals - Patient Reported  Weight (Patient Reported): 66.7 kg (147 lb)      Vitals:  No vitals were obtained today due to virtual visit.    Physical Exam   healthy, alert and no distress  PSYCH: Alert and oriented times 3; coherent speech, normal   rate and volume, able to articulate logical thoughts, able   to abstract reason, no tangential thoughts, no hallucinations   or delusions  Her affect is normal  RESP: No cough, no audible wheezing, able to talk in full sentences  Remainder of exam unable to be completed due to " telephone visits                Phone call duration: 9 minutes

## 2023-01-19 ENCOUNTER — NURSE TRIAGE (OUTPATIENT)
Dept: NURSING | Facility: CLINIC | Age: 73
End: 2023-01-19
Payer: COMMERCIAL

## 2023-01-19 ENCOUNTER — VIRTUAL VISIT (OUTPATIENT)
Dept: URGENT CARE | Facility: CLINIC | Age: 73
End: 2023-01-19
Payer: COMMERCIAL

## 2023-01-19 DIAGNOSIS — I10 ESSENTIAL HYPERTENSION: ICD-10-CM

## 2023-01-19 DIAGNOSIS — U07.1 CLINICAL DIAGNOSIS OF COVID-19: Primary | ICD-10-CM

## 2023-01-19 PROCEDURE — 99442 PR PHYSICIAN TELEPHONE EVALUATION 11-20 MIN: CPT | Mod: CS

## 2023-01-19 RX ORDER — DILTIAZEM HYDROCHLORIDE 120 MG/1
120 CAPSULE, EXTENDED RELEASE ORAL DAILY
Qty: 5 CAPSULE | Refills: 0 | Status: SHIPPED | OUTPATIENT
Start: 2023-01-19 | End: 2023-03-02

## 2023-01-19 NOTE — TELEPHONE ENCOUNTER
Pt calls back.    Advised patient that will need to have virtual visit with a provider to discuss covid-19 treatment options. Pt verbalized understanding and agrees.    Warm transferred patient to central scheduling to schedule virtual appointment.    Celestina CORNELIUS RN

## 2023-01-19 NOTE — TELEPHONE ENCOUNTER
Has been sick frequently for the past month to a month and a half.    temp of 100.8 that started yesterday    She went to the store 1/17/23 and a friend drove her     covid testing is negative yesterday but today is is showing positive.     Currently has a cough, congestion, runny nose    Did not get her flu shot    Denies any chest pain    Denies shortness of breath    COVID Positive/Requesting COVID treatment    Patient is positive for COVID and requesting treatment options.    Date of positive COVID test (PCR or at home)? 1/19/2023  Current COVID symptoms: fever or chills, cough, fatigue and congestion or runny nose  Date COVID symptoms began: 1/18/2023    Message should be routed to clinic RN pool. Best phone number to use for call back: [phone] (home) ok to leave a detailed message    Pharmacy phone number - [phone]    Lizz Mensah RN  [place] Nurse Advisor  10:54 AM  1/19/2023          Reason for Disposition    HIGH RISK for severe COVID complications (e.g., weak immune system, age > 64 years, obesity with BMI > 25, pregnant, chronic lung disease or other chronic medical condition) (Exception: Already seen by PCP and no new or worsening symptoms.)    Additional Information    Negative: SEVERE difficulty breathing (e.g., struggling for each breath, speaks in single words)    Negative: Bluish (or gray) lips or face    Negative: Shock suspected (e.g., cold/pale/clammy skin, too weak to stand, low BP, rapid pulse)    Negative: Sounds like a life-threatening emergency to the triager    Negative: Headache and stiff neck (can't touch chin to chest)    Negative: Chest pain  (Exception: MILD central chest pain, present only when coughing.)    Negative: Difficulty breathing that is not severe and not relieved by cleaning out the nose    Negative: Patient sounds very sick or weak to the triager    Negative: Fever > 104 F (40 C)    Negative: Fever > 101 F (38.3 C) and over 60 years of age    Negative:  Fever > 100.0 F (37.8 C) and diabetes mellitus or weak immune system (e.g., HIV positive, cancer chemo, splenectomy, organ transplant, chronic steroids)    Negative: Fever > 100.0 F (37.8 C) and bedridden (e.g., nursing home patient, stroke, chronic illness, recovering from surgery)    Negative: SEVERE difficulty breathing (e.g., struggling for each breath, speaks in single words)    Negative: Difficult to awaken or acting confused (e.g., disoriented, slurred speech)    Negative: Bluish (or gray) lips or face now    Negative: Shock suspected (e.g., cold/pale/clammy skin, too weak to stand, low BP, rapid pulse)    Negative: Sounds like a life-threatening emergency to the triager    Negative: SEVERE or constant chest pain or pressure  (Exception: Mild central chest pain, present only when coughing.)    Negative: MODERATE difficulty breathing (e.g., speaks in phrases, SOB even at rest, pulse 100-120)    Negative: Headache and stiff neck (can't touch chin to chest)    Negative: Oxygen level (e.g., pulse oximetry) 90 percent or lower    Negative: Chest pain or pressure    Negative: Patient sounds very sick or weak to the triager    Negative: [1] Fever > 100.0 F (37.8 C) AND [2] bedridden (e.g., nursing home patient, CVA, chronic illness, recovering from surgery)    Negative: [1] Fever > 101 F (38.3 C) AND [2] over 60 years of age    Negative: Fever > 103 F (39.4 C)    Negative: MILD difficulty breathing (e.g., minimal/no SOB at rest, SOB with walking, pulse <100)    Protocols used: CORONAVIRUS (COVID-19) DIAGNOSED OR [id number] 1.18.2022, INFLUENZA - SEASONAL-A-OH

## 2023-01-19 NOTE — TELEPHONE ENCOUNTER
Called pt.  LMTCB.    Clarify onset of symptoms date.      Due to pt on Diltiazem, pt needs virtual visit w/ provider to discuss Covid-19 treatment options.  If pt calls back, please warm transfer to central scheduling to help schedule.    Argentina Davalos RN, BSN  [facility]

## 2023-01-19 NOTE — PROGRESS NOTES
"Shavonne is a 72 year old who is being evaluated via a billable telephone visit.      Tested this AM.  Sx started 1/18.    Fever and cough.  HA.    Not vaccinated.  Third time having COVID.    What phone number would you like to be contacted at? cell  How would you like to obtain your AVS? MyChart    Distant Location (provider location):  Off-site    Assessment & Plan     Clinical diagnosis of COVID-19    - nirmatrelvir and ritonavir (PAXLOVID) therapy pack; Take 3 tablets by mouth 2 times daily for 5 days (Take 2 Nirmatrelvir tablets and 1 Ritonavir tablet twice daily for 5 days)    Essential hypertension    - diltiazem ER (DILT-XR) 120 MG 24 hr capsule; Take 1 capsule (120 mg) by mouth daily While on Paxlovid x 5 [id number]     COVID-19 positive patient.  Encounter for consideration of medication intervention. Patient does qualify for a prescription. Full discussion with patient including medication options, risks and benefits. Potential drug interactions reviewed with patient.     Treatment Planned Paxlovid RX sent to [facility]    Temporary change to home medications:   Hold Flonase nasal spray while on Paxlovid.  Reduce dose of diltiazem ER while on Paxlovid taking 120 mg once daily x 5 days (new rx sent in to pharmacy today)    Estimated body mass index is 27.98 kg/m  as calculated from the following:    Height as of 1/20/22: 1.575 m (5' 2\").    Weight as of 8/25/22: 69.4 kg (153 lb).  GFR Estimate   Date Value Ref Range Status   02/25/2022 >90 >60 mL/min/1.73m2 Final     Comment:     Effective December 21, 2021 eGFRcr in adults is calculated using the 2021 CKD-EPI creatinine equation which includes age and gender (Andrew santana al., NEJM, DOI: 10.1056/AUKTus9535901)   01/07/2021 >90 >60 mL/min/[1.73_m2] Final     Comment:     Non  GFR Calc  Starting 12/18/2018, serum creatinine based estimated GFR (eGFR) will be   calculated using the Chronic Kidney Disease Epidemiology Collaboration "   (CKD-EPI) equation.       Eli Ziegler MD  Virtual Urgent Care  [facility] VIRTUAL URGENT CARE    Aileen Marvin is a 72 year old, presenting for the following health issues:  No chief complaint on file.      HPI     Tested this AM.  Sx started 1/18.    Fever and cough.  HA.    Not vaccinated.  Third time having COVID.      Review of Systems   Constitutional, HEENT, cardiovascular, pulmonary, GI, , musculoskeletal, neuro, skin, endocrine and psych systems are negative, except as otherwise noted.      Objective           Vitals:  No vitals were obtained today due to virtual visit.    Physical Exam   healthy, alert and no distress  PSYCH: Alert and oriented times 3; coherent speech, normal   rate and volume, able to articulate logical thoughts, able   to abstract reason, no tangential thoughts, no hallucinations   or delusions  Her affect is normal and pleasant  RESP: No cough, no audible wheezing, able to talk in full sentences  Remainder of exam unable to be completed due to telephone visits        Phone call duration: 12 minutes

## 2023-02-01 DIAGNOSIS — J01.90 ACUTE SINUSITIS WITH SYMPTOMS > 10 DAYS: ICD-10-CM

## 2023-02-01 RX ORDER — FLUTICASONE PROPIONATE 50 MCG
SPRAY, SUSPENSION (ML) NASAL
Qty: 16 G | Refills: 0 | Status: SHIPPED | OUTPATIENT
Start: 2023-02-01 | End: 2023-03-02

## 2023-03-02 ENCOUNTER — OFFICE VISIT (OUTPATIENT)
Dept: FAMILY MEDICINE | Facility: CLINIC | Age: 73
End: 2023-03-02
Payer: COMMERCIAL

## 2023-03-02 VITALS
HEART RATE: 87 BPM | OXYGEN SATURATION: 99 % | RESPIRATION RATE: 18 BRPM | SYSTOLIC BLOOD PRESSURE: 110 MMHG | WEIGHT: 143 LBS | BODY MASS INDEX: 25.34 KG/M2 | TEMPERATURE: 97.8 F | DIASTOLIC BLOOD PRESSURE: 64 MMHG | HEIGHT: 63 IN

## 2023-03-02 DIAGNOSIS — Z00.00 ENCOUNTER FOR MEDICARE ANNUAL WELLNESS EXAM: Primary | ICD-10-CM

## 2023-03-02 DIAGNOSIS — R07.89 OTHER CHEST PAIN: ICD-10-CM

## 2023-03-02 DIAGNOSIS — R29.818 SUSPECTED SLEEP APNEA: ICD-10-CM

## 2023-03-02 DIAGNOSIS — R40.0 HAS DAYTIME DROWSINESS: ICD-10-CM

## 2023-03-02 DIAGNOSIS — Z78.0 ASYMPTOMATIC MENOPAUSAL STATE: ICD-10-CM

## 2023-03-02 DIAGNOSIS — I10 BENIGN ESSENTIAL HYPERTENSION: ICD-10-CM

## 2023-03-02 DIAGNOSIS — I47.10 SUPRAVENTRICULAR TACHYCARDIA (H): ICD-10-CM

## 2023-03-02 DIAGNOSIS — E78.5 HYPERLIPIDEMIA WITH TARGET LDL LESS THAN 130: ICD-10-CM

## 2023-03-02 DIAGNOSIS — Z12.11 SCREENING FOR COLON CANCER: ICD-10-CM

## 2023-03-02 LAB
ALBUMIN SERPL BCG-MCNC: 4.3 G/DL (ref 3.5–5.2)
ALP SERPL-CCNC: 79 U/L (ref 35–104)
ALT SERPL W P-5'-P-CCNC: 21 U/L (ref 10–35)
ANION GAP SERPL CALCULATED.3IONS-SCNC: 13 MMOL/L (ref 7–15)
AST SERPL W P-5'-P-CCNC: 23 U/L (ref 10–35)
BILIRUB SERPL-MCNC: 0.4 MG/DL
BUN SERPL-MCNC: 16.2 MG/DL (ref 8–23)
CALCIUM SERPL-MCNC: 9.8 MG/DL (ref 8.8–10.2)
CHLORIDE SERPL-SCNC: 102 MMOL/L (ref 98–107)
CHOLEST SERPL-MCNC: 199 MG/DL
CREAT SERPL-MCNC: 0.55 MG/DL (ref 0.51–0.95)
CREAT UR-MCNC: 97.9 MG/DL
DEPRECATED HCO3 PLAS-SCNC: 25 MMOL/L (ref 22–29)
GFR SERPL CREATININE-BSD FRML MDRD: >90 ML/MIN/1.73M2
GLUCOSE SERPL-MCNC: 96 MG/DL (ref 70–99)
HDLC SERPL-MCNC: 46 MG/DL
LDLC SERPL CALC-MCNC: 137 MG/DL
MICROALBUMIN UR-MCNC: 16.5 MG/L
MICROALBUMIN/CREAT UR: 16.85 MG/G CR (ref 0–25)
NONHDLC SERPL-MCNC: 153 MG/DL
POTASSIUM SERPL-SCNC: 4.3 MMOL/L (ref 3.4–5.3)
PROT SERPL-MCNC: 7.2 G/DL (ref 6.4–8.3)
SODIUM SERPL-SCNC: 140 MMOL/L (ref 136–145)
TRIGL SERPL-MCNC: 81 MG/DL

## 2023-03-02 PROCEDURE — 82570 ASSAY OF URINE CREATININE: CPT | Performed by: PHYSICIAN ASSISTANT

## 2023-03-02 PROCEDURE — 99214 OFFICE O/P EST MOD 30 MIN: CPT | Mod: 25 | Performed by: PHYSICIAN ASSISTANT

## 2023-03-02 PROCEDURE — G0439 PPPS, SUBSEQ VISIT: HCPCS | Performed by: PHYSICIAN ASSISTANT

## 2023-03-02 PROCEDURE — 36415 COLL VENOUS BLD VENIPUNCTURE: CPT | Performed by: PHYSICIAN ASSISTANT

## 2023-03-02 PROCEDURE — 80053 COMPREHEN METABOLIC PANEL: CPT | Performed by: PHYSICIAN ASSISTANT

## 2023-03-02 PROCEDURE — 80061 LIPID PANEL: CPT | Performed by: PHYSICIAN ASSISTANT

## 2023-03-02 PROCEDURE — 82043 UR ALBUMIN QUANTITATIVE: CPT | Performed by: PHYSICIAN ASSISTANT

## 2023-03-02 RX ORDER — DILTIAZEM HYDROCHLORIDE 240 MG/1
240 CAPSULE, COATED, EXTENDED RELEASE ORAL DAILY
Qty: 90 CAPSULE | Refills: 3 | Status: SHIPPED | OUTPATIENT
Start: 2023-03-02 | End: 2024-03-22

## 2023-03-02 RX ORDER — IRBESARTAN 150 MG/1
150 TABLET ORAL DAILY
Qty: 90 TABLET | Refills: 3 | Status: SHIPPED | OUTPATIENT
Start: 2023-03-02

## 2023-03-02 RX ORDER — NITROGLYCERIN 0.4 MG/1
TABLET SUBLINGUAL
Qty: 30 TABLET | Refills: 1 | Status: SHIPPED | OUTPATIENT
Start: 2023-03-02

## 2023-03-02 ASSESSMENT — ENCOUNTER SYMPTOMS
COUGH: 0
PARESTHESIAS: 0
WEAKNESS: 0
PALPITATIONS: 0
DIARRHEA: 0
FREQUENCY: 0
ARTHRALGIAS: 0
CHILLS: 0
JOINT SWELLING: 0
FEVER: 0
ABDOMINAL PAIN: 0
DYSURIA: 0
HEARTBURN: 0
EYE PAIN: 0
SHORTNESS OF BREATH: 0
CONSTIPATION: 0
DIZZINESS: 0
NERVOUS/ANXIOUS: 0
NAUSEA: 0
MYALGIAS: 0
SORE THROAT: 0
HEMATOCHEZIA: 0
HEMATURIA: 0
HEADACHES: 0

## 2023-03-02 ASSESSMENT — ACTIVITIES OF DAILY LIVING (ADL): CURRENT_FUNCTION: NO ASSISTANCE NEEDED

## 2023-03-02 ASSESSMENT — PAIN SCALES - GENERAL: PAINLEVEL: NO PAIN (0)

## 2023-03-02 NOTE — PROGRESS NOTES
"SUBJECTIVE:   Shavonne is a 72 year old who presents for Preventive Visit.      Patient has been advised of split billing requirements and indicates understanding: Yes       Are you in the first 12 months of your Medicare coverage?  No    Healthy Habits:     In general, how would you rate your overall health?  Good    Frequency of exercise:  2-3 days/week    Duration of exercise:  15-30 minutes    Do you usually eat at least 4 servings of fruit and vegetables a day, include whole grains    & fiber and avoid regularly eating high fat or \"junk\" foods?  Yes    Taking medications regularly:  Yes    Medication side effects:  None    Ability to successfully perform activities of daily living:  No assistance needed    Home Safety:  No safety concerns identified    Hearing Impairment:  Difficulty following a conversation in a noisy restaurant or crowded room, need to ask people to speak up or repeat themselves and difficulty understanding soft or whispered speech    In the past 6 months, have you been bothered by leaking of urine?  No    In general, how would you rate your overall mental or emotional health?  Excellent      PHQ-2 Total Score: 0    Additional concerns today:  No     Shavonne Lundberg is a 72 year old female who presents today for annual check up  She feels healthy  She is active at home; \"lots of cleaning and rarely sitting\"  Sleeping well; can fall back asleep easily  On chart review, in hospital during COVID there was some suspicion for JEROMY?  Some daytime tiredness, though energy levels ok    Have you ever done Advance Care Planning? (For example, a Health Directive, POLST, or a discussion with a medical provider or your loved ones about your wishes): Yes, patient states has an Advance Care Planning document and will bring a copy to the clinic.       Fall risk  Fallen 2 or more times in the past year?: No  Any fall with injury in the past year?: No        Cognitive Screening   1) Repeat 3 items (Leader, " Season, Table)    2) Clock draw: NORMAL  3) 3 item recall: Recalls 3 objects  Results: 3 items recalled: COGNITIVE IMPAIRMENT LESS LIKELY    Mini-CogTM Copyright YURIDIA Abrams. Licensed by the author for use in [facility]; reprinted with permission (heidi@[facility]). All rights reserved.      Do you have sleep apnea, excessive snoring or daytime drowsiness?: yes    Reviewed and updated as needed this visit by clinical staff   Tobacco  Allergies               Reviewed and updated as needed this visit by Provider                 Social History     Tobacco Use     Smoking status: Former     Passive exposure: Never     Smokeless tobacco: Never     Tobacco comments:     quit in 1985   Substance Use Topics     Alcohol use: No     Alcohol/week: 0.0 standard drinks         Alcohol Use 3/2/2023   Prescreen: >3 drinks/day or >7 drinks/week? Not Applicable         Current providers sharing in care for this patient include:   Patient Care Team:  Juanis Davison MD as PCP - General (Internal Medicine)  Juanis Davison MD as Assigned PCP    The following health maintenance items are reviewed in Epic and correct as of today:  Health Maintenance   Topic Date Due     DEXA  Never done     COVID-19 Vaccine (1) Never done     ZOSTER IMMUNIZATION (1 of 2) Never done     LUNG CANCER SCREENING  Never done     INFLUENZA VACCINE (1) 09/01/2022     DTAP/TDAP/TD IMMUNIZATION (2 - Td or Tdap) 09/06/2022     ANNUAL REVIEW OF HM ORDERS  12/07/2022     MEDICARE ANNUAL WELLNESS VISIT  01/20/2023     MAMMO SCREENING  02/25/2024     FALL RISK ASSESSMENT  03/02/2024     COLORECTAL CANCER SCREENING  12/01/2025     ADVANCE CARE PLANNING  01/21/2027     LIPID  02/25/2027     HEPATITIS C SCREENING  Completed     PHQ-2 (once per calendar year)  Completed     Pneumococcal Vaccine: 65+ Years  Completed     IPV IMMUNIZATION  Aged Out     MENINGITIS IMMUNIZATION  Aged Out     Lab work is in process  Labs reviewed in EPIC  Vaccines:  "defers    Review of Systems   Constitutional: Negative for chills and fever.   HENT: Negative for congestion, ear pain, hearing loss and sore throat.    Eyes: Negative for pain and visual disturbance.   Respiratory: Negative for cough and shortness of breath.    Cardiovascular: Negative for chest pain, palpitations and peripheral edema.   Gastrointestinal: Negative for abdominal pain, constipation, diarrhea, heartburn, hematochezia and nausea.   Genitourinary: Negative for dysuria, frequency, genital sores, hematuria and urgency.   Musculoskeletal: Negative for arthralgias, joint swelling and myalgias.   Skin: Negative for rash.   Neurological: Negative for dizziness, weakness, headaches and paresthesias.   Psychiatric/Behavioral: Negative for mood changes. The patient is not nervous/anxious.        OBJECTIVE:   /64 (BP Location: Right arm, Patient Position: Sitting, Cuff Size: Adult Regular)   Pulse 87   Temp 97.8  F (36.6  C) (Tympanic)   Resp 18   Ht 1.6 m (5' 3\")   Wt 64.9 kg (143 lb)   SpO2 99%   BMI 25.33 kg/m   Estimated body mass index is 25.33 kg/m  as calculated from the following:    Height as of this encounter: 1.6 m (5' 3\").    Weight as of this encounter: 64.9 kg (143 lb).  Physical Exam  GENERAL: healthy, alert and no distress  EYES: Eyes grossly normal to inspection, PERRL and conjunctivae and sclerae normal  HENT: ear canals and TM's normal, nose and mouth without ulcers or lesions  NECK: no adenopathy, no asymmetry, masses, or scars and thyroid normal to palpation  RESP: lungs clear to auscultation - no rales, rhonchi or wheezes  CV: regular rate and rhythm, normal S1 S2, no S3 or S4, no murmur, click or rub, no peripheral edema and peripheral pulses strong  ABDOMEN: soft, nontender, no hepatosplenomegaly, no masses and bowel sounds normal  MS: no gross musculoskeletal defects noted, no edema  SKIN: no suspicious lesions or rashes  PSYCH: mentation appears normal, affect " normal/bright    Diagnostic Test Results:  Labs reviewed in Epic  Updating today     ASSESSMENT / PLAN:   1. Encounter for Medicare annual wellness exam  Reviewed personal and family history. Reviewed age appropriate screenings. Recommended any needed vaccinations. She defers. She'd like to defer mammography today but consider next year.     2. Benign essential hypertension  Controlled. Continue present management.   - irbesartan (AVAPRO) 150 MG tablet; Take 1 tablet (150 mg) by mouth daily  Dispense: 90 tablet; Refill: 3  - Comprehensive metabolic panel (BMP + Alb, Alk Phos, ALT, AST, Total. Bili, TP); Future  - Albumin Random Urine Quantitative with Creat Ratio; Future  - Comprehensive metabolic panel (BMP + Alb, Alk Phos, ALT, AST, Total. Bili, TP)  - Albumin Random Urine Quantitative with Creat Ratio    3. Supraventricular tachycardia (H)  Controlled. Rarely experiencing any palpitations  - diltiazem ER COATED BEADS (CARDIZEM CD/CARTIA XT) 240 MG 24 hr capsule; Take 1 capsule (240 mg) by mouth daily  Dispense: 90 capsule; Refill: 3    4. Has daytime drowsiness  5. Suspected sleep apnea  Do recommend screening especially with symptoms and in chart review of her recent hospitalization.   - Adult Sleep Eval & Management  Referral; Future      6. Hyperlipidemia with target LDL less than 130  - Lipid panel reflex to direct LDL Fasting; Future  - Lipid panel reflex to direct LDL Fasting    7. Other chest pain  Likes to have on hand but has not used in many many years.   - nitroGLYcerin (NITROSTAT) 0.4 MG sublingual tablet; For chest pain place 1 tablet under the tongue every 5 minutes for 3 doses. If symptoms persist 5 minutes after 1st dose call 911.  Dispense: 30 tablet; Refill: 1    8. Screening for colon cancer  After discussion she would like to do the fit test.   - Fecal colorectal cancer screen (FIT); Future  - Fecal colorectal cancer screen (FIT)    9. Asymptomatic menopausal state  She does not  prefer to obtain Dexa at this time. Mentions getting plenty of D and Ca in diet/supplements.         COUNSELING:  Reviewed preventive health counseling, as reflected in patient instructions        She reports that she has quit smoking. She has never been exposed to tobacco smoke. She has never used smokeless tobacco.      Appropriate preventive services were discussed with this patient, including applicable screening as appropriate for cardiovascular disease, diabetes, osteopenia/osteoporosis, and glaucoma.  As appropriate for age/gender, discussed screening for colorectal cancer, prostate cancer, breast cancer, and cervical cancer. Checklist reviewing preventive services available has been given to the patient.    Reviewed patients plan of care and provided an AVS. The Basic Care Plan (routine screening as documented in Health Maintenance) for Shavonne meets the Care Plan requirement. This Care Plan has been established and reviewed with the Patient.          Clemente Weinstein PA-C  [facility]    Identified Health Risks:

## 2023-03-02 NOTE — PATIENT INSTRUCTIONS
Patient Education   Personalized Prevention Plan  You are due for the preventive services outlined below.  Your care team is available to assist you in scheduling these services.  If you have already completed any of these items, please share that information with your care team to update in your medical record.  Health Maintenance Due   Topic Date Due     Osteoporosis Screening  Never done     COVID-19 Vaccine (1) Never done     Zoster (Shingles) Vaccine (1 of 2) Never done     LUNG CANCER SCREENING  Never done     Flu Vaccine (1) 09/01/2022     Diptheria Tetanus Pertussis (DTAP/TDAP/TD) Vaccine (2 - Td or Tdap) 09/06/2022     ANNUAL REVIEW OF HM ORDERS  12/07/2022     Annual Wellness Visit  01/20/2023        Patient Education   Personalized Prevention Plan  You are due for the preventive services outlined below.  Your care team is available to assist you in scheduling these services.  If you have already completed any of these items, please share that information with your care team to update in your medical record.  Health Maintenance Due   Topic Date Due     Osteoporosis Screening  Never done     COVID-19 Vaccine (1) Never done     Zoster (Shingles) Vaccine (1 of 2) Never done     LUNG CANCER SCREENING  Never done     Flu Vaccine (1) 09/01/2022     Diptheria Tetanus Pertussis (DTAP/TDAP/TD) Vaccine (2 - Td or Tdap) 09/06/2022     Annual Wellness Visit  01/20/2023

## 2023-03-29 NOTE — TELEPHONE ENCOUNTER
Natacha, nurse at the [facility], notes that Giacomo gets agitated at times, but she has not noted anything unusual. Notes that pt is on Mucinex in the past few days for cold symptoms, but had not noticed any threatening or aggressive behavior.     Pt's sister wants Dr Wagner to know of her concerns about Giacomo as noted 03/27/2023. .    Reason for call:  Symptom   Symptom or request: Pt fell and hit head    Duration (how long have symptoms been present): couple hrs  Have you been treated for this before? No    Additional comments: pt stated there is a large lump and pain near her eye. Wanting to know if she should be seen or what she should watch out for.     There were no in clinic appts available today.    Phone number to reach patient:  Home number on file [phone] (home)    Best Time:  any    Can we leave a detailed message on this number?  YES    Travel screening: Not Applicable

## 2023-04-24 ENCOUNTER — OFFICE VISIT (OUTPATIENT)
Dept: FAMILY MEDICINE | Facility: CLINIC | Age: 73
End: 2023-04-24
Payer: COMMERCIAL

## 2023-04-24 VITALS
OXYGEN SATURATION: 98 % | TEMPERATURE: 99 F | HEART RATE: 60 BPM | DIASTOLIC BLOOD PRESSURE: 70 MMHG | BODY MASS INDEX: 25.52 KG/M2 | RESPIRATION RATE: 18 BRPM | HEIGHT: 63 IN | WEIGHT: 144 LBS | SYSTOLIC BLOOD PRESSURE: 128 MMHG

## 2023-04-24 DIAGNOSIS — L25.5 CONTACT DERMATITIS DUE TO PLANTS, EXCEPT FOOD, UNSPECIFIED CONTACT DERMATITIS TYPE: Primary | ICD-10-CM

## 2023-04-24 PROCEDURE — 99213 OFFICE O/P EST LOW 20 MIN: CPT | Performed by: PHYSICIAN ASSISTANT

## 2023-04-24 RX ORDER — TRIAMCINOLONE ACETONIDE 1 MG/G
CREAM TOPICAL 2 TIMES DAILY
Qty: 45 G | Refills: 0 | Status: SHIPPED | OUTPATIENT
Start: 2023-04-24 | End: 2024-03-22

## 2023-04-24 RX ORDER — PREDNISONE 5 MG/1
5 TABLET ORAL DAILY
Qty: 5 TABLET | Refills: 0 | Status: SHIPPED | OUTPATIENT
Start: 2023-04-24 | End: 2024-03-22

## 2023-04-24 RX ORDER — CEPHALEXIN 500 MG/1
500 CAPSULE ORAL 2 TIMES DAILY
Qty: 14 CAPSULE | Refills: 0 | Status: SHIPPED | OUTPATIENT
Start: 2023-04-24 | End: 2024-03-22

## 2023-04-24 ASSESSMENT — PAIN SCALES - GENERAL: PAINLEVEL: NO PAIN (0)

## 2023-04-24 NOTE — PROGRESS NOTES
"  Assessment & Plan     Contact dermatitis due to plants, except food, unspecified contact dermatitis type  Continue home care treatments and initiate below. Close follow up if not improving  - predniSONE (DELTASONE) 5 MG tablet; Take 1 tablet (5 mg) by mouth daily  - triamcinolone (KENALOG) 0.1 % external cream; Apply topically 2 times daily For 1 weekd  - cephALEXin (KEFLEX) 500 MG capsule; Take 1 capsule (500 mg) by mouth 2 times daily      Clemente Weinstein PA-C  [facility] LUCYMOJERRY Marvin is a 72 year old, presenting for the following health issues:  Derm Problem        4/24/2023     9:54 AM   Additional Questions   Roomed by Analilia BURR   Accompanied by Self         4/24/2023     9:54 AM   Patient Reported Additional Medications   Patient reports taking the following new medications vitamin K     History of Present Illness       Reason for visit:  Poison ivy  Symptom onset:  3-7 days agoShe consumes 1 sweetened beverage(s) daily.She exercises with enough effort to increase her heart rate 10 to 19 minutes per day.  She exercises with enough effort to increase her heart rate 5 days per week.   She is taking medications regularly.     Shavonne Lundberg is a 72 year old female who presents today for new onset rash  She was in [place] recently and was helping with some gardening and broke out in a rash soon after  It is intensely itchy  Developed blisters quickly after   Now spreading to face; more redness  Getting some increasing rash on right arm  Continues home cares    Review of Systems   Constitutional, HEENT, cardiovascular, pulmonary, gi and gu systems are negative, except as otherwise noted.      Objective    /70 (BP Location: Left arm, Patient Position: Sitting, Cuff Size: Adult Regular)   Pulse 60   Temp 99  F (37.2  C) (Oral)   Resp 18   Ht 1.6 m (5' 3\")   Wt 65.3 kg (144 lb)   SpO2 98%   BMI 25.51 kg/m    Body mass index is 25.51 kg/m .  Physical Exam "   GENERAL: healthy, alert and no distress  SKIN: bilateral arms show multiple vesicular lesions, excoriation and a warm erythematous patch just proximal to the antecubital fossa. Along the right cheek and fore head is more erythema, and vesicular lesions as well as behind the left ear

## 2023-10-18 DIAGNOSIS — I10 BENIGN ESSENTIAL HYPERTENSION: ICD-10-CM

## 2023-10-19 RX ORDER — IRBESARTAN 150 MG/1
150 TABLET ORAL DAILY
Qty: 100 TABLET | Refills: 2 | OUTPATIENT
Start: 2023-10-19

## 2023-12-06 ENCOUNTER — NURSE TRIAGE (OUTPATIENT)
Dept: NURSING | Facility: CLINIC | Age: 73
End: 2023-12-06
Payer: COMMERCIAL

## 2023-12-06 NOTE — TELEPHONE ENCOUNTER
Please triage upper back/shoulder pain a little further. Is this new or ongoing? Is she having chest pain, shortness of breath, or any other concerning symptoms?     Thanks!  Paula Bhakta PA-C

## 2023-12-06 NOTE — TELEPHONE ENCOUNTER
Called pt and advised of below per Paula Bhakta.  Pt verbalized understanding and agreeable to plan.  Pt denied symptoms needing ED per CO[facility].  Scheduled appt to be seen in [place] on 12/7/23.    Argentina Davalos RN, BSN  [facility]

## 2023-12-06 NOTE — TELEPHONE ENCOUNTER
Nurse Triage SBAR    Is this a 2nd Level Triage? YES, LICENSED PRACTITIONER REVIEW IS REQUIRED    Situation: Patient calling with periodic pain in groin and lower back and ongoing upper back/shoulder pain. Protocol advises since pain is radiating in to the groin for patient to go to ED/UCC now (or to office with PCP approval). Routing message to provider to advise.  Consent: not needed    Background: Patient has noticed pain in groin area off and on when lifting leg up steps or getting into a car. Pain wraps around to lower back and causes weakness in her leg  Patient also has persistent pain in upper back and shoulders  History of Hernia surgery    Assessment:   Groin area pain - no pain when sitting  Ache in back when sitting - when standing pain goes up to 5/10  Upper back and shoulder pain - persistent and at time unbearable - rates pain 5/10 now    Protocol Recommended Disposition:   Go to ED/UCC Now (or to office with PCP approval)    Recommendation: Advised patient to wait for call-back after routing message to provider. Care advice given including when to call back. Patient verbalized understanding and agreed with plan.     Routing to provider to review and advise.    Does the patient meet one of the following criteria for ADS visit consideration? 16+ years old, with an [facility] PCP     TIP  Providers, please consider if this condition is appropriate for management at one of our Acute and Diagnostic Services sites.     If patient is a good candidate, please use dotphrase <dot>triageresponse and select Refer to ADS to document.     Ilene Feliz RN [facility] 12/6/2023 11:26 AM    Reason for Disposition   Pain radiates into groin, scrotum    Additional Information   Negative: Passed out (i.e., fainted, collapsed and was not responding)   Negative: Shock suspected (e.g., cold/pale/clammy skin, too weak to stand, low BP, rapid pulse)   Negative: Sounds like a life-threatening emergency to the  triager   Negative: Major injury to the back (e.g., MVA, fall > 10 feet or 3 meters, penetrating injury, etc.)   Negative: Pain in the upper back over the ribs (rib cage) that radiates (travels) into the chest   Negative: Pain in the upper back over the ribs (rib cage) and worsened by coughing (or clearly increases with breathing)   Negative: Back pain during pregnancy   Negative: SEVERE abdominal pain (e.g., excruciating)   Negative: SEVERE back pain of sudden onset and age > 60 years   Negative: Abdominal pain and age > 60 years   Negative: Unable to urinate (or only a few drops) and bladder feels very full   Negative: Loss of bladder or bowel control (urine or bowel incontinence; wetting self, leaking stool) of new-onset   Negative: Numbness (loss of sensation) in groin or rectal area    Protocols used: Back Pain-A-[place]

## 2023-12-06 NOTE — TELEPHONE ENCOUNTER
Could be seen in clinic in next 1-2 days if openings, or could go to urgent care. ER if severe pain, worsening leg weakness, loss of bladder or bowel control, leg numbness/tingling, or other concerns.    Paula Bhakta PA-C

## 2023-12-06 NOTE — TELEPHONE ENCOUNTER
Groin has flared up 2-3 other times, when lifting legs to go up stairs leg is weakened and causes more pain.  Pain cream helps relieve.  This episode started 12/2, got worse on 12/5/23 evening.  Was on feet shopping a lot yesterday.    When first stands up now is getting back pain too on right side.  Started on 12/2/23.    Shoulder pain has been going for years.  Did heavy lifting for work, sees chiropractor for this.  Notices more in winter months.    Denies chest pain and shortness of breath.  Denies other concerning symptoms.    Routed to Paula Bhakta, please advise on when/where pt to be seen.    Argentina Davalos RN, BSN  [facility]

## 2023-12-08 DIAGNOSIS — I47.10 SUPRAVENTRICULAR TACHYCARDIA (H): ICD-10-CM

## 2023-12-11 RX ORDER — DILTIAZEM HYDROCHLORIDE 240 MG/1
240 CAPSULE, COATED, EXTENDED RELEASE ORAL DAILY
Qty: 100 CAPSULE | Refills: 2 | OUTPATIENT
Start: 2023-12-11

## 2023-12-14 DIAGNOSIS — I10 BENIGN ESSENTIAL HYPERTENSION: ICD-10-CM

## 2023-12-15 RX ORDER — IRBESARTAN 150 MG/1
150 TABLET ORAL DAILY
Qty: 60 TABLET | Refills: 5 | OUTPATIENT
Start: 2023-12-15

## 2024-01-26 ENCOUNTER — PATIENT OUTREACH (OUTPATIENT)
Dept: CARE COORDINATION | Facility: CLINIC | Age: 74
End: 2024-01-26
Payer: COMMERCIAL

## 2024-02-01 ENCOUNTER — PATIENT OUTREACH (OUTPATIENT)
Dept: CARE COORDINATION | Facility: CLINIC | Age: 74
End: 2024-02-01
Payer: COMMERCIAL

## 2024-02-15 ENCOUNTER — PATIENT OUTREACH (OUTPATIENT)
Dept: CARE COORDINATION | Facility: CLINIC | Age: 74
End: 2024-02-15
Payer: COMMERCIAL

## 2024-03-18 ENCOUNTER — NURSE TRIAGE (OUTPATIENT)
Dept: FAMILY MEDICINE | Facility: CLINIC | Age: 74
End: 2024-03-18
Payer: COMMERCIAL

## 2024-03-18 NOTE — TELEPHONE ENCOUNTER
Pt calls.    She broke out in a red rash on her arms that is really itchy.  She thinks she is having an allergic reaction to something.  She says she has a hx of tachycardia so she is wondering what to take - she does not think benadryl would be good for her.  NO problems with breathing.  NO swelling of tongue or lips.  She is not sure if it is hives?  She is currently dog sitting.  She is wondering if the dogs are causing the rash on her arms.  The rash started yesterday.      Home care advice given.   Per care advise - advised she could try hydrocortisone cream - be seen if worse or if needing to use longer than 7 days.  Try not to scratch.  See care advice section for full advisal.      Reason for Disposition   Mild localized rash    Additional Information   Negative: Sounds like a life-threatening emergency to the triager   Negative: Athlete's Foot suspected (i.e., itchy rash between the toes)   Negative: Insect bite(s) suspected   Negative: Jock Itch suspected (i.e., itchy rash on inner thighs near genital area)   Negative: Localized lump (or swelling) without redness or rash   Negative: MPOX SUSPECTED (e.g., direct skin contact such as sex, recent travel to [place] or [place]) and any SYMPTOMS OF MPOX (e.g., rash, fever, muscle aches, or swollen lymph nodes)   Negative: At risk for Mpox (men-who-have-sex-with-men) and possible exposure (e.g., multiple sex partners in past 21 days) and ANY SYMPTOMS OF MPOX (e.g., rash, fever, muscle aches, or swollen lymph nodes)   Negative: Poison ivy, oak, or sumac rash suspected (e.g., itchy rash after contact with poison ivy)   Negative: Rash of female genital area (e.g., labia, vagina, vulva)   Negative: Rash of male genital area (e.g., penis, scrotum)   Negative: Redness of immunization site   Negative: Shingles suspected (i.e., painful rash, multiple small blisters grouped together in one area of body; dermatomal distribution)   Negative: Small spot, skin growth, or  "mole   Negative: Wound infection suspected (i.e., pain, spreading redness, or pus; in a cut, puncture, scrape or sutured wound)   Negative: Fever and localized purple or blood-colored spots or dots that are not from injury or friction   Negative: Fever and localized rash is very painful   Negative: Patient sounds very sick or weak to the triager   Negative: Looks like a boil, infected sore, deep ulcer, or other infected rash (spreading redness, pus)   Negative: Painful rash with multiple small blisters grouped together (i.e., dermatomal distribution or 'band' or 'stripe')   Negative: Localized rash is very painful (no fever)   Negative: Localized purple or blood-colored spots or dots that are not from injury or friction (no fever)   Negative: Lyme disease suspected (e.g., bull's-eye rash or tick bite / exposure)   Negative: Patient wants to be seen   Negative: Tender bumps in armpits   Negative: Pimples (localized) and no improvement after using CARE ADVICE   Negative: SEVERE local itching persists after 2 days of steroid cream   Negative: Applying cream or ointment and it causes severe itch, burning, or pain   Negative: Medication patch causing local rash or itching   Negative: Localized rash present > 7 days   Negative: Red, moist, irritated area between skin folds (or under larger breasts)   Negative: Localized area of skin darkening or thickening on lower legs or ankles and has NOT been evaluated by a doctor (or NP/PA)    Answer Assessment - Initial Assessment Questions  1. APPEARANCE of RASH: \"Describe the rash.\"       Little bumps that are very itchy  2. LOCATION: \"Where is the rash located?\"       Arms   3. NUMBER: \"How many spots are there?\"       There are a lot  4. SIZE: \"How big are the spots?\" (Inches, centimeters or compare to size of a coin)       Little dots that are raised   5. ONSET: \"When did the rash start?\"       Yesterday   6. ITCHING: \"Does the rash itch?\" If Yes, ask: \"How bad is the itch?\"  " "(Scale 0-10; or none, mild, moderate, severe)      7/10  7. PAIN: \"Does the rash hurt?\" If Yes, ask: \"How bad is the pain?\"  (Scale 0-10; or none, mild, moderate, severe)     - NONE (0): no pain     - MILD (1-3): doesn't interfere with normal activities      - MODERATE (4-7): interferes with normal activities or awakens from sleep      - SEVERE (8-10): excruciating pain, unable to do any normal activities      no  8. OTHER SYMPTOMS: \"Do you have any other symptoms?\" (e.g., fever)      no  9. PREGNANCY: \"Is there any chance you are pregnant?\" \"When was your last menstrual period?\"      N/a    Protocols used: Rash or Redness - [id number]    "

## 2024-03-22 ENCOUNTER — ANCILLARY PROCEDURE (OUTPATIENT)
Dept: MAMMOGRAPHY | Facility: CLINIC | Age: 74
End: 2024-03-22
Attending: PHYSICIAN ASSISTANT
Payer: COMMERCIAL

## 2024-03-22 ENCOUNTER — OFFICE VISIT (OUTPATIENT)
Dept: FAMILY MEDICINE | Facility: CLINIC | Age: 74
End: 2024-03-22
Payer: COMMERCIAL

## 2024-03-22 VITALS
RESPIRATION RATE: 16 BRPM | HEIGHT: 63 IN | BODY MASS INDEX: 24.8 KG/M2 | TEMPERATURE: 98 F | SYSTOLIC BLOOD PRESSURE: 129 MMHG | WEIGHT: 140 LBS | HEART RATE: 70 BPM | DIASTOLIC BLOOD PRESSURE: 75 MMHG | OXYGEN SATURATION: 98 %

## 2024-03-22 DIAGNOSIS — G47.9 SLEEP DISTURBANCE: ICD-10-CM

## 2024-03-22 DIAGNOSIS — E78.5 HYPERLIPIDEMIA WITH TARGET LDL LESS THAN 130: ICD-10-CM

## 2024-03-22 DIAGNOSIS — I10 BENIGN ESSENTIAL HYPERTENSION: ICD-10-CM

## 2024-03-22 DIAGNOSIS — Z29.11 NEED FOR VACCINATION AGAINST RESPIRATORY SYNCYTIAL VIRUS: ICD-10-CM

## 2024-03-22 DIAGNOSIS — Z23 NEED FOR SHINGLES VACCINE: ICD-10-CM

## 2024-03-22 DIAGNOSIS — I47.10 SUPRAVENTRICULAR TACHYCARDIA (H): ICD-10-CM

## 2024-03-22 DIAGNOSIS — I47.29 NSVT (NONSUSTAINED VENTRICULAR TACHYCARDIA) (H): ICD-10-CM

## 2024-03-22 DIAGNOSIS — R29.818 SUSPECTED SLEEP APNEA: ICD-10-CM

## 2024-03-22 DIAGNOSIS — Z78.0 POST-MENOPAUSAL: ICD-10-CM

## 2024-03-22 DIAGNOSIS — L30.9 DERMATITIS: ICD-10-CM

## 2024-03-22 DIAGNOSIS — Z12.31 VISIT FOR SCREENING MAMMOGRAM: ICD-10-CM

## 2024-03-22 DIAGNOSIS — Z13.820 SCREENING FOR OSTEOPOROSIS: ICD-10-CM

## 2024-03-22 DIAGNOSIS — Z23 NEED FOR TDAP VACCINATION: ICD-10-CM

## 2024-03-22 DIAGNOSIS — Z00.00 ROUTINE GENERAL MEDICAL EXAMINATION AT A HEALTH CARE FACILITY: Primary | ICD-10-CM

## 2024-03-22 DIAGNOSIS — Z12.11 SCREENING FOR COLON CANCER: ICD-10-CM

## 2024-03-22 PROBLEM — U07.1 PNEUMONIA DUE TO 2019 NOVEL CORONAVIRUS: Status: RESOLVED | Noted: 2021-11-07 | Resolved: 2024-03-22

## 2024-03-22 PROBLEM — J12.82 PNEUMONIA DUE TO 2019 NOVEL CORONAVIRUS: Status: RESOLVED | Noted: 2021-11-07 | Resolved: 2024-03-22

## 2024-03-22 LAB
CREAT UR-MCNC: 91.9 MG/DL
MICROALBUMIN UR-MCNC: <12 MG/L
MICROALBUMIN/CREAT UR: NORMAL MG/G{CREAT}

## 2024-03-22 PROCEDURE — 80053 COMPREHEN METABOLIC PANEL: CPT | Performed by: PHYSICIAN ASSISTANT

## 2024-03-22 PROCEDURE — 99397 PER PM REEVAL EST PAT 65+ YR: CPT | Performed by: PHYSICIAN ASSISTANT

## 2024-03-22 PROCEDURE — 77063 BREAST TOMOSYNTHESIS BI: CPT | Mod: TC | Performed by: STUDENT IN AN ORGANIZED HEALTH CARE EDUCATION/TRAINING PROGRAM

## 2024-03-22 PROCEDURE — 82274 ASSAY TEST FOR BLOOD FECAL: CPT | Performed by: PHYSICIAN ASSISTANT

## 2024-03-22 PROCEDURE — 82043 UR ALBUMIN QUANTITATIVE: CPT | Performed by: PHYSICIAN ASSISTANT

## 2024-03-22 PROCEDURE — 80061 LIPID PANEL: CPT | Performed by: PHYSICIAN ASSISTANT

## 2024-03-22 PROCEDURE — 36415 COLL VENOUS BLD VENIPUNCTURE: CPT | Performed by: PHYSICIAN ASSISTANT

## 2024-03-22 PROCEDURE — 99214 OFFICE O/P EST MOD 30 MIN: CPT | Mod: 25 | Performed by: PHYSICIAN ASSISTANT

## 2024-03-22 PROCEDURE — 77067 SCR MAMMO BI INCL CAD: CPT | Mod: TC | Performed by: STUDENT IN AN ORGANIZED HEALTH CARE EDUCATION/TRAINING PROGRAM

## 2024-03-22 PROCEDURE — 82570 ASSAY OF URINE CREATININE: CPT | Performed by: PHYSICIAN ASSISTANT

## 2024-03-22 RX ORDER — DILTIAZEM HYDROCHLORIDE 240 MG/1
240 CAPSULE, COATED, EXTENDED RELEASE ORAL DAILY
Qty: 90 CAPSULE | Refills: 3 | Status: SHIPPED | OUTPATIENT
Start: 2024-03-22

## 2024-03-22 RX ORDER — RESPIRATORY SYNCYTIAL VIRUS VACCINE 120MCG/0.5
0.5 KIT INTRAMUSCULAR ONCE
Qty: 1 EACH | Refills: 0 | Status: CANCELLED | OUTPATIENT
Start: 2024-03-22 | End: 2024-03-22

## 2024-03-22 RX ORDER — TRIAMCINOLONE ACETONIDE 0.25 MG/G
OINTMENT TOPICAL
Qty: 80 G | Refills: 0 | Status: SHIPPED | OUTPATIENT
Start: 2024-03-22

## 2024-03-22 RX ORDER — IRBESARTAN 150 MG/1
150 TABLET ORAL DAILY
Qty: 90 TABLET | Refills: 3 | Status: CANCELLED | OUTPATIENT
Start: 2024-03-22

## 2024-03-22 SDOH — HEALTH STABILITY: PHYSICAL HEALTH: ON AVERAGE, HOW MANY DAYS PER WEEK DO YOU ENGAGE IN MODERATE TO STRENUOUS EXERCISE (LIKE A BRISK WALK)?: 3 DAYS

## 2024-03-22 ASSESSMENT — SOCIAL DETERMINANTS OF HEALTH (SDOH): HOW OFTEN DO YOU GET TOGETHER WITH FRIENDS OR RELATIVES?: MORE THAN THREE TIMES A WEEK

## 2024-03-22 ASSESSMENT — PAIN SCALES - GENERAL: PAINLEVEL: NO PAIN (0)

## 2024-03-22 NOTE — PROGRESS NOTES
"Preventive Care Visit  [facility] JEROME Bhakta PA-C, Family Medicine  Mar 22, 2024      Assessment & Plan     Routine general medical examination at a health care facility    Need for shingles vaccine  Need for Tdap vaccination  Need for vaccination against respiratory syncytial virus  She will consider getting at pharmacy.    Hyperlipidemia with target LDL less than 130  She is fasting today  - Lipid panel reflex to direct LDL Fasting; Future  - Lipid panel reflex to direct LDL Fasting    Supraventricular tachycardia  Diltiazem 240 mg daily  Controlled, rarely experiences palpitations  - diltiazem ER COATED BEADS (CARDIZEM CD/CARTIA XT) 240 MG 24 hr capsule; Take 1 capsule (240 mg) by mouth daily    NSVT (nonsustained ventricular tachycardia) (H)  Cardiology 3/2021  \"1.  Palpitation.  Ms. Lundberg's symptoms earlier this year were consistent with symptomatic ectopic beats.  Occasional PVCs and brief runs of SVT were documented on a cardiac monitor.  None of these arrhythmias is particularly concerning.  No additional treatment is needed, especially given that her symptoms have improved after caffeine restriction.   Because of her PVCs and run of NSVT (as well as strongly positive history for CAD), it is reasonable to pursue a stress test.\"  Stress echo 4/8/21 normal    Benign essential hypertension  Blood pressure appears well controlled off of medication. Will remain off irbesartan and continue to monitor BP. Follow-up if BP increasing >130/80 consistently.  - Comprehensive metabolic panel (BMP + Alb, Alk Phos, ALT, AST, Total. Bili, TP); Future  - Albumin Random Urine Quantitative with Creat Ratio; Future  - Comprehensive metabolic panel (BMP + Alb, Alk Phos, ALT, AST, Total. Bili, TP)  - Albumin Random Urine Quantitative with Creat Ratio    Suspected sleep apnea  Sleep disturbance  Referred to sleep clinic last year for suspected sleep apnea, daytime drowsiness. Has not scheduled " this and does not want to at this time.    Dermatitis  Suspect allergic contact dermatitis possibly related to sleeping on sheets in home where she was dog sitting. No signs of angioedema or anaphylaxis. Rash is no longer spreading. Can continue with claritin. Will add topical steroid. Close monitoring and follow-up if worsening or no improvement. Discussed signs/symptoms that would warrant urgent/emergent follow-up.  - triamcinolone (KENALOG) 0.025 % external ointment; Apply once or twice daily to affected areas on arms and face for one to two weeks. Then apply once every other day for the following 1-2 weeks    Screening for colon cancer  Discussed, she prefers FIT test  - Fecal colorectal cancer screen (FIT); Future  - Fecal colorectal cancer screen (FIT)    Post-menopausal  Screening for osteoporosis  - DEXA HIP/PELVIS/SPINE - Future; Future      Counseling  Appropriate preventive services were discussed with this patient.  Checklist reviewing preventive services available has been given to the patient.        Aileen Marvin is a 73 year old, presenting for the following:  Medicare Visit        3/22/2024     9:15 AM   Additional Questions   Roomed by jacque zaidi   Accompanied by self         3/22/2024     9:15 AM   Patient Reported Additional Medications   Patient reports taking the following new medications na        Health Care Directive  Patient does not have a Health Care Directive or Living Will: Discussed advance care planning with patient; however, patient declined at this time.    HPI    Rash on hands/forearms on Sunday  Spread to face  Not on any other part of body  Dog sitting last week  Was staying at their home and sleeping in their bed  Possibly reacting to their laundry detergent?  No other new exposures    Claritin for last 2 days, hydrocortisone and benadryl cream  Has been helping some  Rash is no longer spreading  No lip/mouth swelling, throat swelling, shortness of breath, trouble swallowing,  "chest pain, dizziness, lightheadedness, vomiting, headache.  No fever or recent illness    Referred to sleep clinic last year for suspected sleep apnea, daytime drowsiness. Has not scheduled this and does not want to at this time.    HTN  Irbesartan 150 mg daily  Was monitoring BP at home and it was low (<100/70) so she started taking 1/2 tab daily for past 6 months. BP was still running low so she stopped taking medication 6 weeks ago. BP has been around 110s/70s since stopping medication. She did take 1 tab this morning because she was worried blood pressure would be higher because of the rash but otherwise hasn't been taking it and would prefer to stay off of it.  No chest pain, shortness of breath, swelling in the extremities, palpitations, dizziness, headaches, blurred vision.    SVT  Diltiazem 240 mg daily  Controlled, rarely experiences palpitations    Cardiology 3/2021  \"1.  Palpitation.  Ms. Lundberg's symptoms earlier this year were consistent with symptomatic ectopic beats.  Occasional PVCs and brief runs of SVT were documented on a cardiac monitor.  None of these arrhythmias is particularly concerning.  No additional treatment is needed, especially given that her symptoms have improved after caffeine restriction.   Because of her PVCs and run of NSVT (as well as strongly positive history for CAD), it is reasonable to pursue a stress test.\"  Stress echo 4/8/21 normal        3/22/2024   General Health   How would you rate your overall physical health? Good   Feel stress (tense, anxious, or unable to sleep) Not at all         3/22/2024   Nutrition   Diet: Regular (no restrictions)         3/22/2024   Exercise   Days per week of moderate/strenous exercise 3 days         3/22/2024   Social Factors   Frequency of gathering with friends or relatives More than three times a week   Worry food won't last until get money to buy more No   Food not last or not have enough money for food? No   Do you have housing?  " Yes   Are you worried about losing your housing? No   Lack of transportation? No   Unable to get utilities (heat,electricity)? No         3/22/2024   Activities of Daily Living- Home Safety   Needs help with the following daily activites None of the above   Safety concerns in the home None of the above         3/22/2024   Dental   Dentist two times every year? Yes         3/22/2024   Hearing Screening   Hearing concerns? None of the above         3/22/2024   Driving Risk Screening   Patient/family members have concerns about driving No         3/22/2024   General Alertness/Fatigue Screening   Have you been more tired than usual lately? (!) YES         3/22/2024   Urinary Incontinence Screening   Bothered by leaking urine in past 6 months No         3/22/2024   TB Screening   Were you born outside of the [place]? No         Today's PHQ-2 Score:       3/22/2024     9:23 AM   PHQ-2 ( 1999 Pf[employer])   Q1: Little interest or pleasure in doing things 0   Q2: Feeling down, depressed or hopeless 0   PHQ-2 Score 0   Q1: Little interest or pleasure in doing things Not at all   Q2: Feeling down, depressed or hopeless Not at all   PHQ-2 Score 0           3/22/2024   Substance Use   Alcohol more than 3/day or more than 7/wk No   Do you have a current opioid prescription? No   How severe/bad is pain from 1 to 10? 1/10   Do you use any other substances recreationally? (!) BATH SALTS     Social History     Tobacco Use    Smoking status: Former     Passive exposure: Never    Smokeless tobacco: Never    Tobacco comments:     quit in 1985   Vaping Use    Vaping Use: Never used   Substance Use Topics    Alcohol use: No     Alcohol/week: 0.0 standard drinks of alcohol    Drug use: No           2/25/2022   LAST FHS-7 RESULTS   1st degree relative breast or ovarian cancer No   Any relative bilateral breast cancer No   Any male have breast cancer No   Any ONE woman have BOTH breast AND ovarian cancer No   Any woman with breast cancer before 50yrs  No   2 or more relatives with breast AND/OR ovarian cancer No   2 or more relatives with breast AND/OR bowel cancer No        Mammogram Screening - Mammogram every 1-2 years updated in Health Maintenance based on mutual decision making    ASCVD Risk   The 10-year ASCVD risk score (Farzaneh ESPARZA, et al., 2019) is: 17.6%    Values used to calculate the score:      Age: 73 years      Sex: Female      Is Non- : No      Diabetic: No      Tobacco smoker: No      Systolic Blood Pressure: 129 mmHg      Is BP treated: Yes      HDL Cholesterol: 46 mg/dL      Total Cholesterol: 199 mg/dL            Reviewed and updated as needed this visit by Provider   Tobacco  Allergies  Meds  Problems  Med Hx  Surg Hx  Fam Hx     Sexual Activity          Past Medical History:   Diagnosis Date    HTN (hypertension)     Hyperlipidemia     Infection due to 2019 novel coronavirus 11/2021    NOT vaccinated at time of diagnosis    Pneumonia due to 2019 novel coronavirus      Past Surgical History:   Procedure Laterality Date    C/SECTION, LOW TRANSVERSE      x2    HERNIA REPAIR       Lab work is in process  Labs reviewed in EPIC  BP Readings from Last 3 Encounters:   03/22/24 129/75   04/24/23 128/70   03/02/23 110/64    Wt Readings from Last 3 Encounters:   03/22/24 63.5 kg (140 lb)   04/24/23 65.3 kg (144 lb)   03/02/23 64.9 kg (143 lb)                  Patient Active Problem List   Diagnosis    Benign essential hypertension    Supraventricular tachycardia    Hyperlipidemia with target LDL less than 130    Blood glucose elevated    Sleep disturbance    NSVT (nonsustained ventricular tachycardia) (H)     Past Surgical History:   Procedure Laterality Date    C/SECTION, LOW TRANSVERSE      x2    HERNIA REPAIR         Social History     Tobacco Use    Smoking status: Former     Passive exposure: Never    Smokeless tobacco: Never    Tobacco comments:     quit in 1985   Substance Use Topics    Alcohol use: No      Alcohol/week: 0.0 standard drinks of alcohol     Family History   Problem Relation Age of Onset    Lung Cancer Father     Lung Cancer Brother     Arrhythmia Brother     Arrhythmia Brother     Coronary Artery Disease Brother         MI @55 and     Coronary Artery Disease Brother         MI @55 and  @76    Coronary Artery Disease Brother         MI @54 and          Current Outpatient Medications   Medication Sig Dispense Refill    Ascorbic Acid (VITAMIN C PO) Take 1 tablet by mouth daily       aspirin 81 MG EC tablet Take 81 mg by mouth daily      co-enzyme Q-10 100 MG CAPS capsule Take 100 mg by mouth daily      diltiazem ER COATED BEADS (CARDIZEM CD/CARTIA XT) 240 MG 24 hr capsule Take 1 capsule (240 mg) by mouth daily 90 capsule 3    Fish Oil-Cholecalciferol (FISH OIL + D3) 7439-7258 MG-UNIT CAPS Take 1 capsule by mouth daily       irbesartan (AVAPRO) 150 MG tablet Take 1 tablet (150 mg) by mouth daily 90 tablet 3    nitroGLYcerin (NITROSTAT) 0.4 MG sublingual tablet For chest pain place 1 tablet under the tongue every 5 minutes for 3 doses. If symptoms persist 5 minutes after 1st dose call 911. 30 tablet 1    Red Yeast Rice Extract 600 MG CAPS Take 600 mg by mouth daily      triamcinolone (KENALOG) 0.025 % external ointment Apply once or twice daily to affected areas on arms and face for one to two weeks. Then apply once every other day for the following 1-2 weeks 80 g 0    VITAMIN D PO Take 1 tablet by mouth 2 times daily        Allergies   Allergen Reactions    Sulfa Antibiotics Anaphylaxis     Recent Labs   Lab Test 23  1008 22  0811 21  0814 21  0544 21  1212 21  1406 20  1528 18  1455 17  0815   A1C  --  5.6  --   --   --   --   --  5.5 5.6   * 124*  --   --   --  117* 152* 160* 90   HDL 46* 46*  --   --   --  45* 50 39* 32*   TRIG 81 81  --   --   --  114 63 107 75   ALT 21 30  --  26   < > 28 21 20 32   CR 0.55 0.53   < > 0.46*   < >  "0.48* 0.45* 0.53 0.47*   GFRESTIMATED >90 >90   < > >90   < > >90 >90 >90 >90   GFRESTBLACK  --   --   --   --   --  >90 >90 >90 >90   POTASSIUM 4.3 4.2   < > 4.5   < > 4.0 4.0 4.2 4.2   TSH  --   --   --   --   --  0.99  --   --  1.85    < > = values in this interval not displayed.      Current providers sharing in care for this patient include:  Patient Care Team:  Clinic - MACHELLE Orantes [facility] as PCP - Clemente Bradshaw PA-C as Assigned PCP    The following health maintenance items are reviewed in Epic and correct as of today:  Health Maintenance   Topic Date Due    DEXA  Never done    ZOSTER IMMUNIZATION (1 of 2) Never done    RSV VACCINE (Pregnancy & 60+) (1 - 1-dose 60+ series) Never done    COLORECTAL CANCER SCREENING  04/13/2022    DTAP/TDAP/TD IMMUNIZATION (2 - Td or Tdap) 09/06/2022    INFLUENZA VACCINE (1) 09/01/2023    COVID-19 Vaccine (1 - 2023-24 season) Never done    LIPID  03/02/2024    MAMMO SCREENING  02/25/2024    MEDICARE ANNUAL WELLNESS VISIT  03/22/2025    ANNUAL REVIEW OF HM ORDERS  03/22/2025    FALL RISK ASSESSMENT  03/22/2025    GLUCOSE  03/02/2026    ADVANCE CARE PLANNING  03/02/2028    HEPATITIS C SCREENING  Completed    PHQ-2 (once per calendar year)  Completed    Pneumococcal Vaccine: 65+ Years  Completed    IPV IMMUNIZATION  Aged Out    HPV IMMUNIZATION  Aged Out    MENINGITIS IMMUNIZATION  Aged Out    RSV MONOCLONAL ANTIBODY  Aged Out    LUNG CANCER SCREENING  Discontinued         Review of Systems  Constitutional, HEENT, cardiovascular, pulmonary, gi and gu systems are negative, except as otherwise noted.     Objective    Exam  /75   Pulse 70   Temp 98  F (36.7  C) (Oral)   Resp 16   Ht 1.6 m (5' 3\")   Wt 63.5 kg (140 lb)   SpO2 98%   BMI 24.80 kg/m     Estimated body mass index is 24.8 kg/m  as calculated from the following:    Height as of this encounter: 1.6 m (5' 3\").    Weight as of this encounter: 63.5 kg (140 lb).    Physical Exam  GENERAL: " alert and no distress  EYES: Eyes grossly normal to inspection, PERRL and conjunctivae and sclerae normal  HENT: ear canals and TM's normal, nose and mouth without ulcers or lesions. No lip, tongue swelling. Normal uvula, midline.  NECK: no adenopathy, no asymmetry, masses, or scars  RESP: lungs clear to auscultation - no rales, rhonchi or wheezes  CV: regular rate and rhythm, normal S1 S2, no S3 or S4, no murmur, click or rub, no peripheral edema  ABDOMEN: soft, nontender, bowel sounds normal  MS: no gross musculoskeletal defects noted, no edema  SKIN: Erythematous plaques on lower face, anterior neck, postauricular regions. Erythematous scaly papules and plaques bilateral forearms and hands.  NEURO: Normal strength and tone, mentation intact and speech normal  PSYCH: mentation appears normal, affect normal/bright        3/22/2024   Mini Cog   Clock Draw Score 2 Normal   3 Item Recall 3 objects recalled   Mini Cog Total Score 5             Signed Electronically by: Paula Bhakta PA-C

## 2024-03-22 NOTE — PATIENT INSTRUCTIONS
Okay to stay off of the irbesartan and continue to monitor blood pressure. Follow-up if blood pressure is increasing >130/80 consistently.      Preventive Care Advice   This is general advice given by our system to help you stay healthy. However, your care team may have specific advice just for you. Please talk to your care team about your preventive care needs.  Nutrition  Eat 5 or more servings of fruits and vegetables each day.  Try wheat bread, brown rice and whole grain pasta (instead of white bread, rice, and pasta).  Get enough calcium and vitamin D. Check the label on foods and aim for 100% of the RDA (recommended daily allowance).  Lifestyle  Exercise at least 150 minutes each week   (30 minutes a day, 5 days a week).  Do muscle strengthening activities 2 days a week. These help control your weight and prevent disease.  No smoking.  Wear sunscreen to prevent skin cancer.  Have a dental exam and cleaning every 6 months.  Yearly exams  See your health care team every year to talk about:  Any changes in your health.  Any medicines your care team has prescribed.  Preventive care, family planning, and ways to prevent chronic diseases.  Shots (vaccines)   HPV shots (up to age 26), if you've never had them before.  Hepatitis B shots (up to age 59), if you've never had them before.  COVID-19 shot: Get this shot when it's due.  Flu shot: Get a flu shot every year.  Tetanus shot: Get a tetanus shot every 10 years.  Pneumococcal, hepatitis A, and RSV shots: Ask your care team if you need these based on your risk.  Shingles shot (for age 50 and up).  General health tests  Diabetes screening:  Starting at age 35, Get screened for diabetes at least every 3 years.  If you are younger than age 35, ask your care team if you should be screened for diabetes.  Cholesterol test: At age 39, start having a cholesterol test every 5 years, or more often if advised.  Bone density scan (DEXA): At age 50, ask your care team if you  should have this scan for osteoporosis (brittle bones).  Hepatitis C: Get tested at least once in your life.  STIs (sexually transmitted infections)  Before age 24: Ask your care team if you should be screened for STIs.  After age 24: Get screened for STIs if you're at risk. You are at risk for STIs (including HIV) if:  You are sexually active with more than one person.  You don't use condoms every time.  You or a partner was diagnosed with a sexually transmitted infection.  If you are at risk for HIV, ask about PrEP medicine to prevent HIV.  Get tested for HIV at least once in your life, whether you are at risk for HIV or not.  Cancer screening tests  Cervical cancer screening: If you have a cervix, begin getting regular cervical cancer screening tests at age 21. Most people who have regular screenings with normal results can stop after age 65. Talk about this with your provider.  Breast cancer scan (mammogram): If you've ever had breasts, begin having regular mammograms starting at age 40. This is a scan to check for breast cancer.  Colon cancer screening: It is important to start screening for colon cancer at age 45.  Have a colonoscopy test every 10 years (or more often if you're at risk) Or, ask your provider about stool tests like a FIT test every year or Cologuard test every 3 years.  To learn more about your testing options, visit: https://www.HelpAround/951005.pdf.  For help making a decision, visit: https://bit.ly/fa30052.  Prostate cancer screening test: If you have a prostate and are age 55 to 69, ask your provider if you would benefit from a yearly prostate cancer screening test.  Lung cancer screening: If you are a current or former smoker age 50 to 80, ask your care team if ongoing lung cancer screenings are right for you.  For informational purposes only. Not to replace the advice of your health care provider. Copyright   2023 [employer]. All rights reserved. Clinically reviewed by the   [employer]. [employer] 480394 - REV 01/24.    Learning About Sleeping Well  What does sleeping well mean?     Sleeping well means getting enough sleep to feel good and stay healthy. How much sleep is enough varies among people.  The number of hours you sleep and how you feel when you wake up are both important. If you do not feel refreshed, you probably need more sleep. Another sign of not getting enough sleep is feeling tired during the day.  Experts recommend that adults get at least 7 or more hours of sleep per day. Children and older adults need more sleep.  Why is getting enough sleep important?  Getting enough quality sleep is a basic part of good health. When your sleep suffers, your physical health, mood, and your thoughts can suffer too. You may find yourself feeling more grumpy or stressed. Not getting enough sleep also can lead to serious problems, including injury, accidents, anxiety, and depression.  What might cause poor sleeping?  Many things can cause sleep problems, including:  Changes to your sleep schedule.  Stress. Stress can be caused by fear about a single event, such as giving a speech. Or you may have ongoing stress, such as worry about work or school.  Depression, anxiety, and other mental or emotional conditions.  Changes in your sleep habits or surroundings. This includes changes that happen where you sleep, such as noise, light, or sleeping in a different bed. It also includes changes in your sleep pattern, such as having jet lag or working a late shift.  Health problems, such as pain, breathing problems, and restless legs syndrome.  Lack of regular exercise.  Using alcohol, nicotine, or caffeine before bed.  How can you help yourself?  Here are some tips that may help you sleep more soundly and wake up feeling more refreshed.  Your sleeping area   Use your bedroom only for sleeping and sex. A bit of light reading may help you fall asleep. But if it doesn't, do  "your reading elsewhere in the house. Try not to use your TV, computer, smartphone, or tablet while you are in bed.  Be sure your bed is big enough to stretch out comfortably, especially if you have a sleep partner.  Keep your bedroom quiet, dark, and cool. Use curtains, blinds, or a sleep mask to block out light. To block out noise, use earplugs, soothing music, or a \"white noise\" machine.  Your evening and bedtime routine   Create a relaxing bedtime routine. You might want to take a warm shower or bath, or listen to soothing music.  Go to bed at the same time every night. And get up at the same time every morning, even if you feel tired.  What to avoid   Limit caffeine (coffee, tea, caffeinated sodas) during the day, and don't have any for at least 6 hours before bedtime.  Avoid drinking alcohol before bedtime. Alcohol can cause you to wake up more often during the night.  Try not to smoke or use tobacco, especially in the evening. Nicotine can keep you awake.  Limit naps during the day, especially close to bedtime.  Avoid lying in bed awake for too long. If you can't fall asleep or if you wake up in the middle of the night and can't get back to sleep within about 20 minutes, get out of bed and go to another room until you feel sleepy.  Avoid taking medicine right before bed that may keep you awake or make you feel hyper or energized. Your doctor can tell you if your medicine may do this and if you can take it earlier in the day.  If you can't sleep   Imagine yourself in a peaceful, pleasant scene. Focus on the details and feelings of being in a place that is relaxing.  Get up and do a quiet or boring activity until you feel sleepy.  Avoid drinking any liquids before going to bed to help prevent waking up often to use the bathroom.  Where can you learn more?  Go to https://www.healthwise.net/patiented  Enter J942 in the search box to learn more about \"Learning About Sleeping Well.\"  Current as of: July 10, 2023     "           Content Version: 14.0    5501-3302 [employer].   Care instructions adapted under license by your healthcare professional. If you have questions about a medical condition or this instruction, always ask your healthcare professional. [employer] disclaims any warranty or liability for your use of this information.      Substance Use Disorder: Care Instructions  Overview     You can improve your life and health by stopping your use of alcohol or drugs. When you don't drink or use drugs, you may feel and sleep better. You may get along better with your family, friends, and coworkers. There are medicines and programs that can help with substance use disorder.  How can you care for yourself at home?  Here are some ways to help you stay sober and prevent relapse.  If you have been given medicine to help keep you sober or reduce your cravings, be sure to take it exactly as prescribed.  Talk to your doctor about programs that can help you stop using drugs or drinking alcohol.  Do not keep alcohol or drugs in your home.  Plan ahead. Think about what you'll say if other people ask you to drink or use drugs. Try not to spend time with people who drink or use drugs.  Use the time and money spent on drinking or drugs to do something that's important to you.  Preventing a relapse  Have a plan to deal with relapse. Learn to recognize changes in your thinking that lead you to drink or use drugs. Get help before you start to drink or use drugs again.  Try to stay away from situations, friends, or places that may lead you to drink or use drugs.  If you feel the need to drink alcohol or use drugs again, seek help right away. Call a trusted friend or family member. Some people get support from organizations such as Narcotics Anonymous or [employer] or from treatment facilities.  If you relapse, get help as soon as you can. Some people make a plan with another person that outlines what they want  that person to do for them if they relapse. The plan usually includes how to handle the relapse and who to notify in case of relapse.  Don't give up. Remember that a relapse doesn't mean that you have failed. Use the experience to learn the triggers that lead you to drink or use drugs. Then quit again. Recovery is a lifelong process. Many people have several relapses before they are able to quit for good.  Follow-up care is a key part of your treatment and safety. Be sure to make and go to all appointments, and call your doctor if you are having problems. It's also a good idea to know your test results and keep a list of the medicines you take.  When should you call for help?   Call 911  anytime you think you may need emergency care. For example, call if you or someone else:    Has overdosed or has withdrawal signs. Be sure to tell the emergency workers that you are or someone else is using or trying to quit using drugs. Overdose or withdrawal signs may include:  Losing consciousness.  Seizure.  Seeing or hearing things that aren't there (hallucinations).     Is thinking or talking about suicide or harming others.   Where to get help 24 hours a day, 7 days a week   If you or someone you know talks about suicide, self-harm, a mental health crisis, a substance use crisis, or any other kind of emotional distress, get help right away. You can:    Call the Suicide and Crisis Lifeline at 983.     Call 7-404-855-LRZD (1-169.511.8397).     Text HOME to 631153 to access the Crisis Text Line.   Consider saving these numbers in your phone.  Go to [facility] for more information or to chat online.  Call your doctor now or seek immediate medical care if:    You are having withdrawal symptoms. These may include nausea or vomiting, sweating, shakiness, and anxiety.   Watch closely for changes in your health, and be sure to contact your doctor if:    You have a relapse.     You need more help or support to stop.   Where can  "you learn more?  Go to https://www.[employer].net/patiented  Enter H573 in the search box to learn more about \"Substance Use Disorder: Care Instructions.\"  Current as of: November 15, 2023               Content Version: 14.0    4925-2919 [employer].   Care instructions adapted under license by your healthcare professional. If you have questions about a medical condition or this instruction, always ask your healthcare professional. [employer] disclaims any warranty or liability for your use of this information.      "

## 2024-03-23 LAB
ALBUMIN SERPL BCG-MCNC: 4.1 G/DL (ref 3.5–5.2)
ALP SERPL-CCNC: 80 U/L (ref 40–150)
ALT SERPL W P-5'-P-CCNC: 19 U/L (ref 0–50)
ANION GAP SERPL CALCULATED.3IONS-SCNC: 10 MMOL/L (ref 7–15)
AST SERPL W P-5'-P-CCNC: 18 U/L (ref 0–45)
BILIRUB SERPL-MCNC: 0.5 MG/DL
BUN SERPL-MCNC: 11 MG/DL (ref 8–23)
CALCIUM SERPL-MCNC: 9.3 MG/DL (ref 8.8–10.2)
CHLORIDE SERPL-SCNC: 103 MMOL/L (ref 98–107)
CHOLEST SERPL-MCNC: 196 MG/DL
CREAT SERPL-MCNC: 0.47 MG/DL (ref 0.51–0.95)
DEPRECATED HCO3 PLAS-SCNC: 27 MMOL/L (ref 22–29)
EGFRCR SERPLBLD CKD-EPI 2021: >90 ML/MIN/1.73M2
FASTING STATUS PATIENT QL REPORTED: YES
GLUCOSE SERPL-MCNC: 92 MG/DL (ref 70–99)
HDLC SERPL-MCNC: 45 MG/DL
LDLC SERPL CALC-MCNC: 135 MG/DL
NONHDLC SERPL-MCNC: 151 MG/DL
POTASSIUM SERPL-SCNC: 4 MMOL/L (ref 3.4–5.3)
PROT SERPL-MCNC: 6.5 G/DL (ref 6.4–8.3)
SODIUM SERPL-SCNC: 140 MMOL/L (ref 135–145)
TRIGL SERPL-MCNC: 81 MG/DL

## 2024-03-29 LAB — HEMOCCULT STL QL IA: NEGATIVE

## 2025-03-24 ENCOUNTER — OFFICE VISIT (OUTPATIENT)
Dept: FAMILY MEDICINE | Facility: CLINIC | Age: 75
End: 2025-03-24
Attending: PHYSICIAN ASSISTANT
Payer: COMMERCIAL

## 2025-03-24 VITALS
SYSTOLIC BLOOD PRESSURE: 128 MMHG | TEMPERATURE: 98.7 F | HEIGHT: 63 IN | RESPIRATION RATE: 15 BRPM | BODY MASS INDEX: 26.05 KG/M2 | DIASTOLIC BLOOD PRESSURE: 78 MMHG | WEIGHT: 147 LBS | HEART RATE: 71 BPM | OXYGEN SATURATION: 99 %

## 2025-03-24 DIAGNOSIS — I47.29 NSVT (NONSUSTAINED VENTRICULAR TACHYCARDIA) (H): ICD-10-CM

## 2025-03-24 DIAGNOSIS — I10 BENIGN ESSENTIAL HYPERTENSION: ICD-10-CM

## 2025-03-24 DIAGNOSIS — R29.818 SUSPECTED SLEEP APNEA: ICD-10-CM

## 2025-03-24 DIAGNOSIS — Z78.0 ASYMPTOMATIC POSTMENOPAUSAL STATUS: ICD-10-CM

## 2025-03-24 DIAGNOSIS — I47.10 SUPRAVENTRICULAR TACHYCARDIA: ICD-10-CM

## 2025-03-24 DIAGNOSIS — E78.5 HYPERLIPIDEMIA WITH TARGET LDL LESS THAN 130: ICD-10-CM

## 2025-03-24 DIAGNOSIS — R00.1 BRADYCARDIA: ICD-10-CM

## 2025-03-24 DIAGNOSIS — Z12.11 SCREEN FOR COLON CANCER: ICD-10-CM

## 2025-03-24 DIAGNOSIS — E66.3 OVERWEIGHT (BMI 25.0-29.9): ICD-10-CM

## 2025-03-24 DIAGNOSIS — Z00.00 ENCOUNTER FOR MEDICARE ANNUAL WELLNESS EXAM: Primary | ICD-10-CM

## 2025-03-24 LAB
ERYTHROCYTE [DISTWIDTH] IN BLOOD BY AUTOMATED COUNT: 12.6 % (ref 10–15)
HCT VFR BLD AUTO: 41.2 % (ref 35–47)
HGB BLD-MCNC: 13.5 G/DL (ref 11.7–15.7)
MCH RBC QN AUTO: 30.5 PG (ref 26.5–33)
MCHC RBC AUTO-ENTMCNC: 32.8 G/DL (ref 31.5–36.5)
MCV RBC AUTO: 93 FL (ref 78–100)
PLATELET # BLD AUTO: 320 10E3/UL (ref 150–450)
RBC # BLD AUTO: 4.42 10E6/UL (ref 3.8–5.2)
WBC # BLD AUTO: 5.2 10E3/UL (ref 4–11)

## 2025-03-24 PROCEDURE — 84443 ASSAY THYROID STIM HORMONE: CPT | Performed by: PHYSICIAN ASSISTANT

## 2025-03-24 PROCEDURE — 85027 COMPLETE CBC AUTOMATED: CPT | Performed by: PHYSICIAN ASSISTANT

## 2025-03-24 PROCEDURE — G0439 PPPS, SUBSEQ VISIT: HCPCS | Performed by: PHYSICIAN ASSISTANT

## 2025-03-24 PROCEDURE — 3074F SYST BP LT 130 MM HG: CPT | Performed by: PHYSICIAN ASSISTANT

## 2025-03-24 PROCEDURE — 36415 COLL VENOUS BLD VENIPUNCTURE: CPT | Performed by: PHYSICIAN ASSISTANT

## 2025-03-24 PROCEDURE — 93000 ELECTROCARDIOGRAM COMPLETE: CPT | Performed by: PHYSICIAN ASSISTANT

## 2025-03-24 PROCEDURE — G2211 COMPLEX E/M VISIT ADD ON: HCPCS | Performed by: PHYSICIAN ASSISTANT

## 2025-03-24 PROCEDURE — 82043 UR ALBUMIN QUANTITATIVE: CPT | Performed by: PHYSICIAN ASSISTANT

## 2025-03-24 PROCEDURE — 80061 LIPID PANEL: CPT | Performed by: PHYSICIAN ASSISTANT

## 2025-03-24 PROCEDURE — 1126F AMNT PAIN NOTED NONE PRSNT: CPT | Performed by: PHYSICIAN ASSISTANT

## 2025-03-24 PROCEDURE — 99214 OFFICE O/P EST MOD 30 MIN: CPT | Mod: 25 | Performed by: PHYSICIAN ASSISTANT

## 2025-03-24 PROCEDURE — 80048 BASIC METABOLIC PNL TOTAL CA: CPT | Performed by: PHYSICIAN ASSISTANT

## 2025-03-24 PROCEDURE — 82570 ASSAY OF URINE CREATININE: CPT | Performed by: PHYSICIAN ASSISTANT

## 2025-03-24 PROCEDURE — 3078F DIAST BP <80 MM HG: CPT | Performed by: PHYSICIAN ASSISTANT

## 2025-03-24 RX ORDER — IRBESARTAN 75 MG/1
75 TABLET ORAL AT BEDTIME
Qty: 90 TABLET | Refills: 3 | Status: SHIPPED | OUTPATIENT
Start: 2025-03-24

## 2025-03-24 RX ORDER — DILTIAZEM HYDROCHLORIDE 240 MG/1
240 CAPSULE, COATED, EXTENDED RELEASE ORAL DAILY
Qty: 90 CAPSULE | Refills: 3 | Status: SHIPPED | OUTPATIENT
Start: 2025-03-24

## 2025-03-24 SDOH — HEALTH STABILITY: PHYSICAL HEALTH: ON AVERAGE, HOW MANY DAYS PER WEEK DO YOU ENGAGE IN MODERATE TO STRENUOUS EXERCISE (LIKE A BRISK WALK)?: 0 DAYS

## 2025-03-24 SDOH — HEALTH STABILITY: PHYSICAL HEALTH: ON AVERAGE, HOW MANY MINUTES DO YOU ENGAGE IN EXERCISE AT THIS LEVEL?: 0 MIN

## 2025-03-24 ASSESSMENT — SOCIAL DETERMINANTS OF HEALTH (SDOH): HOW OFTEN DO YOU GET TOGETHER WITH FRIENDS OR RELATIVES?: MORE THAN THREE TIMES A WEEK

## 2025-03-24 ASSESSMENT — PAIN SCALES - GENERAL: PAINLEVEL_OUTOF10: NO PAIN (0)

## 2025-03-24 NOTE — PROGRESS NOTES
Preventive Care Visit  [facility] LUCYMOJERRY Bhakta PA-C, Family Medicine  Mar 24, 2025      Assessment & Plan     Encounter for Medicare annual wellness exam  She noted some hearing concerns on questionnaire but does not feel these are bothersome and is not interested in referral to audiology at this time.    Screen for colon cancer  - Fecal colorectal cancer screen (FIT); Future  - Fecal colorectal cancer screen (FIT)    Benign essential hypertension  BP controlled. She has self adjusted her BP medication based on home BP. She was taking irbesartan 150 mg daily until last year when she stopped medication because BP was better. Home BP was better for a while but then started increasing a little so she restarted irbesartan that she had leftover but has been taking 1/2 tab (75 mg) daily. Home BP has been <130/80. She does report a few low blood pressures - lowest was 97/57 (measured with wrist cuff). When this has happened, she also notes heart rate is typically in the upper 40s. She has not verified with checking her heart rate manually. She tends to feel tired/relaxed when this happens. Starting eval for bradycardia as below. Can continue irbesartan 75 mg daily for now. Encouraged her not to self adjust medications but to reach out for evaluation/direction if noticing changes in BP.  - Basic metabolic panel  (Ca, Cl, CO2, Creat, Gluc, K, Na, BUN); Future  - Albumin Random Urine Quantitative with Creat Ratio; Future  - irbesartan (AVAPRO) 75 MG tablet; Take 1 tablet (75 mg) by mouth at bedtime.  - Basic metabolic panel  (Ca, Cl, CO2, Creat, Gluc, K, Na, BUN)  - Albumin Random Urine Quantitative with Creat Ratio    Hyperlipidemia with target LDL less than 130  She is fasting today   - Lipid panel reflex to direct LDL Fasting; Future  - Lipid panel reflex to direct LDL Fasting    Asymptomatic postmenopausal status  - DEXA HIP/PELVIS/SPINE - Future; Future    Bradycardia  Home BP has been  "<130/80. She does report a few low blood pressures - lowest was 97/57 (measured with wrist cuff). When this has happened, she also notes heart rate is typically in the upper 40s. She has not verified with checking her heart rate manually. She tends to feel tired/relaxed when this happens. She denies chest pain, shortness of breath, swelling in the extremities, dizziness, lightheadedness, headaches, blurred vision. EKG today shows sinus rhythm with PVCs. She has strong family history of arrhythmias. Discussed further eval with zio patch but she would prefer to see cardiology first and discuss recommendations/further evaluation with them; referral placed. Discussed red flag symptoms and return/ED precautions. Patient in agreement with plan, questions answered.   - EKG 12-lead complete w/read - Clinics  - TSH with free T4 reflex; Future  - CBC with platelets; Future  - Adult Cardiology Eval  Referral; Future  - TSH with free T4 reflex  - CBC with platelets    Supraventricular tachycardia  NSVT (nonsustained ventricular tachycardia) (H)  Cardiology 3/2021  \"1.  Palpitation.  Ms. Lundberg's symptoms earlier this year were consistent with symptomatic ectopic beats.  Occasional PVCs and brief runs of SVT were documented on a cardiac monitor.  None of these arrhythmias is particularly concerning.  No additional treatment is needed, especially given that her symptoms have improved after caffeine restriction.   Because of her PVCs and run of NSVT (as well as strongly positive history for CAD), it is reasonable to pursue a stress test.\"  Stress echo 4/8/21 normal  Follow-up with cardiology as above  - diltiazem ER COATED BEADS (CARDIZEM CD/CARTIA XT) 240 MG 24 hr capsule; Take 1 capsule (240 mg) by mouth daily.  - Adult Cardiology Eval  Referral; Future    Suspected sleep apnea  Referred to sleep clinic in the past for suspected sleep apnea, daytime drowsiness but did not follow-up. She would like to see " "them now.   - Adult Sleep Eval & Management  Referral; Future    Overweight (BMI 25.0-29.9)  BMI  Estimated body mass index is 26.04 kg/m  as calculated from the following:    Height as of this encounter: 1.6 m (5' 3\").    Weight as of this encounter: 66.7 kg (147 lb).   Weight management plan: Discussed healthy diet and exercise guidelines    Counseling  Appropriate preventive services were discussed with this patient.  Checklist reviewing preventive services available has been given to the patient.    The longitudinal plan of care for the diagnosis(es)/condition(s) as documented were addressed during this visit. Due to the added complexity in care, I will continue to support Shavonne in the subsequent management and with ongoing continuity of care.      Aileen Marvin is a 74 year old, presenting for the following:  Medicare Visit        3/24/2025    10:52 AM   Additional Questions   Roomed by MR   Accompanied by DAGMAR         3/24/2025    10:52 AM   Patient Reported Additional Medications   Patient reports taking the following new medications NA     HPI         DECLINES VACCINES    Sees dermatology for routine skin checks, had visit 11/22/24    Referred to sleep clinic last year for suspected sleep apnea, daytime drowsiness. Did not follow-up; would like another referral.     HTN  Irbesartan 150 mg daily - stopped in 1/2024  Started again a few months ago because BP was higher (she had leftover medication from previous rx). She has been taking 75 mg daily. Home BP <130/80. Has had a few low readings (97/57). She uses wrist cuff. Heart rate sometimes in upper 40s when BP is lower.    No chest pain, shortness of breath, swelling in the extremities, dizziness, lightheadedness, headaches, blurred vision.     SVT  Diltiazem  mg daily (has been on medication since 2003)    Cardiology 3/2021  \"1.  Palpitation.  Ms. Lundberg's symptoms earlier this year were consistent with symptomatic ectopic beats.  " "Occasional PVCs and brief runs of SVT were documented on a cardiac monitor.  None of these arrhythmias is particularly concerning.  No additional treatment is needed, especially given that her symptoms have improved after caffeine restriction.   Because of her PVCs and run of NSVT (as well as strongly positive history for CAD), it is reasonable to pursue a stress test.\"  Stress echo 4/8/21 normal      Advance Care Planning  Patient does not have a Health Care Directive: Discussed advance care planning with patient; however, patient declined at this time.      3/24/2025   General Health   How would you rate your overall physical health? Good   Feel stress (tense, anxious, or unable to sleep) Not at all         3/24/2025   Nutrition   Diet: Regular (no restrictions)    Low fat/cholesterol       Multiple values from one day are sorted in reverse-chronological order         3/24/2025   Exercise   Days per week of moderate/strenous exercise 0 days   Average minutes spent exercising at this level 0 min   (!) EXERCISE CONCERN      3/24/2025   Social Factors   Frequency of gathering with friends or relatives More than three times a week   Worry food won't last until get money to buy more No   Food not last or not have enough money for food? No   Do you have housing? (Housing is defined as stable permanent housing and does not include staying ouside in a car, in a tent, in an abandoned building, in an overnight shelter, or couch-surfing.) Yes   Are you worried about losing your housing? No   Lack of transportation? No   Unable to get utilities (heat,electricity)? No         3/24/2025   Fall Risk   Fallen 2 or more times in the past year? No    Trouble with walking or balance? No        Proxy-reported          3/24/2025   Activities of Daily Living- Home Safety   Needs help with the following daily activites None of the above   Safety concerns in the home None of the above         3/24/2025   Dental   Dentist two times every " year? Yes         3/24/2025   Hearing Screening   Hearing concerns? (!) I FEEL THAT PEOPLE ARE MUMBLING OR NOT SPEAKING CLEARLY.    (!) I NEED TO ASK PEOPLE TO SPEAK UP OR REPEAT THEMSELVES.    (!) IT'S HARDER TO UNDERSTAND WOMEN'S VOICES THAN MEN'S VOICES.    (!) IT'S HARD TO FOLLOW A CONVERSATION IN A NOISY RESTAURANT OR CROWDED ROOM.    (!) TROUBLE UNDESTANDING A SPEAKER IN A PUBLIC MEETING OR [employer] SERVICE.    (!) TROUBLE UNDERSTANDING SOFT OR WHISPERED SPEECH.       Multiple values from one day are sorted in reverse-chronological order         3/24/2025   Driving Risk Screening   Patient/family members have concerns about driving No         3/24/2025   General Alertness/Fatigue Screening   Have you been more tired than usual lately? (!) YES         3/24/2025   Urinary Incontinence Screening   Bothered by leaking urine in past 6 months No           3/22/2024   TB Screening   Were you born outside of the [place]? No           Today's PHQ-2 Score:       3/24/2025    10:48 AM   PHQ-2 ( 1999 Pf[employer])   Q1: Little interest or pleasure in doing things 0    Q2: Feeling down, depressed or hopeless 0    PHQ-2 Score 0    Q1: Little interest or pleasure in doing things Not at all   Q2: Feeling down, depressed or hopeless Not at all   PHQ-2 Score 0       Proxy-reported           3/24/2025   Substance Use   Alcohol more than 3/day or more than 7/wk No   Do you have a current opioid prescription? No   How severe/bad is pain from 1 to 10? 0/10 (No Pain)   Do you use any other substances recreationally? No     Social History     Tobacco Use    Smoking status: Former     Passive exposure: Never    Smokeless tobacco: Never    Tobacco comments:     quit in 1985   Vaping Use    Vaping status: Never Used   Substance Use Topics    Alcohol use: No     Alcohol/week: 0.0 standard drinks of alcohol    Drug use: No           3/22/2024   LAST FHS-7 RESULTS   1st degree relative breast or ovarian cancer No   Any relative bilateral breast  cancer No   Any male have breast cancer No   Any ONE woman have BOTH breast AND ovarian cancer No   Any woman with breast cancer before 50yrs No   2 or more relatives with breast AND/OR ovarian cancer No   2 or more relatives with breast AND/OR bowel cancer No        Mammogram Screening - Mammogram every 1-2 years updated in Health Maintenance based on mutual decision making    ASCVD Risk   The 10-year ASCVD risk score (Farzaneh ESPARZA, et al., 2019) is: 19.1%    Values used to calculate the score:      Age: 74 years      Sex: Female      Is Non- : No      Diabetic: No      Tobacco smoker: No      Systolic Blood Pressure: 128 mmHg      Is BP treated: Yes      HDL Cholesterol: 45 mg/dL      Total Cholesterol: 196 mg/dL            Reviewed and updated as needed this visit by Provider   Tobacco  Allergies  Meds  Problems  Med Hx  Surg Hx  Fam Hx            Past Medical History:   Diagnosis Date    HTN (hypertension)     Hyperlipidemia     Infection due to 2019 novel coronavirus 11/2021    NOT vaccinated at time of diagnosis    Pneumonia due to 2019 novel coronavirus      Past Surgical History:   Procedure Laterality Date    C/SECTION, LOW TRANSVERSE      x2    HERNIA REPAIR       Lab work is in process  Labs reviewed in EPIC  BP Readings from Last 3 Encounters:   03/24/25 128/78   03/22/24 129/75   04/24/23 128/70    Wt Readings from Last 3 Encounters:   03/24/25 66.7 kg (147 lb)   03/22/24 63.5 kg (140 lb)   04/24/23 65.3 kg (144 lb)                  Patient Active Problem List   Diagnosis    Benign essential hypertension    Supraventricular tachycardia    Hyperlipidemia with target LDL less than 130    Blood glucose elevated    Sleep disturbance    NSVT (nonsustained ventricular tachycardia) (H)     Past Surgical History:   Procedure Laterality Date    C/SECTION, LOW TRANSVERSE      x2    HERNIA REPAIR         Social History     Tobacco Use    Smoking status: Former     Passive  exposure: Never    Smokeless tobacco: Never    Tobacco comments:     quit in    Substance Use Topics    Alcohol use: No     Alcohol/week: 0.0 standard drinks of alcohol     Family History   Problem Relation Age of Onset    Lung Cancer Father     Lung Cancer Brother     Arrhythmia Brother     Arrhythmia Brother     Coronary Artery Disease Brother         MI @55 and     Coronary Artery Disease Brother         MI @55 and  @76    Coronary Artery Disease Brother         MI @54 and          Current Outpatient Medications   Medication Sig Dispense Refill    Ascorbic Acid (VITAMIN C PO) Take 1 tablet by mouth daily       aspirin 81 MG EC tablet Take 81 mg by mouth daily      co-enzyme Q-10 100 MG CAPS capsule Take 100 mg by mouth daily      diltiazem ER COATED BEADS (CARDIZEM CD/CARTIA XT) 240 MG 24 hr capsule Take 1 capsule (240 mg) by mouth daily. 90 capsule 3    Fish Oil-Cholecalciferol (FISH OIL + D3) 2098-0977 MG-UNIT CAPS Take 1 capsule by mouth daily       irbesartan (AVAPRO) 75 MG tablet Take 1 tablet (75 mg) by mouth at bedtime. 90 tablet 3    nitroGLYcerin (NITROSTAT) 0.4 MG sublingual tablet For chest pain place 1 tablet under the tongue every 5 minutes for 3 doses. If symptoms persist 5 minutes after 1st dose call 911. 30 tablet 1    Red Yeast Rice Extract 600 MG CAPS Take 600 mg by mouth daily      triamcinolone (KENALOG) 0.025 % external ointment Apply once or twice daily to affected areas on arms and face for one to two weeks. Then apply once every other day for the following 1-2 weeks 80 g 0    VITAMIN D PO Take 1 tablet by mouth 2 times daily        Allergies   Allergen Reactions    Sulfa Antibiotics Anaphylaxis     Recent Labs   Lab Test 24  1011 23  1008 22  0811 21  1212 21  1406 20  1528 18  1455 17  0815   A1C  --   --  5.6  --   --   --  5.5 5.6   * 137* 124*  --  117* 152* 160* 90   HDL 45* 46* 46*  --  45* 50 39* 32*   TRIG 81 81  "81  --  114 63 107 75   ALT 19 21 30   < > 28 21 20 32   CR 0.47* 0.55 0.53   < > 0.48* 0.45* 0.53 0.47*   GFRESTIMATED >90 >90 >90   < > >90 >90 >90 >90   GFRESTBLACK  --   --   --   --  >90 >90 >90 >90   POTASSIUM 4.0 4.3 4.2   < > 4.0 4.0 4.2 4.2   TSH  --   --   --   --  0.99  --   --  1.85    < > = values in this interval not displayed.      Current providers sharing in care for this patient include:  Patient Care Team:  Clinic - [facility] as PCP - Paula Smith PA-C as Assigned PCP    The following health maintenance items are reviewed in Epic and correct as of today:  Health Maintenance   Topic Date Due    DEXA  Never done    ZOSTER IMMUNIZATION (1 of 2) Never done    RSV VACCINE (1 - Risk 60-74 years 1-dose series) Never done    COLORECTAL CANCER SCREENING  03/23/2024    INFLUENZA VACCINE (1) 09/01/2024    COVID-19 Vaccine (1 - 2024-25 season) Never done    BMP  03/22/2025    LIPID  03/22/2025    ANNUAL REVIEW OF HM ORDERS  03/22/2025    MAMMO SCREENING  03/22/2026    MEDICARE ANNUAL WELLNESS VISIT  03/24/2026    FALL RISK ASSESSMENT  03/24/2026    DIABETES SCREENING  03/22/2027    ADVANCE CARE PLANNING  03/24/2030    DTAP/TDAP/TD IMMUNIZATION (4 - Td or Tdap) 01/27/2035    HEPATITIS C SCREENING  Completed    PHQ-2 (once per calendar year)  Completed    Pneumococcal Vaccine: 50+ Years  Completed    HPV IMMUNIZATION  Aged Out    MENINGITIS IMMUNIZATION  Aged Out    LUNG CANCER SCREENING  Discontinued         Review of Systems  Constitutional, neuro, ENT, endocrine, pulmonary, cardiac, gastrointestinal, genitourinary, musculoskeletal, integument and psychiatric systems are negative, except as otherwise noted.     Objective    Exam  /78 (BP Location: Right arm, Patient Position: Sitting, Cuff Size: Adult Regular)   Pulse 71   Temp 98.7  F (37.1  C) (Oral)   Resp 15   Ht 1.6 m (5' 3\")   Wt 66.7 kg (147 lb)   SpO2 99%   BMI 26.04 kg/m     Estimated body mass index is " "26.04 kg/m  as calculated from the following:    Height as of this encounter: 1.6 m (5' 3\").    Weight as of this encounter: 66.7 kg (147 lb).    Physical Exam  GENERAL: alert and no distress  EYES: Eyes grossly normal to inspection, PERRL and conjunctivae and sclerae normal  HENT: ear canals and TM's normal, nose and mouth without ulcers or lesions  NECK: no adenopathy, no asymmetry, masses, or scars  RESP: lungs clear to auscultation - no rales, rhonchi or wheezes  CV: regular rate and rhythm with occasional PVC, normal S1 S2, no S3 or S4, no murmur, click or rub, no peripheral edema  ABDOMEN: soft, nontender, no hepatosplenomegaly, no masses and bowel sounds normal  MS: no gross musculoskeletal defects noted, no edema  SKIN: no suspicious lesions or rashes  NEURO: Normal strength and tone, mentation intact and speech normal  PSYCH: mentation appears normal, affect normal/bright         3/24/2025   Mini Cog   Clock Draw Score 2 Normal   3 Item Recall 3 objects recalled   Mini Cog Total Score 5              Signed Electronically by: Paula Bhakta PA-C    "

## 2025-03-24 NOTE — PATIENT INSTRUCTIONS
Patient Education   Preventive Care Advice   This is general advice given by our system to help you stay healthy. However, your care team may have specific advice just for you. Please talk to your care team about your preventive care needs.  Nutrition  Eat 5 or more servings of fruits and vegetables each day.  Try wheat bread, brown rice and whole grain pasta (instead of white bread, rice, and pasta).  Get enough calcium and vitamin D. Check the label on foods and aim for 100% of the RDA (recommended daily allowance).  Lifestyle  Exercise at least 150 minutes each week  (30 minutes a day, 5 days a week).  Do muscle strengthening activities 2 days a week. These help control your weight and prevent disease.  No smoking.  Wear sunscreen to prevent skin cancer.  Have a dental exam and cleaning every 6 months.  Yearly exams  See your health care team every year to talk about:  Any changes in your health.  Any medicines your care team has prescribed.  Preventive care, family planning, and ways to prevent chronic diseases.  Shots (vaccines)   HPV shots (up to age 26), if you've never had them before.  Hepatitis B shots (up to age 59), if you've never had them before.  COVID-19 shot: Get this shot when it's due.  Flu shot: Get a flu shot every year.  Tetanus shot: Get a tetanus shot every 10 years.  Pneumococcal, hepatitis A, and RSV shots: Ask your care team if you need these based on your risk.  Shingles shot (for age 50 and up)  General health tests  Diabetes screening:  Starting at age 35, Get screened for diabetes at least every 3 years.  If you are younger than age 35, ask your care team if you should be screened for diabetes.  Cholesterol test: At age 39, start having a cholesterol test every 5 years, or more often if advised.  Bone density scan (DEXA): At age 50, ask your care team if you should have this scan for osteoporosis (brittle bones).  Hepatitis C: Get tested at least once in your life.  STIs (sexually  transmitted infections)  Before age 24: Ask your care team if you should be screened for STIs.  After age 24: Get screened for STIs if you're at risk. You are at risk for STIs (including HIV) if:  You are sexually active with more than one person.  You don't use condoms every time.  You or a partner was diagnosed with a sexually transmitted infection.  If you are at risk for HIV, ask about PrEP medicine to prevent HIV.  Get tested for HIV at least once in your life, whether you are at risk for HIV or not.  Cancer screening tests  Cervical cancer screening: If you have a cervix, begin getting regular cervical cancer screening tests starting at age 21.  Breast cancer scan (mammogram): If you've ever had breasts, begin having regular mammograms starting at age 40. This is a scan to check for breast cancer.  Colon cancer screening: It is important to start screening for colon cancer at age 45.  Have a colonoscopy test every 10 years (or more often if you're at risk) Or, ask your provider about stool tests like a FIT test every year or Cologuard test every 3 years.  To learn more about your testing options, visit:   .  For help making a decision, visit:   https://bit.ly/wl24978.  Prostate cancer screening test: If you have a prostate, ask your care team if a prostate cancer screening test (PSA) at age 55 is right for you.  Lung cancer screening: If you are a current or former smoker ages 50 to 80, ask your care team if ongoing lung cancer screenings are right for you.  For informational purposes only. Not to replace the advice of your health care provider. Copyright   2023 [facility] [employer]. All rights reserved. Clinically reviewed by the [facility]itions [facility]. [employer] 814967 - REV 01/24.  Hearing Loss: Care Instructions  Overview     Hearing loss is a sudden or slow decrease in how well you hear. It can range from slight to profound. Permanent hearing loss can occur with aging. It also can  happen when you are exposed long-term to loud noise. Examples include listening to loud music, riding motorcycles, or being around other loud machines.  Hearing loss can affect your work and home life. It can make you feel lonely or depressed. You may feel that you have lost your independence. But hearing aids and other devices can help you hear better and feel connected to others.  Follow-up care is a key part of your treatment and safety. Be sure to make and go to all appointments, and call your doctor if you are having problems. It's also a good idea to know your test results and keep a list of the medicines you take.  How can you care for yourself at home?  Avoid loud noises whenever possible. This helps keep your hearing from getting worse.  Always wear hearing protection around loud noises.  Wear a hearing aid as directed.  A professional can help you pick a hearing aid that will work best for you.  You can also get hearing aids over the counter for mild to moderate hearing loss.  Have hearing tests as your doctor suggests. They can show whether your hearing has changed. Your hearing aid may need to be adjusted.  Use other devices as needed. These may include:  Telephone amplifiers and hearing aids that can connect to a television, stereo, radio, or microphone.  Devices that use lights or vibrations. These alert you to the doorbell, a ringing telephone, or a baby monitor.  Television closed-captioning. This shows the words at the bottom of the screen. Most new TVs can do this.  TTY (text telephone). This lets you type messages back and forth on the telephone instead of talking or listening. These devices are also called TDD. When messages are typed on the keyboard, they are sent over the phone line to a receiving TTY. The message is shown on a monitor.  Use text messaging, social media, and email if it is hard for you to communicate by telephone.  Try to learn a listening technique called speechreading. It is  "not lipreading. You pay attention to people's gestures, expressions, posture, and tone of voice. These clues can help you understand what a person is saying. Face the person you are talking to, and have them face you. Make sure the lighting is good. You need to see the other person's face clearly.  Think about counseling if you need help to adjust to your hearing loss.  When should you call for help?  Watch closely for changes in your health, and be sure to contact your doctor if:    You think your hearing is getting worse.     You have new symptoms, such as dizziness or nausea.   Where can you learn more?  Go to https://www.[employer].net/patiented  Enter R798 in the search box to learn more about \"Hearing Loss: Care Instructions.\"  Current as of: October 27, 2024  Content Version: 14.4    7037-2781 [employer].   Care instructions adapted under license by your healthcare professional. If you have questions about a medical condition or this instruction, always ask your healthcare professional. [employer] disclaims any warranty or liability for your use of this information.    Learning About Sleeping Well  What does sleeping well mean?     Sleeping well means getting enough sleep to feel good and stay healthy. How much sleep is enough varies among people.  The number of hours you sleep and how you feel when you wake up are both important. If you do not feel refreshed, you probably need more sleep. Another sign of not getting enough sleep is feeling tired during the day.  Experts recommend that adults get at least 7 or more hours of sleep per day. Children and older adults need more sleep.  Why is getting enough sleep important?  Getting enough quality sleep is a basic part of good health. When your sleep suffers, your physical health, mood, and your thoughts can suffer too. You may find yourself feeling more grumpy or stressed. Not getting enough sleep also can lead to serious problems, " "including injury, accidents, anxiety, and depression.  What might cause poor sleeping?  Many things can cause sleep problems, including:  Changes to your sleep schedule.  Stress. Stress can be caused by fear about a single event, such as giving a speech. Or you may have ongoing stress, such as worry about work or school.  Depression, anxiety, and other mental or emotional conditions.  Changes in your sleep habits or surroundings. This includes changes that happen where you sleep, such as noise, light, or sleeping in a different bed. It also includes changes in your sleep pattern, such as having jet lag or working a late shift.  Health problems, such as pain, breathing problems, and restless legs syndrome.  Lack of regular exercise.  Using alcohol, nicotine, or caffeine before bed.  How can you help yourself?  Here are some tips that may help you sleep more soundly and wake up feeling more refreshed.  Your sleeping area   Use your bedroom only for sleeping and sex. A bit of light reading may help you fall asleep. But if it doesn't, do your reading elsewhere in the house. Try not to use your TV, computer, smartphone, or tablet while you are in bed.  Be sure your bed is big enough to stretch out comfortably, especially if you have a sleep partner.  Keep your bedroom quiet, dark, and cool. Use curtains, blinds, or a sleep mask to block out light. To block out noise, use earplugs, soothing music, or a \"white noise\" machine.  Your evening and bedtime routine   Create a relaxing bedtime routine. You might want to take a warm shower or bath, or listen to soothing music.  Go to bed at the same time every night. And get up at the same time every morning, even if you feel tired.  What to avoid   Limit caffeine (coffee, tea, caffeinated sodas) during the day, and don't have any for at least 6 hours before bedtime.  Avoid drinking alcohol before bedtime. Alcohol can cause you to wake up more often during the night.  Try not to " "smoke or use tobacco, especially in the evening. Nicotine can keep you awake.  Limit naps during the day, especially close to bedtime.  Avoid lying in bed awake for too long. If you can't fall asleep or if you wake up in the middle of the night and can't get back to sleep within about 20 minutes, get out of bed and go to another room until you feel sleepy.  Avoid taking medicine right before bed that may keep you awake or make you feel hyper or energized. Your doctor can tell you if your medicine may do this and if you can take it earlier in the day.  If you can't sleep   Imagine yourself in a peaceful, pleasant scene. Focus on the details and feelings of being in a place that is relaxing.  Get up and do a quiet or boring activity until you feel sleepy.  Avoid drinking any liquids before going to bed to help prevent waking up often to use the bathroom.  Where can you learn more?  Go to https://www.[employer].net/patiented  Enter J942 in the search box to learn more about \"Learning About Sleeping Well.\"  Current as of: July 31, 2024  Content Version: 14.4    2802-6842 [employer].   Care instructions adapted under license by your healthcare professional. If you have questions about a medical condition or this instruction, always ask your healthcare professional. [employer] disclaims any warranty or liability for your use of this information.       "

## 2025-03-24 NOTE — PROGRESS NOTES
Preventive Care Visit  [facility]  Mar 24, 2025  {Provider  Link to SmartSet :927157}    {PROVIDER CHARTING PREFERENCE:747967}    Aileen Marvin is a 74 year old, presenting for the following:  Medicare Visit    {(!) Visit Details have not yet been documented.  Please enter Visit Details and then use this list to pull in documentation. (Optional):731671}  {ROOMER if patient is in their first year of Medicare a vision screen is required click here to document the Vison screen and then refresh the note to pull in results  :589453}      HPI       {SUPERLIST (Optional):034970}  {additonal problems for provider to add (Optional):341055}      Advance Care Planning  Patient does not have a Health Care Directive: Discussed advance care planning with patient; however, patient declined at this time.      3/22/2024   General Health   How would you rate your overall physical health? Good   Feel stress (tense, anxious, or unable to sleep) Not at all         3/22/2024   Nutrition   Diet: Regular (no restrictions)         3/22/2024   Exercise   Days per week of moderate/strenous exercise 3 days         3/22/2024   Social Factors   Frequency of gathering with friends or relatives More than three times a week   Worry food won't last until get money to buy more No   Food not last or not have enough money for food? No   Do you have housing? (Housing is defined as stable permanent housing and does not include staying ouside in a car, in a tent, in an abandoned building, in an overnight shelter, or couch-surfing.) Yes   Are you worried about losing your housing? No   Lack of transportation? No   Unable to get utilities (heat,electricity)? No         3/22/2024   Activities of Daily Living- Home Safety   Needs help with the following daily activites None of the above   Safety concerns in the home None of the above         3/22/2024   Dental   Dentist two times every  year? Yes         3/22/2024   Hearing Screening   Hearing concerns? None of the above           3/22/2024   Urinary Incontinence Screening   Bothered by leaking urine in past 6 months No           3/22/2024   TB Screening   Were you born outside of the [place]? No         {Rooming Staff Patient needs a PHQ as part of the AWV.  Use this link to complete and then refresh the note to pull results Link to PHQ2 Assessment :736358}  {USE TO PULL IN PHQ RESULTS FOR TODAY:726818}      3/22/2024   Substance Use   Alcohol more than 3/day or more than 7/wk No   Do you have a current opioid prescription? No   How severe/bad is pain from 1 to 10? 1/10   Do you use any other substances recreationally? (!) BATH SALTS     Social History     Tobacco Use    Smoking status: Former     Passive exposure: Never    Smokeless tobacco: Never    Tobacco comments:     quit in 1985   Vaping Use    Vaping status: Never Used   Substance Use Topics    Alcohol use: No     Alcohol/week: 0.0 standard drinks of alcohol    Drug use: No     {Provider  If there are gaps in the social history shown above, please follow the link to update and then refresh the note Link to Social and Substance History :596420}      3/22/2024   LAST FHS-7 RESULTS   1st degree relative breast or ovarian cancer No   Any relative bilateral breast cancer No   Any male have breast cancer No   Any ONE woman have BOTH breast AND ovarian cancer No   Any woman with breast cancer before 50yrs No   2 or more relatives with breast AND/OR ovarian cancer No   2 or more relatives with breast AND/OR bowel cancer No     {If any of the questions to the FHS7 are answered yes, consider referral for genetic counseling.    Additional indications for genetic referral include personal history of breast or ovarian cancer, genetic mutation in 1st degree relative which increases risk of breast cancer including BRCA1, BRCA2, SUMANTH, PALB 2, TP53, CHEK2, PTEN, CDH1, STK11 (per ACS) and/or 1st degree relative  with history of pancreatic or high-risk prostate cancer (per NCCN):694015}   {Mammogram Decision Support (Optional):710489}    ASCVD Risk   The ASCVD Risk score (Farzaneh ESPARZA, et al., 2019) failed to calculate for the following reasons:    The systolic blood pressure is missing    {Link to Fracture Risk Assessment Tool (Optional):441151}    {Provider  REQUIRED FOR AWV Use the storyboard to review patient history, after sections have been marked as reviewed, refresh note to capture documentation:863423}  {Provider   REQUIRED AWV use this link to review and update sexual activity history  after section has been marked as reviewed, refresh note to capture documentation:690095}  Reviewed and updated as needed this visit by Provider                    {HISTORY OPTIONS (Optional):179949}  Current providers sharing in care for this patient include:  Patient Care Team:  Clinic - [facility] as PCP - Paula Smith PA-C as Assigned PCP    The following health maintenance items are reviewed in Epic and correct as of today:  Health Maintenance   Topic Date Due    DEXA  Never done    ZOSTER IMMUNIZATION (1 of 2) Never done    RSV VACCINE (1 - Risk 60-74 years 1-dose series) Never done    COLORECTAL CANCER SCREENING  03/23/2024    INFLUENZA VACCINE (1) 09/01/2024    COVID-19 Vaccine (1 - 2024-25 season) Never done    PHQ-2 (once per calendar year)  01/01/2025    BMP  03/22/2025    LIPID  03/22/2025    ANNUAL REVIEW OF HM ORDERS  03/22/2025    FALL RISK ASSESSMENT  03/22/2025    MEDICARE ANNUAL WELLNESS VISIT  03/22/2025    MAMMO SCREENING  03/22/2026    DIABETES SCREENING  03/22/2027    ADVANCE CARE PLANNING  03/22/2029    DTAP/TDAP/TD IMMUNIZATION (4 - Td or Tdap) 01/27/2035    HEPATITIS C SCREENING  Completed    Pneumococcal Vaccine: 50+ Years  Completed    HPV IMMUNIZATION  Aged Out    MENINGITIS IMMUNIZATION  Aged Out    LUNG CANCER SCREENING  Discontinued       {ROS Picklists  "(Optional):003595}     Objective    Exam  There were no vitals taken for this visit.   Estimated body mass index is 24.8 kg/m  as calculated from the following:    Height as of 3/22/24: 1.6 m (5' 3\").    Weight as of 3/22/24: 63.5 kg (140 lb).    Physical Exam  {Exam Choices (Optional):246277}  {Provider  The Mini-Cog is incomplete, use link to complete and refresh note Link to Mini-Cog :368571}      3/22/2024   Mini Cog   Clock Draw Score 2 Normal   3 Item Recall 3 objects recalled   Mini Cog Total Score 5     {A Mini-Cog total score of 0-2 suggests the possibility of dementia, score of 3-5 suggests no dementia:280046}         Signed Electronically by: Paula Bhakta PA-C  {Email feedback regarding this note to [email or web address]   :908469}  "

## 2025-03-25 LAB
ANION GAP SERPL CALCULATED.3IONS-SCNC: 9 MMOL/L (ref 7–15)
BUN SERPL-MCNC: 11.7 MG/DL (ref 8–23)
CALCIUM SERPL-MCNC: 9.8 MG/DL (ref 8.8–10.4)
CHLORIDE SERPL-SCNC: 102 MMOL/L (ref 98–107)
CHOLEST SERPL-MCNC: 223 MG/DL
CREAT SERPL-MCNC: 0.43 MG/DL (ref 0.51–0.95)
CREAT UR-MCNC: 6.6 MG/DL
EGFRCR SERPLBLD CKD-EPI 2021: >90 ML/MIN/1.73M2
FASTING STATUS PATIENT QL REPORTED: YES
FASTING STATUS PATIENT QL REPORTED: YES
GLUCOSE SERPL-MCNC: 92 MG/DL (ref 70–99)
HCO3 SERPL-SCNC: 29 MMOL/L (ref 22–29)
HDLC SERPL-MCNC: 53 MG/DL
LDLC SERPL CALC-MCNC: 156 MG/DL
MICROALBUMIN UR-MCNC: <12 MG/L
MICROALBUMIN/CREAT UR: NORMAL MG/G{CREAT}
NONHDLC SERPL-MCNC: 170 MG/DL
POTASSIUM SERPL-SCNC: 5.3 MMOL/L (ref 3.4–5.3)
SODIUM SERPL-SCNC: 140 MMOL/L (ref 135–145)
TRIGL SERPL-MCNC: 68 MG/DL
TSH SERPL DL<=0.005 MIU/L-ACNC: 1.19 UIU/ML (ref 0.3–4.2)